# Patient Record
Sex: MALE | Race: WHITE
[De-identification: names, ages, dates, MRNs, and addresses within clinical notes are randomized per-mention and may not be internally consistent; named-entity substitution may affect disease eponyms.]

---

## 2017-11-16 ENCOUNTER — APPOINTMENT (OUTPATIENT)
Dept: DERMATOLOGY | Facility: CLINIC | Age: 82
End: 2017-11-16
Payer: MEDICARE

## 2017-11-16 VITALS — HEIGHT: 69 IN | BODY MASS INDEX: 27.25 KG/M2 | WEIGHT: 184 LBS

## 2017-11-16 DIAGNOSIS — L30.8 OTHER SPECIFIED DERMATITIS: ICD-10-CM

## 2017-11-16 PROCEDURE — 99213 OFFICE O/P EST LOW 20 MIN: CPT

## 2018-06-14 ENCOUNTER — OUTPATIENT (OUTPATIENT)
Dept: OUTPATIENT SERVICES | Facility: HOSPITAL | Age: 83
LOS: 1 days | End: 2018-06-14
Payer: MEDICARE

## 2018-06-14 VITALS
HEIGHT: 67 IN | TEMPERATURE: 98 F | SYSTOLIC BLOOD PRESSURE: 144 MMHG | RESPIRATION RATE: 14 BRPM | DIASTOLIC BLOOD PRESSURE: 53 MMHG | HEART RATE: 56 BPM | WEIGHT: 184.31 LBS

## 2018-06-14 DIAGNOSIS — R35.1 NOCTURIA: ICD-10-CM

## 2018-06-14 DIAGNOSIS — E78.5 HYPERLIPIDEMIA, UNSPECIFIED: ICD-10-CM

## 2018-06-14 DIAGNOSIS — N40.1 BENIGN PROSTATIC HYPERPLASIA WITH LOWER URINARY TRACT SYMPTOMS: ICD-10-CM

## 2018-06-14 DIAGNOSIS — R41.3 OTHER AMNESIA: ICD-10-CM

## 2018-06-14 DIAGNOSIS — I10 ESSENTIAL (PRIMARY) HYPERTENSION: ICD-10-CM

## 2018-06-14 DIAGNOSIS — Z96.651 PRESENCE OF RIGHT ARTIFICIAL KNEE JOINT: Chronic | ICD-10-CM

## 2018-06-14 DIAGNOSIS — F41.9 ANXIETY DISORDER, UNSPECIFIED: ICD-10-CM

## 2018-06-14 DIAGNOSIS — Z90.89 ACQUIRED ABSENCE OF OTHER ORGANS: Chronic | ICD-10-CM

## 2018-06-14 DIAGNOSIS — Z01.818 ENCOUNTER FOR OTHER PREPROCEDURAL EXAMINATION: ICD-10-CM

## 2018-06-14 LAB
ANION GAP SERPL CALC-SCNC: 7 MMOL/L — SIGNIFICANT CHANGE UP (ref 5–17)
APPEARANCE UR: CLEAR — SIGNIFICANT CHANGE UP
BILIRUB UR-MCNC: NEGATIVE — SIGNIFICANT CHANGE UP
BUN SERPL-MCNC: 21 MG/DL — SIGNIFICANT CHANGE UP (ref 7–23)
CALCIUM SERPL-MCNC: 9.2 MG/DL — SIGNIFICANT CHANGE UP (ref 8.5–10.1)
CHLORIDE SERPL-SCNC: 109 MMOL/L — HIGH (ref 96–108)
CO2 SERPL-SCNC: 28 MMOL/L — SIGNIFICANT CHANGE UP (ref 22–31)
COLOR SPEC: SIGNIFICANT CHANGE UP
CREAT SERPL-MCNC: 1.1 MG/DL — SIGNIFICANT CHANGE UP (ref 0.5–1.3)
DIFF PNL FLD: NEGATIVE — SIGNIFICANT CHANGE UP
GLUCOSE SERPL-MCNC: 96 MG/DL — SIGNIFICANT CHANGE UP (ref 70–99)
GLUCOSE UR QL: NEGATIVE — SIGNIFICANT CHANGE UP
HCT VFR BLD CALC: 33.4 % — LOW (ref 39–50)
HGB BLD-MCNC: 11 G/DL — LOW (ref 13–17)
KETONES UR-MCNC: NEGATIVE — SIGNIFICANT CHANGE UP
LEUKOCYTE ESTERASE UR-ACNC: NEGATIVE — SIGNIFICANT CHANGE UP
MCHC RBC-ENTMCNC: 30.3 PG — SIGNIFICANT CHANGE UP (ref 27–34)
MCHC RBC-ENTMCNC: 32.9 GM/DL — SIGNIFICANT CHANGE UP (ref 32–36)
MCV RBC AUTO: 92 FL — SIGNIFICANT CHANGE UP (ref 80–100)
NITRITE UR-MCNC: NEGATIVE — SIGNIFICANT CHANGE UP
NRBC # BLD: 0 /100 WBCS — SIGNIFICANT CHANGE UP (ref 0–0)
PH UR: 5 — SIGNIFICANT CHANGE UP (ref 5–8)
PLATELET # BLD AUTO: 168 K/UL — SIGNIFICANT CHANGE UP (ref 150–400)
POTASSIUM SERPL-MCNC: 4.4 MMOL/L — SIGNIFICANT CHANGE UP (ref 3.5–5.3)
POTASSIUM SERPL-SCNC: 4.4 MMOL/L — SIGNIFICANT CHANGE UP (ref 3.5–5.3)
PROT UR-MCNC: NEGATIVE — SIGNIFICANT CHANGE UP
RBC # BLD: 3.63 M/UL — LOW (ref 4.2–5.8)
RBC # FLD: 15.1 % — HIGH (ref 10.3–14.5)
SODIUM SERPL-SCNC: 144 MMOL/L — SIGNIFICANT CHANGE UP (ref 135–145)
SP GR SPEC: 1.01 — SIGNIFICANT CHANGE UP (ref 1.01–1.02)
UROBILINOGEN FLD QL: NEGATIVE — SIGNIFICANT CHANGE UP
WBC # BLD: 4.99 K/UL — SIGNIFICANT CHANGE UP (ref 3.8–10.5)
WBC # FLD AUTO: 4.99 K/UL — SIGNIFICANT CHANGE UP (ref 3.8–10.5)

## 2018-06-14 PROCEDURE — 93010 ELECTROCARDIOGRAM REPORT: CPT | Mod: NC

## 2018-06-14 PROCEDURE — 85027 COMPLETE CBC AUTOMATED: CPT

## 2018-06-14 PROCEDURE — 93005 ELECTROCARDIOGRAM TRACING: CPT

## 2018-06-14 PROCEDURE — 81003 URINALYSIS AUTO W/O SCOPE: CPT

## 2018-06-14 PROCEDURE — 87086 URINE CULTURE/COLONY COUNT: CPT

## 2018-06-14 PROCEDURE — 80048 BASIC METABOLIC PNL TOTAL CA: CPT

## 2018-06-14 PROCEDURE — G0463: CPT

## 2018-06-14 NOTE — H&P PST ADULT - NEGATIVE GENERAL GENITOURINARY SYMPTOMS
no dysuria/no flank pain R/no flank pain L/no hematuria/no renal colic/no incontinence/no bladder infections

## 2018-06-14 NOTE — H&P PST ADULT - HISTORY OF PRESENT ILLNESS
87yo male with medical h/o HTN, HDL, Anxiety, Memory problems, and Memory problems. Pt reports urinary symptoms of urgency and frequency x 2-3 months, with occasional pain/discomfort. Pt presents today for presurgical testing for Greenlight Laser Prostate scheduled 6/26/2016.

## 2018-06-14 NOTE — H&P PST ADULT - NEGATIVE NEUROLOGICAL SYMPTOMS
no tremors/no vertigo/no loss of sensation/no syncope/no loss of consciousness/no paresthesias/no headache/no weakness/no generalized seizures/no focal seizures/no transient paralysis

## 2018-06-14 NOTE — H&P PST ADULT - NEGATIVE CARDIOVASCULAR SYMPTOMS
no peripheral edema/no claudication/no palpitations/no paroxysmal nocturnal dyspnea/no orthopnea/no dyspnea on exertion/no chest pain

## 2018-06-14 NOTE — H&P PST ADULT - PROBLEM SELECTOR PLAN 1
Greenlight Laser Prostate scheduled 6/26/2016.  Pre-op instructions given. Pt verbalized understanding  Chlorhexidine wash instructions given  Pending: CMC, BMP, UA, Urine C&S & Medical clearance (Dr. Vasquez PCP) Greenlight Laser Prostate scheduled 6/26/2016.  Pre-op instructions given. Pt verbalized understanding  Chlorhexidine wash instructions given  Pending:   CMC, BMP, UA, Urine C&S   Medical clearance (Dr. Vasquez PCP)

## 2018-06-14 NOTE — H&P PST ADULT - NEGATIVE MUSCULOSKELETAL SYMPTOMS
no arthralgia/no joint swelling/no myalgia/no stiffness/no arm pain L/no arm pain R/no muscle weakness/no muscle cramps

## 2018-06-14 NOTE — H&P PST ADULT - FALLEN IN THE PAST
tripped and fell few days ago at home, with abrasion to right elbow - didn't require hospitalization/yes

## 2018-06-14 NOTE — H&P PST ADULT - NSANTHOSAYNRD_GEN_A_CORE
No. BERNIE screening performed.  STOP BANG Legend: 0-2 = LOW Risk; 3-4 = INTERMEDIATE Risk; 5-8 = HIGH Risk

## 2018-06-15 LAB
CULTURE RESULTS: NO GROWTH — SIGNIFICANT CHANGE UP
SPECIMEN SOURCE: SIGNIFICANT CHANGE UP

## 2018-06-25 ENCOUNTER — TRANSCRIPTION ENCOUNTER (OUTPATIENT)
Age: 83
End: 2018-06-25

## 2018-06-26 ENCOUNTER — OUTPATIENT (OUTPATIENT)
Dept: OUTPATIENT SERVICES | Facility: HOSPITAL | Age: 83
LOS: 1 days | End: 2018-06-26
Payer: MEDICARE

## 2018-06-26 VITALS
HEART RATE: 44 BPM | TEMPERATURE: 98 F | OXYGEN SATURATION: 97 % | SYSTOLIC BLOOD PRESSURE: 141 MMHG | DIASTOLIC BLOOD PRESSURE: 66 MMHG | HEIGHT: 68 IN | WEIGHT: 184.31 LBS | RESPIRATION RATE: 15 BRPM

## 2018-06-26 VITALS
RESPIRATION RATE: 10 BRPM | SYSTOLIC BLOOD PRESSURE: 155 MMHG | OXYGEN SATURATION: 99 % | HEART RATE: 66 BPM | DIASTOLIC BLOOD PRESSURE: 70 MMHG

## 2018-06-26 DIAGNOSIS — N40.1 BENIGN PROSTATIC HYPERPLASIA WITH LOWER URINARY TRACT SYMPTOMS: ICD-10-CM

## 2018-06-26 DIAGNOSIS — Z01.818 ENCOUNTER FOR OTHER PREPROCEDURAL EXAMINATION: ICD-10-CM

## 2018-06-26 DIAGNOSIS — Z96.651 PRESENCE OF RIGHT ARTIFICIAL KNEE JOINT: Chronic | ICD-10-CM

## 2018-06-26 DIAGNOSIS — R35.1 NOCTURIA: ICD-10-CM

## 2018-06-26 DIAGNOSIS — Z90.89 ACQUIRED ABSENCE OF OTHER ORGANS: Chronic | ICD-10-CM

## 2018-06-26 PROCEDURE — C1889: CPT

## 2018-06-26 PROCEDURE — 52648 LASER SURGERY OF PROSTATE: CPT

## 2018-06-26 RX ORDER — ONDANSETRON 8 MG/1
4 TABLET, FILM COATED ORAL ONCE
Qty: 0 | Refills: 0 | Status: DISCONTINUED | OUTPATIENT
Start: 2018-06-26 | End: 2018-06-26

## 2018-06-26 RX ORDER — CEFTRIAXONE 500 MG/1
1 INJECTION, POWDER, FOR SOLUTION INTRAMUSCULAR; INTRAVENOUS ONCE
Qty: 0 | Refills: 0 | Status: COMPLETED | OUTPATIENT
Start: 2018-06-26 | End: 2018-06-26

## 2018-06-26 RX ORDER — PHENAZOPYRIDINE HCL 100 MG
1 TABLET ORAL
Qty: 21 | Refills: 1
Start: 2018-06-26 | End: 2018-07-09

## 2018-06-26 RX ORDER — SODIUM CHLORIDE 9 MG/ML
1000 INJECTION, SOLUTION INTRAVENOUS
Qty: 0 | Refills: 0 | Status: DISCONTINUED | OUTPATIENT
Start: 2018-06-26 | End: 2018-06-26

## 2018-06-26 RX ORDER — ACETAMINOPHEN 500 MG
1000 TABLET ORAL ONCE
Qty: 0 | Refills: 0 | Status: COMPLETED | OUTPATIENT
Start: 2018-06-26 | End: 2018-06-26

## 2018-06-26 RX ORDER — HYDROMORPHONE HYDROCHLORIDE 2 MG/ML
0.3 INJECTION INTRAMUSCULAR; INTRAVENOUS; SUBCUTANEOUS ONCE
Qty: 0 | Refills: 0 | Status: DISCONTINUED | OUTPATIENT
Start: 2018-06-26 | End: 2018-06-26

## 2018-06-26 RX ORDER — CEFUROXIME AXETIL 250 MG
1 TABLET ORAL
Qty: 10 | Refills: 0
Start: 2018-06-26 | End: 2018-06-30

## 2018-06-26 RX ADMIN — SODIUM CHLORIDE 75 MILLILITER(S): 9 INJECTION, SOLUTION INTRAVENOUS at 11:05

## 2018-06-26 RX ADMIN — Medication 400 MILLIGRAM(S): at 12:59

## 2018-06-26 RX ADMIN — SODIUM CHLORIDE 75 MILLILITER(S): 9 INJECTION, SOLUTION INTRAVENOUS at 12:59

## 2018-06-26 NOTE — ASU DISCHARGE PLAN (ADULT/PEDIATRIC). - MEDICATION SUMMARY - MEDICATIONS TO TAKE
I will START or STAY ON the medications listed below when I get home from the hospital:    metoprolol  -- 25 milligram(s) by mouth once a day  -- Indication: For as prior to admission    tamsulosin 0.4 mg oral capsule  -- 1 cap(s) by mouth once a day  -- Indication: For as prior to admission    sertraline 25 mg oral tablet  -- 1 tab(s) by mouth once a day  -- Indication: For as prior to admission    cefuroxime 500 mg oral tablet  -- 1 tab(s) by mouth every 12 hours   -- Finish all this medication unless otherwise directed by prescriber.  Medication should be taken with plenty of water.  Take with food or milk.    -- Indication: For antibiotic    donepezil 10 mg oral tablet  -- 1 tab(s) by mouth once a day (at bedtime)  -- Indication: For as prior to admission    Ginkgo Biloba oral tablet  -- 1  orally. last dose 6/18/2018  -- Indication: For as prior to admission    memantine 5 mg oral tablet  -- orally once a day  -- Indication: For as prior to admission    Pyridium 200 mg oral tablet  -- 1 tab(s) by mouth 3 times a day (after meals) as needed for urinary discomfort  -- May discolor urine or feces.  Medication should be taken with plenty of water.  Take with food or milk.    -- Indication: For as needed for urinary discomfort    Lutein  -- 1  orally. last dose 6/18/2018  -- Indication: For as prior to admission    multivitamin  -- 1  orally. last dose 6/18/2018  -- Indication: For as prior to admission    Vitamin D3  -- 1  orally  -- Indication: For as prior to admission

## 2018-06-26 NOTE — ASU PATIENT PROFILE, ADULT - FALLEN IN THE PAST
yes/tripped and fell few days ago at home, with abrasion to right elbow - didn't require hospitalization

## 2018-06-26 NOTE — BRIEF OPERATIVE NOTE - PROCEDURE
<<-----Click on this checkbox to enter Procedure Cystoscopy with photoselective vaporization of prostate (PVP) with 532 nm laser  06/26/2018    Active  MARIZ

## 2019-09-20 ENCOUNTER — RX RENEWAL (OUTPATIENT)
Age: 84
End: 2019-09-20

## 2020-08-04 PROBLEM — I10 ESSENTIAL (PRIMARY) HYPERTENSION: Chronic | Status: ACTIVE | Noted: 2018-06-14

## 2020-08-04 PROBLEM — M19.90 UNSPECIFIED OSTEOARTHRITIS, UNSPECIFIED SITE: Chronic | Status: ACTIVE | Noted: 2018-06-14

## 2020-08-04 PROBLEM — E78.5 HYPERLIPIDEMIA, UNSPECIFIED: Chronic | Status: ACTIVE | Noted: 2018-06-14

## 2020-08-04 PROBLEM — F41.9 ANXIETY DISORDER, UNSPECIFIED: Chronic | Status: ACTIVE | Noted: 2018-06-14

## 2020-08-21 ENCOUNTER — APPOINTMENT (OUTPATIENT)
Dept: INTERNAL MEDICINE | Facility: CLINIC | Age: 85
End: 2020-08-21
Payer: MEDICARE

## 2020-08-21 VITALS
SYSTOLIC BLOOD PRESSURE: 130 MMHG | TEMPERATURE: 97.2 F | RESPIRATION RATE: 18 BRPM | OXYGEN SATURATION: 97 % | HEIGHT: 70 IN | DIASTOLIC BLOOD PRESSURE: 62 MMHG | WEIGHT: 172 LBS | HEART RATE: 59 BPM | BODY MASS INDEX: 24.62 KG/M2

## 2020-08-21 PROCEDURE — 99204 OFFICE O/P NEW MOD 45 MIN: CPT

## 2020-08-21 RX ORDER — APIXABAN 5 MG/1
TABLET, FILM COATED ORAL
Refills: 0 | Status: DISCONTINUED | COMMUNITY
End: 2020-08-21

## 2020-08-21 NOTE — PHYSICAL EXAM
[Well Developed] : well developed [No Acute Distress] : no acute distress [Well Nourished] : well nourished [Normal Sclera/Conjunctiva] : normal sclera/conjunctiva [Normal Oropharynx] : the oropharynx was normal [Normal Outer Ear/Nose] : the outer ears and nose were normal in appearance [No JVD] : no jugular venous distention [No Lymphadenopathy] : no lymphadenopathy [Supple] : supple [No Respiratory Distress] : no respiratory distress  [No Accessory Muscle Use] : no accessory muscle use [Clear to Auscultation] : lungs were clear to auscultation bilaterally [Normal Rate] : normal rate  [Normal S1, S2] : normal S1 and S2 [Soft] : abdomen soft [No Extremity Clubbing/Cyanosis] : no extremity clubbing/cyanosis [Non Tender] : non-tender [Non-distended] : non-distended [No HSM] : no HSM [Normal Bowel Sounds] : normal bowel sounds [Normal Anterior Cervical Nodes] : no anterior cervical lymphadenopathy [No Rash] : no rash [Normal Gait] : normal gait [de-identified] : patch over R eye area [de-identified] : 1 plus LE edema

## 2020-08-21 NOTE — HISTORY OF PRESENT ILLNESS
[de-identified] : This is the first visit here for this 89-year-old male who is accompanied by his wife today.  He has a history of dementia, hypertension, depression, and BPH.  He is also followed by Dr. Sun, cardiology, in [place].\par \par He and his wife recently moved to [facility] nearby and he is changing to us for his primary medical doctor.  We will have the important part of the medical records from his previous PCP forwarded here for our records.\par \par He and his wife are refusing to have the pneumococcal 23 and Tdap vaccinations.  He will receive the flu vaccination in the fall. [FreeTextEntry1] : 1st visit.  To establish care.

## 2020-09-17 ENCOUNTER — APPOINTMENT (OUTPATIENT)
Dept: INTERNAL MEDICINE | Facility: CLINIC | Age: 85
End: 2020-09-17
Payer: MEDICARE

## 2020-09-17 VITALS
TEMPERATURE: 97.9 F | HEART RATE: 50 BPM | WEIGHT: 174.38 LBS | SYSTOLIC BLOOD PRESSURE: 150 MMHG | RESPIRATION RATE: 14 BRPM | DIASTOLIC BLOOD PRESSURE: 70 MMHG | OXYGEN SATURATION: 98 % | BODY MASS INDEX: 25.83 KG/M2 | HEIGHT: 69 IN

## 2020-09-17 VITALS — SYSTOLIC BLOOD PRESSURE: 130 MMHG | DIASTOLIC BLOOD PRESSURE: 70 MMHG

## 2020-09-17 PROCEDURE — 99214 OFFICE O/P EST MOD 30 MIN: CPT | Mod: 25

## 2020-09-17 PROCEDURE — G0442 ANNUAL ALCOHOL SCREEN 15 MIN: CPT | Mod: 59

## 2020-09-17 PROCEDURE — 90662 IIV NO PRSV INCREASED AG IM: CPT

## 2020-09-17 PROCEDURE — G0008: CPT

## 2020-09-17 RX ORDER — TAMSULOSIN HYDROCHLORIDE 0.4 MG/1
CAPSULE ORAL DAILY
Refills: 0 | Status: COMPLETED | COMMUNITY
End: 2020-08-17

## 2020-09-17 RX ORDER — FLUTICASONE PROPIONATE 0.5 MG/G
0.05 CREAM TOPICAL TWICE DAILY
Qty: 1 | Refills: 2 | Status: COMPLETED | COMMUNITY
Start: 2017-11-16 | End: 2018-09-17

## 2020-09-17 NOTE — PLAN
[FreeTextEntry1] : Cardiology\par history of hypertension - continue Furosemide 20mg p.o.q.d. as directed - continue low sodium diet - continue to follow up with cardiologist, Dr. Bean\par history of hypercholesterolemia - continue low cholesterol/low fat diet \par dizziness - likely secondary to orthostatic hypotension; possibly secondary to being on Furosemide - advised changing positions slowly - advised staying well-hydrated \par Endocrinology\par continue Vitamin D-3 1000 IU p.o.q.d. with meals as directed \par Neurology\par dementia - continue Donepezil HCl 10mg p.o.q.d. and Memantine HCl 10mg BID p.o.q.d. as directed \par Psychiatry\par depression - continue Sertraline HCl 25mg p.o. as directed \par Immunization \par flu vaccine - Fluzone Quadrivalent High-Dose 0.7mL x 1 administered intramuscularly

## 2020-09-17 NOTE — ADDENDUM
[FreeTextEntry1] : I, Leonel East, acted solely as scribe for Dr. Tashi Akbar DO on this date 09/17/2020 12:20PM .\par \par All medical record entries made by the Scribe were at my, Dr. Tashi Akbar DO direction and personally dictated by me on 09/17/2020 12:20PM. I have reviewed the chart and agree that the record accurately reflects my personal performance of the history, physical exam, assessment and plan. I have also personally directed, reviewed and agreed with the chart.\par

## 2020-09-17 NOTE — REVIEW OF SYSTEMS
[Negative] : Heme/Lymph [Vision Problems] : vision problems [Dizziness] : dizziness [FreeTextEntry3] : lost vision in right eye

## 2020-09-17 NOTE — HEALTH RISK ASSESSMENT
[Yes] : Yes [Monthly or less (1 pt)] : Monthly or less (1 point) [1 or 2 (0 pts)] : 1 or 2 (0 points) [Never (0 pts)] : Never (0 points) [No] : In the past 12 months have you used drugs other than those required for medical reasons? No [0] : 1) Little interest or pleasure doing things: Not at all (0) [1] : 2) Feeling down, depressed, or hopeless for several days (1) [] : No [Audit-CScore] : 1 [ICK6Libct] : 1
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
## 2020-09-17 NOTE — ASSESSMENT
[FreeTextEntry1] : New patient is a 89 year old male with a past medical history as above who presents to establish care.\par

## 2020-10-14 ENCOUNTER — APPOINTMENT (OUTPATIENT)
Dept: INTERNAL MEDICINE | Facility: CLINIC | Age: 85
End: 2020-10-14

## 2021-04-14 ENCOUNTER — NON-APPOINTMENT (OUTPATIENT)
Age: 86
End: 2021-04-14

## 2021-09-24 ENCOUNTER — APPOINTMENT (OUTPATIENT)
Dept: INTERNAL MEDICINE | Facility: CLINIC | Age: 86
End: 2021-09-24
Payer: MEDICARE

## 2021-09-24 VITALS — SYSTOLIC BLOOD PRESSURE: 130 MMHG | HEART RATE: 60 BPM | DIASTOLIC BLOOD PRESSURE: 80 MMHG | TEMPERATURE: 98.2 F

## 2021-09-24 DIAGNOSIS — R42 DIZZINESS AND GIDDINESS: ICD-10-CM

## 2021-09-24 DIAGNOSIS — Z86.79 PERSONAL HISTORY OF OTHER DISEASES OF THE CIRCULATORY SYSTEM: ICD-10-CM

## 2021-09-24 DIAGNOSIS — N40.0 BENIGN PROSTATIC HYPERPLASIA WITHOUT LOWER URINARY TRACT SYMPMS: ICD-10-CM

## 2021-09-24 DIAGNOSIS — Z86.39 PERSONAL HISTORY OF OTHER ENDOCRINE, NUTRITIONAL AND METABOLIC DISEASE: ICD-10-CM

## 2021-09-24 DIAGNOSIS — Z79.899 OTHER LONG TERM (CURRENT) DRUG THERAPY: ICD-10-CM

## 2021-09-24 PROCEDURE — 99214 OFFICE O/P EST MOD 30 MIN: CPT | Mod: 25

## 2021-09-24 PROCEDURE — 36415 COLL VENOUS BLD VENIPUNCTURE: CPT

## 2021-09-25 PROBLEM — R42 ORTHOSTATIC DIZZINESS: Status: ACTIVE | Noted: 2020-09-17

## 2021-09-25 PROBLEM — Z79.899 MEDICATION MANAGEMENT: Status: ACTIVE | Noted: 2021-09-24

## 2021-09-25 PROBLEM — N40.0 BPH (BENIGN PROSTATIC HYPERPLASIA): Status: ACTIVE | Noted: 2020-08-21

## 2021-09-25 NOTE — ADDENDUM
[FreeTextEntry1] : I, Leonel East, acted solely as scribe for Dr. Tashi Akbar DO on this date 09/24/2021 12:10PM .\par \par All medical record entries made by the Scribe were at my, Dr. Tashi Akbar DO direction and personally dictated by me on 09/24/2021 12:10PM. I have reviewed the chart and agree that the record accurately reflects my personal performance of the history, physical exam, assessment and plan. I have also personally directed, reviewed and agreed with the chart.\par

## 2021-09-25 NOTE — ASSESSMENT
[FreeTextEntry1] : Patient is a 89 year old male with a past medical history as above who presents for fasting blood work and general follow-up.\par

## 2021-09-25 NOTE — HEALTH RISK ASSESSMENT
[Yes] : Yes [Monthly or less (1 pt)] : Monthly or less (1 point) [1 or 2 (0 pts)] : 1 or 2 (0 points) [Never (0 pts)] : Never (0 points) [No] : In the past 12 months have you used drugs other than those required for medical reasons? No [0] : 1) Little interest or pleasure doing things: Not at all (0) [1] : 2) Feeling down, depressed, or hopeless for several days (1) [PHQ-2 Negative - No further assessment needed] : PHQ-2 Negative - No further assessment needed [] : No [Audit-CScore] : 1 [[id number]] : 1

## 2021-09-25 NOTE — HISTORY OF PRESENT ILLNESS
[Spouse] : spouse [Family Member] : family member [FreeTextEntry1] : fasting blood work and general follow-up\par  [de-identified] : Patient is a 89 year old male with a past medical history as below who presents for fasting blood work and general follow-up. Patient is taking all medications as prescribed and denies any adverse reactions or side effects. He denies lightheadedness or dizziness on Furosemide. Patient is currently taking Donepezil and Memantine for dementia. He continues to follow up with neurology. Patient states he feels well overall with no new complaints.

## 2021-09-25 NOTE — PLAN
[FreeTextEntry1] : Cardiology\par history of hypertension - continue Furosemide 20mg p.o.q.d. as directed - check CMP - continue low sodium diet; will continue to monitor BP \par history of hypercholesterolemia - continue low cholesterol/low fat diet - check FLP\par Continue to follow up with cardiologist, Dr. Bean\par Endocrinology\par continue Vitamin D-3 1000 IU p.o.q.d. with meals as directed - check Vitamin D \par Neurology\par dementia - continue Donepezil HCl 10mg p.o.q.d. as directed and Memantine HCl 10mg BID p.o.q.d. as directed - continue to follow up with neurology \par Psychiatry\par depression - continue Sertraline HCl 25mg p.o. as directed - check Serum Vitamin B12\par Urology\par BPH - check PSA\par \par check male panel, hemoglobin A1C, and Serum Vitamin B12

## 2021-09-29 LAB
25(OH)D3 SERPL-MCNC: 46.1 NG/ML
ALBUMIN SERPL ELPH-MCNC: 4.6 G/DL
ALP BLD-CCNC: 71 U/L
ALT SERPL-CCNC: 15 U/L
ANION GAP SERPL CALC-SCNC: 12 MMOL/L
AST SERPL-CCNC: 19 U/L
BASOPHILS # BLD AUTO: 0.02 K/UL
BASOPHILS NFR BLD AUTO: 0.4 %
BILIRUB SERPL-MCNC: 1 MG/DL
BUN SERPL-MCNC: 23 MG/DL
CALCIUM SERPL-MCNC: 9.6 MG/DL
CHLORIDE SERPL-SCNC: 104 MMOL/L
CHOLEST SERPL-MCNC: 173 MG/DL
CO2 SERPL-SCNC: 27 MMOL/L
CREAT SERPL-MCNC: 1.21 MG/DL
EOSINOPHIL # BLD AUTO: 0.06 K/UL
EOSINOPHIL NFR BLD AUTO: 1.1 %
ESTIMATED AVERAGE GLUCOSE: 108 MG/DL
GLUCOSE SERPL-MCNC: 94 MG/DL
HBA1C MFR BLD HPLC: 5.4 %
HCT VFR BLD CALC: 42.5 %
HDLC SERPL-MCNC: 46 MG/DL
HGB BLD-MCNC: 13.8 G/DL
IMM GRANULOCYTES NFR BLD AUTO: 0.4 %
LDLC SERPL CALC-MCNC: 94 MG/DL
LYMPHOCYTES # BLD AUTO: 1.25 K/UL
LYMPHOCYTES NFR BLD AUTO: 22.6 %
MAN DIFF?: NORMAL
MCHC RBC-ENTMCNC: 32.5 GM/DL
MCHC RBC-ENTMCNC: 33.2 PG
MCV RBC AUTO: 102.2 FL
MONOCYTES # BLD AUTO: 0.69 K/UL
MONOCYTES NFR BLD AUTO: 12.5 %
NEUTROPHILS # BLD AUTO: 3.48 K/UL
NEUTROPHILS NFR BLD AUTO: 63 %
NONHDLC SERPL-MCNC: 127 MG/DL
PLATELET # BLD AUTO: 147 K/UL
POTASSIUM SERPL-SCNC: 4.4 MMOL/L
PROT SERPL-MCNC: 7.3 G/DL
PSA SERPL-MCNC: 0.87 NG/ML
RBC # BLD: 4.16 M/UL
RBC # FLD: 13 %
SODIUM SERPL-SCNC: 143 MMOL/L
TRIGL SERPL-MCNC: 166 MG/DL
TSH SERPL-ACNC: 1.45 UIU/ML
VIT B12 SERPL-MCNC: >2000 PG/ML
WBC # FLD AUTO: 5.52 K/UL

## 2021-11-02 ENCOUNTER — EMERGENCY (EMERGENCY)
Facility: HOSPITAL | Age: 86
LOS: 0 days | Discharge: HOME | End: 2021-11-02
Attending: STUDENT IN AN ORGANIZED HEALTH CARE EDUCATION/TRAINING PROGRAM
Payer: MEDICARE

## 2021-11-02 VITALS
WEIGHT: 179.9 LBS | HEIGHT: 68 IN | HEART RATE: 80 BPM | DIASTOLIC BLOOD PRESSURE: 77 MMHG | SYSTOLIC BLOOD PRESSURE: 172 MMHG | TEMPERATURE: 98 F | RESPIRATION RATE: 18 BRPM | OXYGEN SATURATION: 100 %

## 2021-11-02 VITALS
TEMPERATURE: 98 F | HEART RATE: 77 BPM | DIASTOLIC BLOOD PRESSURE: 67 MMHG | RESPIRATION RATE: 16 BRPM | OXYGEN SATURATION: 98 % | SYSTOLIC BLOOD PRESSURE: 148 MMHG

## 2021-11-02 DIAGNOSIS — Z90.89 ACQUIRED ABSENCE OF OTHER ORGANS: Chronic | ICD-10-CM

## 2021-11-02 DIAGNOSIS — Z91.018 ALLERGY TO OTHER FOODS: ICD-10-CM
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
## 2021-11-02 DIAGNOSIS — M25.511 PAIN IN RIGHT SHOULDER: ICD-10-CM
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
## 2021-11-02 DIAGNOSIS — I10 ESSENTIAL (PRIMARY) HYPERTENSION: ICD-10-CM

## 2021-11-02 DIAGNOSIS — E78.5 HYPERLIPIDEMIA, UNSPECIFIED: ICD-10-CM

## 2021-11-02 DIAGNOSIS — Z96.651 PRESENCE OF RIGHT ARTIFICIAL KNEE JOINT: Chronic | ICD-10-CM

## 2021-11-02 PROCEDURE — 72125 CT NECK SPINE W/O DYE: CPT | Mod: MG

## 2021-11-02 PROCEDURE — G1004: CPT

## 2021-11-02 PROCEDURE — 99284 EMERGENCY DEPT VISIT MOD MDM: CPT

## 2021-11-02 PROCEDURE — 70450 CT HEAD/BRAIN W/O DYE: CPT | Mod: MG

## 2021-11-02 PROCEDURE — 73030 X-RAY EXAM OF SHOULDER: CPT | Mod: RT

## 2021-11-02 PROCEDURE — 73060 X-RAY EXAM OF HUMERUS: CPT | Mod: RT

## 2021-11-02 PROCEDURE — 70450 CT HEAD/BRAIN W/O DYE: CPT | Mod: 26,MG

## 2021-11-02 PROCEDURE — 73030 X-RAY EXAM OF SHOULDER: CPT | Mod: 26,RT

## 2021-11-02 PROCEDURE — 73060 X-RAY EXAM OF HUMERUS: CPT | Mod: 26,RT

## 2021-11-02 PROCEDURE — 72125 CT NECK SPINE W/O DYE: CPT | Mod: 26,MG

## 2021-11-02 PROCEDURE — 99284 EMERGENCY DEPT VISIT MOD MDM: CPT | Mod: 25

## 2021-11-02 RX ORDER — ACETAMINOPHEN 500 MG
650 TABLET ORAL ONCE
Refills: 0 | Status: COMPLETED | OUTPATIENT
Start: 2021-11-02 | End: 2021-11-02

## 2021-11-02 RX ADMIN — Medication 650 MILLIGRAM(S): at 11:40

## 2021-11-02 NOTE — ED PROVIDER NOTE - NSFOLLOWUPINSTRUCTIONS_ED_ALL_ED_FT
Fall Prevention    WHAT YOU NEED TO KNOW:    Fall prevention includes ways to make your home and other areas safer. It also includes ways you can move more carefully to prevent a fall. Health conditions that cause changes in your blood pressure, vision, or muscle strength and coordination may increase your risk for falls. Medicines may also increase your risk for falls if they make you dizzy, weak, or sleepy.     DISCHARGE INSTRUCTIONS:    Call 911 or have someone else call if:     You have fallen and are unconscious.      You have fallen and cannot move part of your body.    Contact your healthcare provider if:     You have fallen and have pain or a headache.      You have questions or concerns about your condition or care.    Fall prevention tips:     Stand or sit up slowly. This may help you keep your balance and prevent falls.      Use assistive devices as directed. Your healthcare provider may suggest that you use a cane or walker to help you keep your balance. You may need to have grab bars put in your bathroom near the toilet or in the shower.      Wear shoes that fit well and have soles that . Wear shoes both inside and outside. Use slippers with good . Do not wear shoes with high heels.      Wear a personal alarm. This is a device that allows you to call 911 if you fall and need help. Ask your healthcare provider for more information.      Stay active. Exercise can help strengthen your muscles and improve your balance. Your healthcare provider may recommend water aerobics or walking. He or she may also recommend physical therapy to improve your coordination. Never start an exercise program without talking to your healthcare provider first.       Manage your medical conditions. Keep all appointments with your healthcare providers. Visit your eye doctor as directed.           Home safety tips:     Add items to prevent falls in the bathroom. Put nonslip strips on your bath or shower floor to prevent you from slipping. Use a bath mat if you do not have carpet in the bathroom. This will prevent you from falling when you step out of the bath or shower. Use a shower seat so you do not need to stand while you shower. Sit on the toilet or a chair in your bathroom to dry yourself and put on clothing. This will prevent you from losing your balance from drying or dressing yourself while you are standing.       Keep paths clear. Remove books, shoes, and other objects from walkways and stairs. Place cords for telephones and lamps out of the way so that you do not need to walk over them. Tape them down if you cannot move them. Remove small rugs. If you cannot remove a rug, secure it with double-sided tape. This will prevent you from tripping.       Install bright lights in your home. Use night lights to help light paths to the bathroom or kitchen. Always turn on the light before you start walking.      Keep items you use often on shelves within reach. Do not use a step stool to help you reach an item.      Paint or place reflective tape on the edges of your stairs. This will help you see the stairs better.    Follow up with your healthcare provider as directed: Write down your questions so you remember to ask them during your visits.    Shoulder Pain  Many things can cause shoulder pain, including:  An injury to the shoulder.Overuse of the shoulder.Arthritis.The source of the pain can be:  Inflammation.An injury to the shoulder joint.An injury to a tendon, ligament, or bone.Follow these instructions at home:  Pay attention to changes in your symptoms. Let your health care provider know about them. Follow these instructions to relieve your pain.  If you have a sling:     Wear the sling as told by your health care provider. Remove it only as told by your health care provider. Loosen the sling if your fingers tingle, become numb, or turn cold and blue.Keep the sling clean.If the sling is not waterproof:  Do not let it get wet. Remove it to shower or bathe. Move your arm as little as possible, but keep your hand moving to prevent swelling.Managing pain, stiffness, and swelling        If directed, put ice on the painful area:  Put ice in a plastic bag.Place a towel between your skin and the bag.Leave the ice on for 20 minutes, 2–3 times per day. Stop applying ice if it does not help with the pain.Squeeze a soft ball or a foam pad as much as possible. This helps to keep the shoulder from swelling. It also helps to strengthen the arm.General instructions     Take over-the-counter and prescription medicines only as told by your health care provider.Keep all follow-up visits as told by your health care provider. This is important.Contact a health care provider if:  Your pain gets worse.Your pain is not relieved with medicines.New pain develops in your arm, hand, or fingers.Get help right away if:  Your arm, hand, or fingers:  Tingle.Become numb.Become swollen.Become painful.Turn white or blue.Summary  Shoulder pain can be caused by an injury, overuse, or arthritis.Pay attention to changes in your symptoms. Let your health care provider know about them.This condition may be treated with a sling, ice, and pain medicines.Contact your health care provider if the pain gets worse or new pain develops. Get help right away if your arm, hand, or fingers tingle or become numb, swollen, or painful.Keep all follow-up visits as told by your health care provider. This is important.This information is not intended to replace advice given to you by your health care provider. Make sure you discuss any questions you have with your health care provider.

## 2021-11-02 NOTE — ED PROVIDER NOTE - CARE PROVIDER_API CALL
Hussain Matthews)  Orthopaedic Surgery; Surgery of the Hand  [address] [place] [address]  Phone: [phone]  Fax: [phone]  Follow Up Time:

## 2021-11-02 NOTE — ED PROVIDER NOTE - NSICDXPASTMEDICALHX_GEN_ALL_CORE_FT
PAST MEDICAL HISTORY:  Anxiety     Hyperlipidemia     Hypertension     Memory difficulty     OA (osteoarthritis)

## 2021-11-02 NOTE — ED PROVIDER NOTE - PROGRESS NOTE DETAILS
Krystin DO: no fracture seen on xray; CT scans neg for acute trauma; will ambulate prior to d/c home; strict return precautions given.

## 2021-11-02 NOTE — ED PROVIDER NOTE - OBJECTIVE STATEMENT
89 y/o M w/ PMHx of HTN, HDL, OA Anxiety, Memory problems present to ED BIBEMS from home c/o R shoulder pain s/p mechanical fall in bathroom. Pt reports he was walking into bathroom and slipped hitting R shoulder on floor. Denies LOC or head injury, chest pain, SOB, or any other complaints. Pt currently lives w/ wife at home.
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
## 2021-11-02 NOTE — ED PROVIDER NOTE - PATIENT PORTAL LINK FT
You can access the FollowMyHealth Patient Portal offered by [facility] by registering at the following website: [email or web address]. By joining [facility]’s FollowMyHealth portal, you will also be able to view your health information using other applications (apps) compatible with our system.

## 2021-11-02 NOTE — ED ADULT NURSE NOTE - OBJECTIVE STATEMENT
89 y/o M w/ PMHx of HTN, HDL, OA Anxiety, Memory problems present to ED BIBEMS from home c/o R shoulder pain s/p mechanical fall in bathroom. Pt reports he was walking into bathroom and slipped hitting R shoulder on floor. Denies LOC or head injury, chest pain, SOB, or any other complaints. Pt currently lives w/ wife at home. + pulse, motor and sensation, no obvious bleeding swelling or deformity.

## 2021-11-02 NOTE — ED ADULT NURSE REASSESSMENT NOTE - NS ED NURSE REASSESS COMMENT FT1
Pt able to stand and ambulate with steady gait. Assisted to bedpan, cleaned and repositioned into wheelchair, daughter in ER to take pt home, pt provided with DC instructions.

## 2021-11-15 ENCOUNTER — NON-APPOINTMENT (OUTPATIENT)
Age: 86
End: 2021-11-15

## 2021-11-15 ENCOUNTER — APPOINTMENT (OUTPATIENT)
Dept: GERIATRICS | Facility: CLINIC | Age: 86
End: 2021-11-15
Payer: MEDICARE

## 2021-11-15 VITALS — OXYGEN SATURATION: 97 % | HEART RATE: 52 BPM | TEMPERATURE: 98 F | RESPIRATION RATE: 16 BRPM

## 2021-11-15 VITALS — BODY MASS INDEX: 25.84 KG/M2 | WEIGHT: 175 LBS

## 2021-11-15 VITALS — SYSTOLIC BLOOD PRESSURE: 140 MMHG | DIASTOLIC BLOOD PRESSURE: 70 MMHG

## 2021-11-15 DIAGNOSIS — Z63.6 DEPENDENT RELATIVE NEEDING CARE AT HOME: ICD-10-CM

## 2021-11-15 PROCEDURE — 99214 OFFICE O/P EST MOD 30 MIN: CPT

## 2021-11-15 PROCEDURE — 99204 OFFICE O/P NEW MOD 45 MIN: CPT

## 2021-11-15 SDOH — SOCIAL STABILITY - SOCIAL INSECURITY: DEPENDENT RELATIVE NEEDING CARE AT HOME: Z63.6

## 2021-11-15 NOTE — PHYSICAL EXAM
[Alert] : alert [Well Nourished] : well nourished [No Acute Distress] : in no acute distress [Well Developed] : well developed [Sclera] : the sclera and conjunctiva were normal [PERRL] : pupils were equal in size, round, and reactive to light [Normal Oral Mucosa] : normal oral mucosa [No Oral Pallor] : no oral pallor [Normal Outer Ear/Nose] : the ears and nose were normal in appearance [Normal Hearing] : hearing was normal [Both Tympanic Membranes Were Examined] : both tympanic membranes were normal [Normal Lips/Gums] : the lips and gums were normal [Normal Appearance] : the appearance of the neck was normal [No Neck Mass] : no neck mass was observed [Supple] : the neck was supple [No Respiratory Distress] : no respiratory distress [No Acc Muscle Use] : no accessory muscle use [Respiration, Rhythm And Depth] : normal respiratory rhythm and effort [Auscultation Breath Sounds / Voice Sounds] : lungs were clear to auscultation bilaterally [Normal PMI] : the apical impulse was abnormal [Normal S1, S2] : normal S1 and S2 [Heart Rate And Rhythm] : heart rate was normal and rhythm regular [Abdomen Tenderness] : non-tender [Bowel Sounds] : normal bowel sounds [Abdomen Soft] : soft [No Spinal Tenderness] : no spinal tenderness [Normal Color / Pigmentation] : normal skin color and pigmentation [Normal Turgor] : normal skin turgor [No Focal Deficits] : no focal deficits [Normal Affect] : the affect was normal [Normal Mood] : the mood was normal [de-identified] : AO x 1-2 (person - place) not time

## 2021-11-15 NOTE — HISTORY OF PRESENT ILLNESS
[Any fall with injury in past year] : Patient reported fall with injury in the past year [Patient is independent with] : bathing [Completely Dependent] : Completely dependent. [Full assistance needed] : Assistance needed managing medications [] : Assistance needed managing finances. [Walker] : walker [Smoke Detector] : smoke detector [Carbon Monoxide Detector] : carbon monoxide detector [Grab Bars] : grab bars [Shower Chair] : shower chair [Night Light] : night light [Anti-Slip Measures] : anti-slip measures [Wears Seat Belt] : wears seat belt [Wears Sunscreen] : wears sunscreen [0] : 2) Feeling down, depressed, or hopeless: Not at all (0) [FreeTextEntry1] : Mrs. Whitaker is a 89 yo M coming from home lives with his wife. Accompanied to current visit by his wife and daughter. History obtained from family mostly.  Ambulates with a walker. 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 He has memory problems for the past 5- 6 years, his wife reported is unsure of the specific type of dementia he was diagnosed with, denies any family hx dementia. His behavior / memory has significantly gotten worse over the past 6 moths. He sees a Neurologist Dr. Bradshaw and he has been adjusting some of him medications. \par \par His wife reported pt main problem lately has been his sleep, she reported he wakes up constantly to get up to want to drink water or to go to the bathroom. His daughter reported he was started on Seroquel 25mg at HS by Dr. Bradshaw to help him sleep but due to no improvement his dose was increased to 37.5mg (1.5 tab) but daughter reported the next day pts legs were weak and had increase falls and therefore it was NV'ed. She has been giving him Melatonin and Tylenol PM but with no improvement. His wife is his main caretaker and reported struggles with his inability to sleep. They have hired a new A Melida who has been helping in the evenings from 10PM to 6AM and they are in the process of increasing to 24/7 [facility] care.\par \par His daughter also reported pt gets panic attacks that started after he had his right eye injured from a golf ball 3 years ago. He was started on Sertraline 25mg daily for the past 3 years. \par \par She also reported problems surrounding toileting, pt has a fixation of playing with toilet paper and cleaning himself. This behavior was so constant that family reported pt damaged the pipes in his apartment. His HHA has tried assisting but the pt does not let her assist him. \par \par Complains of right shoulder pain. Family reported pt had a fall about 10 days ago while trying to get up to go the bathroom, he suddenly lost his balance and landed on his right shoulder on the floor. He was seen by Orthopedics and was told had not injury. Wife was giving him Tylenol as needed but stopped recently due to pt not complaining of pain. \par \par Requires assistance on ADLs like preparing meals, getting dressed, showering, doing groceries, finances, doctors appointments and transportation. He is able to ambulate with a walker and to eat independently. Wife helps with pill box. 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 \par \par Vaccinations: PNA / Shingles vaccine unsure when, reported could have been may years ago. Flu shot last week.\par \par Advance directives not in chart.\par  [Guns at Home] : no guns at home [Stair Lift] : no stair lift used in home [Driving Concerns] : not driving or driving without noted concerns [FreeTextEntry2] : SAM Montez / Wife [FreeTextEntry4] : SAM Montez / Wife [FreeTextEntry3] : SAM Montez / Wife [de-identified] : SAM Montez / Wife [de-identified] : SAM Montez / Wife [de-identified] : SAM Montez / Wife [de-identified] : SAM Montez / Wife [de-identified] : SAM Montez / Wife [FreeTextEntry6] : SAM Montez / Wife [FreeTextEntry7] : SAM Montez / Wife [de-identified] : SAM Monetz / Wife

## 2021-11-15 NOTE — REASON FOR VISIT
[Initial Evaluation] : an initial evaluation [Spouse] : spouse [Family Member] : family member [FreeTextEntry1] : memory problems

## 2021-11-15 NOTE — REVIEW OF SYSTEMS
[Confused] : confusion [Sleep Disturbances] : sleep disturbances [Fever] : no fever [Chills] : no chills [Feeling Poorly] : not feeling poorly [Feeling Tired] : not feeling tired [Eye Pain] : no eye pain [Red Eyes] : eyes not red [Eyesight Problems] : no eyesight problems [Discharge From Eyes] : no purulent discharge from the eyes [Earache] : no earache [Loss Of Hearing] : no hearing loss [Nosebleeds] : no nosebleeds [Nasal Discharge] : no nasal discharge [Heart Rate Is Slow] : the heart rate was not slow [Heart Rate Is Fast] : the heart rate was not fast [Chest Pain] : no chest pain [Palpitations] : no palpitations [Shortness Of Breath] : no shortness of breath [Wheezing] : no wheezing [Cough] : no cough [SOB on Exertion] : no shortness of breath during exertion [Abdominal Pain] : no abdominal pain [Vomiting] : no vomiting [Constipation] : no constipation [Diarrhea] : no diarrhea [Dysuria] : no dysuria [Incontinence] : no incontinence [Hesitancy] : no urinary hesitancy [Nocturia] : no nocturia [Joint Swelling] : no joint swelling [Joint Stiffness] : no joint stiffness [Limb Pain] : no limb pain [Limb Swelling] : no limb swelling [Skin Lesions] : no skin lesions [Skin Wound] : no skin wound [Itching] : no itching [Change In A Mole] : no change in a mole [Convulsions] : no convulsions [Dizziness] : no dizziness [Fainting] : no fainting [Suicidal] : not suicidal [Anxiety] : no anxiety [Depression] : no depression [FreeTextEntry7] : frequent bowel movements (x3 times per day)

## 2021-11-15 NOTE — DATA REVIEWED
[FreeTextEntry1] : EXAM:  XR HUMERUS MIN 2 VIEWS RT                        EXAM:  XR SHOULDER COMP MIN 2V RT                         PROCEDURE DATE:  11/02/2021      INTERPRETATION:  Right shoulder and right humerus. Patient has local pain.  Right shoulder. 3 views.  Rather mild degeneration around greater tuberosity.  No bone destruction or fracture.  Right humerus. 2 views.  No bone destruction or fracture.  IMPRESSION: No acute finding.

## 2021-11-15 NOTE — ASSESSMENT
[FreeTextEntry1] : - f/u for a cognitive assessment visit \par - daughter will get neurology and orthopedics records

## 2021-11-17 ENCOUNTER — APPOINTMENT (OUTPATIENT)
Dept: GERIATRICS | Facility: CLINIC | Age: 86
End: 2021-11-17
Payer: MEDICARE

## 2021-11-17 VITALS
SYSTOLIC BLOOD PRESSURE: 138 MMHG | RESPIRATION RATE: 16 BRPM | OXYGEN SATURATION: 96 % | TEMPERATURE: 97.5 F | DIASTOLIC BLOOD PRESSURE: 80 MMHG | HEART RATE: 75 BPM | WEIGHT: 175 LBS | BODY MASS INDEX: 25.92 KG/M2 | HEIGHT: 69 IN

## 2021-11-17 PROCEDURE — 99483 ASSMT & CARE PLN PT COG IMP: CPT

## 2021-11-22 NOTE — END OF VISIT
[] : Fellow [FreeTextEntry3] : Pt seen and examined with fellow agree with overall plan above, pt seen for a geriatric follow up due to worsening memory. MoCa test done and score 8/30 consistent with severe dementia, pt requires assistance on all ADL's and is incontinent. His behavior is controlled during the day but gets episodes of sundowning. Medications were recently adjusted by Neurology team with increase in Seroquel dose to 50mg at HS, on last visit pt was started on Remeron 7.5mg at HS. Daughter is in the process of getting 24/7 HHA and is working with an elderly . \par \par During visit also discussed dementia disease trajectory and importance of ACP, she reported they have completed a HCP. First he had assigned his wife but as per daughter "to relieve some of the pressure from her", her daughter was recently assigned as his main decision maker. Requested to bring a copy on the next visit.

## 2021-11-22 NOTE — HISTORY OF PRESENT ILLNESS
[Memory Lapses Or Loss] : worsened memory impairment [Completely Dependent] : Completely dependent. [With Patient/Caregiver] : , with patient/caregiver [Any fall with injury in past year] : Patient reported fall with injury in the past year [Cane] : cane [Walker] : walker [Smoke Detector] : smoke detector [Carbon Monoxide Detector] : carbon monoxide detector [Grab Bars] : grab bars [Shower Chair] : shower chair [Night Light] : night light [Anti-Slip Measures] : anti-slip measures [Wears Seat Belt] : wears seat belt [Wears Sunscreen] : wears sunscreen [0] : 2) Feeling down, depressed, or hopeless: Not at all (0) [Severe] : Stage: Severe [Worse] : Status: Worse [Patient Observed To Be Agitated] : worsened agitation [Hostility Toward Caregivers] : worsened aggression [Sleep Disturbances] : worsened sleep disturbances [] : worsened wandering [Fixed Beliefs Contradicted By Reality (Delusions)] : worsened delusions [Difficulty Finding Desired Words] : worsened difficulty finding desired words [___] : stable [unfilled] [FreeTextEntry1] : 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 Accompanied to current visit by his daughter. History obtained from family mostly. Ambulates with a walker. 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 He is here for cognitive testing today to establish baseline and tailor treatment. Namenda was discontinued as it caused increased GI side effects. Currently on Seroquel 50mg qhs. Patient was on Remeron 7.5mg qhs. Follows with Neurologist Dr. Bradshaw and he has been adjusting some of him medications. \par \par Mobility: ambulates with walker.\par \par ADLS/IADLS: Requires assistance on ADLs like preparing meals, getting dressed, showering, doing groceries, finances, doctors appointments and transportation. He is able to ambulate with a walker and to eat independently. Wife helps with pill box. She also reported problems surrounding toileting, pt has a fixation of playing with toilet paper and cleaning himself. This behavior was so constant that family reported pt damaged the pipes in his apartment. His HHA has tried assisting but the pt does not let her assist him. \par \par Depression: His daughter also reported pt gets panic attacks that started after he had his right eye injured from a golf ball 3 years ago. 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 \par \par MOCA completed today (Nov 2021): 8/20 [Guns at Home] : no guns at home [Stair Lift] : no stair lift used in home [Driving Concerns] : not driving or driving without noted concerns [de-identified] : pt is calm during the day but gets agitated and confused at night.  [AdvancecareDate] : 11/17/2021 [FreeTextEntry4] : discussed with pt's daughter Renetta, pt has HCP form and she will obtain copy and bring with her at next appointment. no MOLST form completed as yet, will continue discussion at next visit.

## 2021-11-22 NOTE — REASON FOR VISIT
[Follow-Up] : a follow-up visit [Family Member] : family member [FreeTextEntry1] : dementia [FreeTextEntry2] : daughter Renetta.

## 2021-11-22 NOTE — REVIEW OF SYSTEMS
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

## 2021-11-22 NOTE — PHYSICAL EXAM
[Alert] : alert [Well Nourished] : well nourished [No Acute Distress] : in no acute distress [Normal Oral Mucosa] : normal oral mucosa [Normal Outer Ear/Nose] : the ears and nose were normal in appearance [Normal Hearing] : hearing was normal [Normal Appearance] : the appearance of the neck was normal [No Respiratory Distress] : no respiratory distress [No Acc Muscle Use] : no accessory muscle use [Respiration, Rhythm And Depth] : normal respiratory rhythm and effort [Auscultation Breath Sounds / Voice Sounds] : lungs were clear to auscultation bilaterally [Normal S1, S2] : normal S1 and S2 [Heart Rate And Rhythm] : heart rate was normal and rhythm regular [Edema] : edema was not present [Bowel Sounds] : normal bowel sounds [Abdomen Tenderness] : non-tender [Abdomen Soft] : soft [No CVA Tenderness] : no CVA  tenderness [Normal Color / Pigmentation] : normal skin color and pigmentation [No Focal Deficits] : no focal deficits [Normal Affect] : the affect was normal [Normal Mood] : the mood was normal [___ / 5] : Visuospatial / Executive: [unfilled] / 5 [0 / 0] : Memory: 0 / 0 [___ / 3] : Attention (Serial 7 subtraction): [unfilled] / 3 [___ / 1] : Fluency: [unfilled] / 1 [___ / 2] : Abstraction: [unfilled] / 2 [___ / 5] : Delayed Recall: [unfilled] / 5 [___ / 6] : Orientation: [unfilled] / 6 [MocaTotal] : 8 [FreeTextEntry1] : 11/17/2021

## 2021-11-26 ENCOUNTER — NON-APPOINTMENT (OUTPATIENT)
Age: 86
End: 2021-11-26

## 2021-11-26 DIAGNOSIS — R52 PAIN, UNSPECIFIED: ICD-10-CM

## 2021-11-26 RX ORDER — DONEPEZIL HYDROCHLORIDE 10 MG/1
10 TABLET ORAL DAILY
Refills: 0 | Status: DISCONTINUED | COMMUNITY
End: 2021-11-26

## 2021-11-26 RX ORDER — MEMANTINE HYDROCHLORIDE 10 MG/1
10 TABLET, FILM COATED ORAL TWICE DAILY
Refills: 0 | Status: DISCONTINUED | COMMUNITY
End: 2021-11-26

## 2021-11-30 ENCOUNTER — EMERGENCY (EMERGENCY)
Facility: HOSPITAL | Age: 86
LOS: 0 days | Discharge: HOME | End: 2021-11-30
Attending: EMERGENCY MEDICINE
Payer: MEDICARE

## 2021-11-30 VITALS
HEIGHT: 68 IN | OXYGEN SATURATION: 99 % | DIASTOLIC BLOOD PRESSURE: 66 MMHG | WEIGHT: 179.9 LBS | TEMPERATURE: 98 F | HEART RATE: 73 BPM | RESPIRATION RATE: 18 BRPM | SYSTOLIC BLOOD PRESSURE: 164 MMHG

## 2021-11-30 VITALS — SYSTOLIC BLOOD PRESSURE: 157 MMHG | DIASTOLIC BLOOD PRESSURE: 72 MMHG

## 2021-11-30 DIAGNOSIS — Z91.018 ALLERGY TO OTHER FOODS: ICD-10-CM

## 2021-11-30 DIAGNOSIS — Z90.89 ACQUIRED ABSENCE OF OTHER ORGANS: ICD-10-CM

## 2021-11-30 DIAGNOSIS — M19.90 UNSPECIFIED OSTEOARTHRITIS, UNSPECIFIED SITE: ICD-10-CM

## 2021-11-30 DIAGNOSIS — F41.9 ANXIETY DISORDER, UNSPECIFIED: ICD-10-CM

## 2021-11-30 DIAGNOSIS — R09.81 NASAL CONGESTION: ICD-10-CM

## 2021-11-30 DIAGNOSIS — Z90.89 ACQUIRED ABSENCE OF OTHER ORGANS: Chronic | ICD-10-CM

## 2021-11-30 DIAGNOSIS — Z96.651 PRESENCE OF RIGHT ARTIFICIAL KNEE JOINT: ICD-10-CM

## 2021-11-30 DIAGNOSIS — I10 ESSENTIAL (PRIMARY) HYPERTENSION: ICD-10-CM

## 2021-11-30 DIAGNOSIS — E78.5 HYPERLIPIDEMIA, UNSPECIFIED: ICD-10-CM

## 2021-11-30 DIAGNOSIS — Z96.651 PRESENCE OF RIGHT ARTIFICIAL KNEE JOINT: Chronic | ICD-10-CM

## 2021-11-30 PROCEDURE — 99283 EMERGENCY DEPT VISIT LOW MDM: CPT

## 2021-11-30 RX ORDER — PHENYLEPHRINE HCL 0.25 %
2 AEROSOL, SPRAY WITH PUMP (ML) NASAL ONCE
Refills: 0 | Status: COMPLETED | OUTPATIENT
Start: 2021-11-30 | End: 2021-11-30

## 2021-11-30 RX ADMIN — Medication 2 SPRAY(S): at 04:28

## 2021-11-30 NOTE — ED PROVIDER NOTE - CLINICAL SUMMARY MEDICAL DECISION MAKING FREE TEXT BOX
Pt states he is here for nasal congestion and would like some nasal spray.  Pt denies CP or SOB.  He is alert and oriented.  His lungs are clear and he has normal O2 saturation.  Pt given Mateo-synephrine nasal spray in ED.

## 2021-11-30 NOTE — ED PROVIDER NOTE - PATIENT PORTAL LINK FT
You can access the FollowMyHealth Patient Portal offered by [facility] by registering at the following website: [email or web address]. By joining [facility]’s FollowMyHealth portal, you will also be able to view your health information using other applications (apps) compatible with our system.

## 2021-11-30 NOTE — ED PROVIDER NOTE - OBJECTIVE STATEMENT
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  Pt's wife states that he occasionally has panic attacks and screams that he can't breath due to nasal congestion.  Pt is noted to be in NAD at triage.  He states that his father was an ENT physician and would used to give him lyn-synephrine nasal spray.  He states he would like some nasal spray.  He denies any CP or SOB currently.

## 2021-11-30 NOTE — ED ADULT NURSE REASSESSMENT NOTE - NS ED NURSE REASSESS COMMENT FT1
delay in d/c  pt waiting on Daughter to pick pt up. daughter is Vannessa phone number is [phone]

## 2021-11-30 NOTE — ED ADULT NURSE NOTE - CHIEF COMPLAINT QUOTE
Per EMS, patient was sen in for "dry congestion". Called and spoke with wife Naomy at [phone] who states patient has dementia with anxiety and frequent panic attacks. Patient was at home and began yelling he could not breathe through his nose, started to panic and yelled to call 911. Wife called EMS and patient was taken to ED. At triage, patient calm and cooperative, no distress noted. VS stable. States he cannot breathe through his nose.

## 2021-11-30 NOTE — ED PROVIDER NOTE - CARE PROVIDER_API CALL
Zach Cheung)  Otolaryngology  [address] [place] [address]  Phone: [phone]  Fax: [phone]  Follow Up Time: Routine

## 2021-11-30 NOTE — ED ADULT TRIAGE NOTE - CHIEF COMPLAINT QUOTE
Per EMS, patient was sen in for "dry congestion". Called and spoke with wife Naomy at [phone] who states patient has dementia with anxiety and frequent panic attacks. Patient was at home and began yelling he could not breathe through his nose, started to panic and yelled to call 911. Wife called EMS and patient was taken to ED. At triage, patient calm and cooperative, no distress noted. VS stable. States he cannot breathe through his nose.

## 2021-11-30 NOTE — ED ADULT NURSE NOTE - OBJECTIVE STATEMENT
89 y/o male comes into ED for c/o of "nasal congestion." wife Naomy at [phone] states patient has dementia with anxiety and frequent panic attacks. airway is patent, vs WNL

## 2021-11-30 NOTE — ED PROVIDER NOTE - NSFOLLOWUPINSTRUCTIONS_ED_ALL_ED_FT
use Mateo-Synephrine 2 sprays in each nostril every 4 hours as needed for nasal congestion.  Return to the ER for shortness of breath, chest pain, weakness or other concerns.

## 2021-11-30 NOTE — ED ADULT NURSE NOTE - NS ED NURSE LEVEL OF CONSCIOUSNESS MENTAL STATUS
Okay to refill alprazolam once. Please call in. Spoke with patient by phone this morning 6-10 and recommended office visit, since alprazolam refills are more frequent and last visit was 1 year ago. Awake/Alert/Cooperative

## 2021-12-03 DIAGNOSIS — M12.811 OTHER SPECIFIC ARTHROPATHIES, NOT ELSEWHERE CLASSIFIED, RIGHT SHOULDER: ICD-10-CM

## 2021-12-13 ENCOUNTER — NON-APPOINTMENT (OUTPATIENT)
Age: 86
End: 2021-12-13

## 2021-12-24 RX ORDER — OLANZAPINE 7.5 MG/1
7.5 TABLET, FILM COATED ORAL
Qty: 30 | Refills: 3 | Status: COMPLETED | COMMUNITY
Start: 2021-12-20 | End: 2021-12-24

## 2021-12-28 RX ORDER — OLANZAPINE 10 MG/1
10 TABLET, FILM COATED ORAL
Qty: 30 | Refills: 0 | Status: DISCONTINUED | COMMUNITY
Start: 2021-12-24 | End: 2021-12-28

## 2021-12-29 ENCOUNTER — RX RENEWAL (OUTPATIENT)
Age: 86
End: 2021-12-29

## 2022-01-05 ENCOUNTER — RX RENEWAL (OUTPATIENT)
Age: 87
End: 2022-01-05

## 2022-01-14 ENCOUNTER — RX RENEWAL (OUTPATIENT)
Age: 87
End: 2022-01-14

## 2022-01-20 ENCOUNTER — RX RENEWAL (OUTPATIENT)
Age: 87
End: 2022-01-20

## 2022-02-25 RX ORDER — OLANZAPINE 2.5 MG/1
2.5 TABLET, FILM COATED ORAL
Qty: 10 | Refills: 0 | Status: DISCONTINUED | COMMUNITY
Start: 2021-12-13 | End: 2022-02-25

## 2022-02-28 ENCOUNTER — APPOINTMENT (OUTPATIENT)
Dept: INTERNAL MEDICINE | Facility: CLINIC | Age: 87
End: 2022-02-28

## 2022-03-01 RX ORDER — FUROSEMIDE 20 MG/1
20 TABLET ORAL DAILY
Refills: 0 | Status: DISCONTINUED | COMMUNITY
End: 2022-03-01

## 2022-03-14 ENCOUNTER — RX RENEWAL (OUTPATIENT)
Age: 87
End: 2022-03-14

## 2022-04-19 ENCOUNTER — RX RENEWAL (OUTPATIENT)
Age: 87
End: 2022-04-19

## 2022-04-25 ENCOUNTER — INPATIENT (INPATIENT)
Facility: HOSPITAL | Age: 87
LOS: 3 days | Discharge: HOME HEALTH | DRG: 871 | End: 2022-04-29
Attending: INTERNAL MEDICINE | Admitting: INTERNAL MEDICINE
Payer: MEDICARE

## 2022-04-25 VITALS
HEIGHT: 68 IN | DIASTOLIC BLOOD PRESSURE: 96 MMHG | RESPIRATION RATE: 18 BRPM | WEIGHT: 220.02 LBS | HEART RATE: 104 BPM | TEMPERATURE: 101 F | SYSTOLIC BLOOD PRESSURE: 168 MMHG | OXYGEN SATURATION: 93 %

## 2022-04-25 DIAGNOSIS — Z90.89 ACQUIRED ABSENCE OF OTHER ORGANS: Chronic | ICD-10-CM

## 2022-04-25 DIAGNOSIS — J18.9 PNEUMONIA, UNSPECIFIED ORGANISM: ICD-10-CM

## 2022-04-25 DIAGNOSIS — Z96.651 PRESENCE OF RIGHT ARTIFICIAL KNEE JOINT: Chronic | ICD-10-CM

## 2022-04-25 LAB
ALBUMIN SERPL ELPH-MCNC: 3.5 G/DL — SIGNIFICANT CHANGE UP (ref 3.3–5)
ALP SERPL-CCNC: 61 U/L — SIGNIFICANT CHANGE UP (ref 40–120)
ALT FLD-CCNC: 19 U/L — SIGNIFICANT CHANGE UP (ref 12–78)
ANION GAP SERPL CALC-SCNC: 7 MMOL/L — SIGNIFICANT CHANGE UP (ref 5–17)
APPEARANCE UR: CLEAR — SIGNIFICANT CHANGE UP
APTT BLD: 29.1 SEC — SIGNIFICANT CHANGE UP (ref 27.5–35.5)
AST SERPL-CCNC: 20 U/L — SIGNIFICANT CHANGE UP (ref 15–37)
BASOPHILS # BLD AUTO: 0 K/UL — SIGNIFICANT CHANGE UP (ref 0–0.2)
BASOPHILS NFR BLD AUTO: 0 % — SIGNIFICANT CHANGE UP (ref 0–2)
BILIRUB SERPL-MCNC: 1 MG/DL — SIGNIFICANT CHANGE UP (ref 0.2–1.2)
BILIRUB UR-MCNC: NEGATIVE — SIGNIFICANT CHANGE UP
BUN SERPL-MCNC: 23 MG/DL — SIGNIFICANT CHANGE UP (ref 7–23)
CALCIUM SERPL-MCNC: 9.4 MG/DL — SIGNIFICANT CHANGE UP (ref 8.5–10.1)
CHLORIDE SERPL-SCNC: 109 MMOL/L — HIGH (ref 96–108)
CO2 SERPL-SCNC: 25 MMOL/L — SIGNIFICANT CHANGE UP (ref 22–31)
COLOR SPEC: YELLOW — SIGNIFICANT CHANGE UP
CREAT SERPL-MCNC: 1.34 MG/DL — HIGH (ref 0.5–1.3)
DIFF PNL FLD: NEGATIVE — SIGNIFICANT CHANGE UP
EGFR: 50 ML/MIN/1.73M2 — LOW
EOSINOPHIL # BLD AUTO: 0 K/UL — SIGNIFICANT CHANGE UP (ref 0–0.5)
EOSINOPHIL NFR BLD AUTO: 0 % — SIGNIFICANT CHANGE UP (ref 0–6)
GLUCOSE SERPL-MCNC: 117 MG/DL — HIGH (ref 70–99)
GLUCOSE UR QL: NEGATIVE — SIGNIFICANT CHANGE UP
HCT VFR BLD CALC: 39.4 % — SIGNIFICANT CHANGE UP (ref 39–50)
HGB BLD-MCNC: 13.2 G/DL — SIGNIFICANT CHANGE UP (ref 13–17)
INR BLD: 1.17 RATIO — HIGH (ref 0.88–1.16)
KETONES UR-MCNC: NEGATIVE — SIGNIFICANT CHANGE UP
LACTATE SERPL-SCNC: 2 MMOL/L — SIGNIFICANT CHANGE UP (ref 0.7–2)
LEUKOCYTE ESTERASE UR-ACNC: NEGATIVE — SIGNIFICANT CHANGE UP
LYMPHOCYTES # BLD AUTO: 0.55 K/UL — LOW (ref 1–3.3)
LYMPHOCYTES # BLD AUTO: 4 % — LOW (ref 13–44)
MCHC RBC-ENTMCNC: 32.4 PG — SIGNIFICANT CHANGE UP (ref 27–34)
MCHC RBC-ENTMCNC: 33.5 GM/DL — SIGNIFICANT CHANGE UP (ref 32–36)
MCV RBC AUTO: 96.8 FL — SIGNIFICANT CHANGE UP (ref 80–100)
MONOCYTES # BLD AUTO: 1.8 K/UL — HIGH (ref 0–0.9)
MONOCYTES NFR BLD AUTO: 13 % — SIGNIFICANT CHANGE UP (ref 2–14)
NEUTROPHILS # BLD AUTO: 11.48 K/UL — HIGH (ref 1.8–7.4)
NEUTROPHILS NFR BLD AUTO: 83 % — HIGH (ref 43–77)
NITRITE UR-MCNC: NEGATIVE — SIGNIFICANT CHANGE UP
NRBC # BLD: SIGNIFICANT CHANGE UP /100 WBCS (ref 0–0)
PH UR: 6 — SIGNIFICANT CHANGE UP (ref 5–8)
PLATELET # BLD AUTO: 177 K/UL — SIGNIFICANT CHANGE UP (ref 150–400)
POTASSIUM SERPL-MCNC: 4.1 MMOL/L — SIGNIFICANT CHANGE UP (ref 3.5–5.3)
POTASSIUM SERPL-SCNC: 4.1 MMOL/L — SIGNIFICANT CHANGE UP (ref 3.5–5.3)
PROT SERPL-MCNC: 7.2 GM/DL — SIGNIFICANT CHANGE UP (ref 6–8.3)
PROT UR-MCNC: NEGATIVE — SIGNIFICANT CHANGE UP
PROTHROM AB SERPL-ACNC: 13.6 SEC — HIGH (ref 10.5–13.4)
RBC # BLD: 4.07 M/UL — LOW (ref 4.2–5.8)
RBC # FLD: 12.5 % — SIGNIFICANT CHANGE UP (ref 10.3–14.5)
SODIUM SERPL-SCNC: 141 MMOL/L — SIGNIFICANT CHANGE UP (ref 135–145)
SP GR SPEC: 1.01 — SIGNIFICANT CHANGE UP (ref 1.01–1.02)
UROBILINOGEN FLD QL: NEGATIVE — SIGNIFICANT CHANGE UP
WBC # BLD: 13.83 K/UL — HIGH (ref 3.8–10.5)
WBC # FLD AUTO: 13.83 K/UL — HIGH (ref 3.8–10.5)

## 2022-04-25 PROCEDURE — 71045 X-RAY EXAM CHEST 1 VIEW: CPT | Mod: 26

## 2022-04-25 PROCEDURE — 97162 PT EVAL MOD COMPLEX 30 MIN: CPT | Mod: GP

## 2022-04-25 PROCEDURE — 71250 CT THORAX DX C-: CPT | Mod: 26,MG

## 2022-04-25 PROCEDURE — 99285 EMERGENCY DEPT VISIT HI MDM: CPT | Mod: CS

## 2022-04-25 PROCEDURE — 73030 X-RAY EXAM OF SHOULDER: CPT | Mod: RT

## 2022-04-25 PROCEDURE — 80048 BASIC METABOLIC PNL TOTAL CA: CPT

## 2022-04-25 PROCEDURE — G1004: CPT

## 2022-04-25 PROCEDURE — 85027 COMPLETE CBC AUTOMATED: CPT

## 2022-04-25 PROCEDURE — 99223 1ST HOSP IP/OBS HIGH 75: CPT

## 2022-04-25 PROCEDURE — 36415 COLL VENOUS BLD VENIPUNCTURE: CPT

## 2022-04-25 PROCEDURE — 99497 ADVNCD CARE PLAN 30 MIN: CPT | Mod: 25

## 2022-04-25 PROCEDURE — 92523 SPEECH SOUND LANG COMPREHEN: CPT | Mod: GN

## 2022-04-25 PROCEDURE — 92610 EVALUATE SWALLOWING FUNCTION: CPT | Mod: GN

## 2022-04-25 PROCEDURE — 97116 GAIT TRAINING THERAPY: CPT | Mod: GP

## 2022-04-25 PROCEDURE — 97530 THERAPEUTIC ACTIVITIES: CPT | Mod: GP

## 2022-04-25 RX ORDER — ACETAMINOPHEN 500 MG
650 TABLET ORAL EVERY 6 HOURS
Refills: 0 | Status: DISCONTINUED | OUTPATIENT
Start: 2022-04-25 | End: 2022-04-29

## 2022-04-25 RX ORDER — LANOLIN ALCOHOL/MO/W.PET/CERES
3 CREAM (GRAM) TOPICAL AT BEDTIME
Refills: 0 | Status: DISCONTINUED | OUTPATIENT
Start: 2022-04-25 | End: 2022-04-29

## 2022-04-25 RX ORDER — ACETAMINOPHEN 500 MG
650 TABLET ORAL ONCE
Refills: 0 | Status: COMPLETED | OUTPATIENT
Start: 2022-04-25 | End: 2022-04-25

## 2022-04-25 RX ORDER — LUTEIN 20 MG
1 CAPSULE ORAL
Qty: 0 | Refills: 0 | DISCHARGE

## 2022-04-25 RX ORDER — CHOLECALCIFEROL (VITAMIN D3) 125 MCG
1 CAPSULE ORAL
Qty: 0 | Refills: 0 | DISCHARGE

## 2022-04-25 RX ORDER — ONDANSETRON 8 MG/1
4 TABLET, FILM COATED ORAL EVERY 8 HOURS
Refills: 0 | Status: DISCONTINUED | OUTPATIENT
Start: 2022-04-25 | End: 2022-04-29

## 2022-04-25 RX ORDER — TAMSULOSIN HYDROCHLORIDE 0.4 MG/1
1 CAPSULE ORAL
Qty: 0 | Refills: 0 | DISCHARGE

## 2022-04-25 RX ORDER — PIPERACILLIN AND TAZOBACTAM 4; .5 G/20ML; G/20ML
3.38 INJECTION, POWDER, LYOPHILIZED, FOR SOLUTION INTRAVENOUS EVERY 8 HOURS
Refills: 0 | Status: DISCONTINUED | OUTPATIENT
Start: 2022-04-25 | End: 2022-04-28

## 2022-04-25 RX ORDER — HEPARIN SODIUM 5000 [USP'U]/ML
5000 INJECTION INTRAVENOUS; SUBCUTANEOUS EVERY 12 HOURS
Refills: 0 | Status: DISCONTINUED | OUTPATIENT
Start: 2022-04-25 | End: 2022-04-29

## 2022-04-25 RX ORDER — AZITHROMYCIN 500 MG/1
500 TABLET, FILM COATED ORAL EVERY 24 HOURS
Refills: 0 | Status: DISCONTINUED | OUTPATIENT
Start: 2022-04-26 | End: 2022-04-29

## 2022-04-25 RX ORDER — CEFEPIME 1 G/1
2000 INJECTION, POWDER, FOR SOLUTION INTRAMUSCULAR; INTRAVENOUS ONCE
Refills: 0 | Status: COMPLETED | OUTPATIENT
Start: 2022-04-25 | End: 2022-04-25

## 2022-04-25 RX ORDER — DONEPEZIL HYDROCHLORIDE 10 MG/1
1 TABLET, FILM COATED ORAL
Qty: 0 | Refills: 0 | DISCHARGE

## 2022-04-25 RX ORDER — QUETIAPINE FUMARATE 200 MG/1
12.5 TABLET, FILM COATED ORAL AT BEDTIME
Refills: 0 | Status: DISCONTINUED | OUTPATIENT
Start: 2022-04-25 | End: 2022-04-29

## 2022-04-25 RX ORDER — PIPERACILLIN AND TAZOBACTAM 4; .5 G/20ML; G/20ML
3.38 INJECTION, POWDER, LYOPHILIZED, FOR SOLUTION INTRAVENOUS ONCE
Refills: 0 | Status: COMPLETED | OUTPATIENT
Start: 2022-04-25 | End: 2022-04-25

## 2022-04-25 RX ORDER — MIRTAZAPINE 45 MG/1
15 TABLET, ORALLY DISINTEGRATING ORAL AT BEDTIME
Refills: 0 | Status: DISCONTINUED | OUTPATIENT
Start: 2022-04-25 | End: 2022-04-29

## 2022-04-25 RX ORDER — AZITHROMYCIN 500 MG/1
500 TABLET, FILM COATED ORAL ONCE
Refills: 0 | Status: COMPLETED | OUTPATIENT
Start: 2022-04-25 | End: 2022-04-25

## 2022-04-25 RX ORDER — MEMANTINE HYDROCHLORIDE 10 MG/1
0 TABLET ORAL
Qty: 0 | Refills: 0 | DISCHARGE

## 2022-04-25 RX ORDER — VANCOMYCIN HCL 1 G
1500 VIAL (EA) INTRAVENOUS ONCE
Refills: 0 | Status: COMPLETED | OUTPATIENT
Start: 2022-04-25 | End: 2022-04-25

## 2022-04-25 RX ORDER — GINKGO BILOBA 40 MG
1 CAPSULE ORAL
Qty: 0 | Refills: 0 | DISCHARGE

## 2022-04-25 RX ORDER — AZITHROMYCIN 500 MG/1
TABLET, FILM COATED ORAL
Refills: 0 | Status: DISCONTINUED | OUTPATIENT
Start: 2022-04-25 | End: 2022-04-29

## 2022-04-25 RX ORDER — SERTRALINE 25 MG/1
25 TABLET, FILM COATED ORAL DAILY
Refills: 0 | Status: DISCONTINUED | OUTPATIENT
Start: 2022-04-25 | End: 2022-04-29

## 2022-04-25 RX ORDER — METOPROLOL TARTRATE 50 MG
25 TABLET ORAL
Qty: 0 | Refills: 0 | DISCHARGE

## 2022-04-25 RX ORDER — GABAPENTIN 400 MG/1
600 CAPSULE ORAL AT BEDTIME
Refills: 0 | Status: DISCONTINUED | OUTPATIENT
Start: 2022-04-25 | End: 2022-04-29

## 2022-04-25 RX ORDER — SODIUM CHLORIDE 9 MG/ML
2100 INJECTION INTRAMUSCULAR; INTRAVENOUS; SUBCUTANEOUS ONCE
Refills: 0 | Status: COMPLETED | OUTPATIENT
Start: 2022-04-25 | End: 2022-04-25

## 2022-04-25 RX ADMIN — Medication 1200 MILLIGRAM(S): at 18:07

## 2022-04-25 RX ADMIN — Medication 1200 MILLIGRAM(S): at 20:38

## 2022-04-25 RX ADMIN — Medication 650 MILLIGRAM(S): at 11:53

## 2022-04-25 RX ADMIN — HEPARIN SODIUM 5000 UNIT(S): 5000 INJECTION INTRAVENOUS; SUBCUTANEOUS at 20:39

## 2022-04-25 RX ADMIN — PIPERACILLIN AND TAZOBACTAM 200 GRAM(S): 4; .5 INJECTION, POWDER, LYOPHILIZED, FOR SOLUTION INTRAVENOUS at 17:41

## 2022-04-25 RX ADMIN — SODIUM CHLORIDE 2100 MILLILITER(S): 9 INJECTION INTRAMUSCULAR; INTRAVENOUS; SUBCUTANEOUS at 10:49

## 2022-04-25 RX ADMIN — Medication 250 MILLIGRAM(S): at 15:44

## 2022-04-25 RX ADMIN — MIRTAZAPINE 15 MILLIGRAM(S): 45 TABLET, ORALLY DISINTEGRATING ORAL at 20:39

## 2022-04-25 RX ADMIN — CEFEPIME 100 MILLIGRAM(S): 1 INJECTION, POWDER, FOR SOLUTION INTRAMUSCULAR; INTRAVENOUS at 10:50

## 2022-04-25 RX ADMIN — PIPERACILLIN AND TAZOBACTAM 25 GRAM(S): 4; .5 INJECTION, POWDER, LYOPHILIZED, FOR SOLUTION INTRAVENOUS at 20:42

## 2022-04-25 RX ADMIN — AZITHROMYCIN 500 MILLIGRAM(S): 500 TABLET, FILM COATED ORAL at 17:52

## 2022-04-25 RX ADMIN — GABAPENTIN 600 MILLIGRAM(S): 400 CAPSULE ORAL at 20:39

## 2022-04-25 NOTE — H&P ADULT - ASSESSMENT
89 y/o male with PMHX of moderate dementia (oriented to person +/- place not date at baseline), anxiety/ depression, heart murmur and OA who presented to  with CC of fever and cough from home.  CXR/ CT of the chest with bilateral multifocal PNA.      #Acute hypoxic respiratory failure secondary to Sepsis from Multifocal PNA:    Admit to medsurg.    F/u pancultures.    Concern for possible aspiration with bilateral infiltrates and R>>L.  Daughter notes may have some issues with swallowing.    No concern for HCAP-- lives at home and no recent hospitalizations.    Tx with zosyn to treat aspiration PNA and azithro to cover atypicals.    Follow temps.    Follow labs.  WBC 13.8.  Trend.    Follow oxygenation-- presently on 2L.    Speech and swallow eval.  Soft and bite sized diet for now.      #Weakness:    Secondary to infection.    PT eval.      #Dementia/ Depression:    Hx of sundowning at night.    Cont remeron/ zoloft.    Seroquel PRN.    May require constant observation.      #AMA versus CKD:    Cr 1.3.  Suspect near baseline.  Trend labs.      #Advanced Directives/ Goals of Care:  FULL CODE  D/w patient's daughter Brenda Whitaker,.  HCP is wife Naomy Whitaker.    We reviewed his advanced directives which state he wouldn't want his life prolonged if their was no hope.  Daughter believes wife would want a trial of resuscitation/ intubation.  FULL CODE for now.  She will discuss further with her mother and f/u with us if any changes.    Total time spent discussing goals of care:  10 min.

## 2022-04-25 NOTE — ED PROVIDER NOTE - TOBACCO USE
Patient called and a refill on her Oxycodoe. Patient indicated that she is completley out.  Patient needs the medication called to [facility]     Last Appt 12.11.2018     Next Appt  01.09.2018 Unknown if ever smoked

## 2022-04-25 NOTE — H&P ADULT - HISTORY OF PRESENT ILLNESS
89 y/o male with PMHX of moderate dementia (oriented to person +/- place not date at baseline), anxiety/ depression, heart murmur and OA who presented to  with CC of fever and cough from home.  History is obtained from daughter at bedside.  HCP is wife.  Daughter notes her dad lives at home with wife and son.  Wife and son take care of him during the day and they have an aide who helps them at night due to sundowning/ confusion.  He is normally able to ambulate with help with a walker.  She notes her father was in his usual state of health up until this morning.  When we got up, his wife noted he had a fever to 103.  He c/o of a HA.  Family also noted a cough.  They called 911 brought him to the ER.  In the ER, patient febrile to 101.7.  CXR/ CT of the chest with bilateral multifocal PNA.  Patient was pancultred and started on IV ABX.  No recent hospitalizations in last 6 months.  Lives at home.  No sick contacts.  COVID/ RVP negative.  Patient given vanco/ cefepime by the ER.  Daughter notes her dad does sometimes choke/ cough after eating.  She is not sure if he could be aspirating.        PAST MEDICAL & SURGICAL HISTORY:  Hypertension  Anxiety  Hyperlipidemia  OA (osteoarthritis)  History of tonsillectomy  H/O total knee replacement, right      FAMILY HISTORY:  Both parents .    Has son and daughter-- A&W.      Social History:    Lives at home with wife and son.    No tob.  No ETOH.      Allergies:  No Known Allergies    Home Medications:  cyanocobalamin 500 mcg oral tablet: 1 tab(s) orally once a day (2022 16:09)  furosemide 20 mg oral tablet: 1 tab(s) orally once a day (2022 16:09)  gabapentin 300 mg oral capsule: 2 cap(s) orally once a day (at bedtime) (2022 16:09)  mirtazapine 15 mg oral tablet: 1 tab(s) orally once a day (at bedtime) (2022 16:09)  sertraline 25 mg oral tablet: 1 tab(s) orally once a day (2022 14:27)  Vitamin D3 25 mcg (1000 intl units) oral tablet: 1 tab(s) orally once a day (2022 16:09)

## 2022-04-25 NOTE — ED PROVIDER NOTE - NSICDXPASTMEDICALHX_GEN_ALL_CORE_FT
PAST MEDICAL HISTORY:  Anxiety     Hyperlipidemia     Hypertension     OA (osteoarthritis)       BPH (benign prostatic hyperplasia)     Dementia     Depression

## 2022-04-25 NOTE — ED ADULT NURSE REASSESSMENT NOTE - NS ED NURSE REASSESS COMMENT FT1
Resumed care from JONI Carbajal. Safety and comfort measures maintained. Patient confused at this time. Patient repositioned. Call bell within reach.

## 2022-04-25 NOTE — ED ADULT NURSE NOTE - CHIEF COMPLAINT QUOTE
GERSON FROM THE FACILITY CALLED BACK REGARDING PT. I INFORMED HER OF THE MESSAGE BELOW AND SHE WOULD LIKE TO KNOW IF SHE IS ONLY WEIGHTBEARING DURING TRANSFER OR ANYTIME?   patient brought in by EMS from home c/o fever.  per EMS, patient woke up this morning with fever (t max 102).  patient stating he fell, unsure when/how? authored RN spoke with daughter who states patient has advanced dementia and did not fall, daughter called EMS d/t fever.  daughter, Brenda, can be contacted at [phone].  patient is A&Ox1 in triage.

## 2022-04-25 NOTE — ED PROVIDER NOTE - PROGRESS NOTE DETAILS
JUAN JOSÉ Caballero MD:  Elevated WBC cnt, CT chest + multifocal PBNA.  IV Abx already ordered, infusing.  Hospitlaist called for admission, phone message left. C MD Ada:  Elevated WBC cnt, CT chest + multifocal PNA.  IV Abx already ordered, infusing.  Hospitlaist called for admission, phone message left.

## 2022-04-25 NOTE — PATIENT PROFILE ADULT - FALL HARM RISK - HARM RISK INTERVENTIONS
Assistance with ambulation/Assistance OOB with selected safe patient handling equipment/Communicate Risk of Fall with Harm to all staff/Monitor for mental status changes/Move patient closer to nurses' station/Reinforce activity limits and safety measures with patient and family/Reorient to person, place and time as needed/Tailored Fall Risk Interventions/Toileting schedule using arm’s reach rule for commode and bathroom/Use of alarms - bed, chair and/or voice tab/Visual Cue: Yellow wristband and red socks/Bed in lowest position, wheels locked, appropriate side rails in place/Call bell, personal items and telephone in reach/Instruct patient to call for assistance before getting out of bed or chair/Non-slip footwear when patient is out of bed/[place] to call system/Physically safe environment - no spills, clutter or unnecessary equipment/Purposeful Proactive Rounding/Room/bathroom lighting operational, light cord in reach

## 2022-04-25 NOTE — PATIENT PROFILE ADULT - VISION (WITH CORRECTIVE LENSES IF THE PATIENT USUALLY WEARS THEM):
right eye patch/Partially impaired: cannot see medication labels or newsprint, but can see obstacles in path, and the surrounding layout; can count fingers at arm's length

## 2022-04-25 NOTE — H&P ADULT - NSHPPHYSICALEXAM_GEN_ALL_CORE
PEx  T(C): 36.7 (04-25-22 @ 15:16), Max: 38.4 (04-25-22 @ 09:31)  HR: 79 (04-25-22 @ 15:16) (79 - 104)  BP: 130/64 (04-25-22 @ 15:16) (108/44 - 168/96)  RR: 22 (04-25-22 @ 15:16) (18 - 24)  SpO2: 95% (04-25-22 @ 15:16) (89% - 96%)  Wt(kg): --  General:   elderly male.  NAD.    Skin: no rash or prominent lesions  Head: normocephalic, atraumatic     Sinuses: non-tender  Nose: no external lesions, mucosa non-inflamed, septum and turbinates normal  Throat: no erythema, exudates or lesions.  Neck: Supple without lymphadenopathy. Thyroid no thyromegaly, no palpable thyroid nodules, no palpable nodules or masses, carotid arteries no bruits.   Breasts: No palpable masses or lesions.  Heart: +murmur II/VI.  RRR.    Lungs: crackles at bases  Chest wall: Normal insp   Abdomen:  Soft, NT/ND, normal bowel sounds, no HSM, no masses.  No peritoneal signs.   Back: spine normal without deformity or tenderness.  Normal ROM   : Exam normal.  no inguinal hernias.  Extremities: No deformities, clubbing, cyanosis, or edema.  Musculoskeletal: Normal gait and station. No decreased range of motion, instability, atrophy or abnormal strength or tone in the head, neck, spine, ribs, pelvis or extremities.   Neurologic: CN 2-12 normal. Sensation to pain, touch and proprioception normal. DTRs normal in upper and lower extremities. No pathologic reflexes.  Motor normal.  Psychiatric: pleasantly confused. NAD.  oriented to person / family.  not time.

## 2022-04-25 NOTE — ED PROVIDER NOTE - OBJECTIVE STATEMENT
89 y/o male with a PMHx of HTN, dementia, BPH, depression, gait instability, HLD, s/p tonsillectomy presents to the ED BIBA from home with fever. Pt recalls here is here for fever, but does not recall how long. Pt endorses cough. Denies SOB, n/v/d, headache, abdominal pain, chest pain, or dysuria. Pt admits to increased urination. Pt is a poor historian secondary to dementia.

## 2022-04-25 NOTE — ED PROVIDER NOTE - CLINICAL SUMMARY MEDICAL DECISION MAKING FREE TEXT BOX
89 y/o male with a PMHx of HTN, dementia, BPH, depression, gait instability, HLD, s/p tonsillectomy presents to the ED BIBA from home with fever. Pt recalls here is here for fever, but does not recall how long. Pt endorses cough. Pt is a por historian due ot dementia. Pt meets sepsis criteria.   Plan: Sepsis Alert- EKG, CXR, sepsis labs including RVP/CVOID swab, sepsis IV fluids, IV Cefepime, Tylenol, monitor, observe, reassess.

## 2022-04-25 NOTE — ED PROVIDER NOTE - NEUROLOGICAL, MLM
Alert, no focal deficits, no motor or sensory deficits. CN grossly intact, poor historian due ot +dementia, +disorientation .

## 2022-04-25 NOTE — ED ADULT NURSE NOTE - OBJECTIVE STATEMENT
patient brought in by EMS from home c/o fever.  per EMS, patient woke up this morning with fever (t max 102).  patient has advanced dementia as per dtr. daughter called EMS d/t fever.  daughter, Brenda, can be contacted at [phone].  patient is A&Ox1.

## 2022-04-25 NOTE — ED ADULT TRIAGE NOTE - CHIEF COMPLAINT QUOTE
patient brought in by EMS from home c/o fever.  per EMS, patient woke up this morning with fever (t max 102).  patient stating he fell, unsure when/how? authored RN spoke with daughter who states patient has advanced dementia and did not fall, daughter called EMS d/t fever.  daughter, Brenda, can be contacted at [phone].  patient is A&Ox1 in triage.

## 2022-04-25 NOTE — ED ADULT NURSE REASSESSMENT NOTE - NS ED NURSE REASSESS COMMENT FT1
Pt received AAOx2, confused. Safety precautions in place. VSS. IV abx infusing. Daughter bedside. Nsg will cont to monitor.

## 2022-04-25 NOTE — ED PROVIDER NOTE - MUSCULOSKELETAL, MLM
Spine appears normal, range of motion is not limited, no muscle or joint tenderness. PAGE x4, no focal swelling or tenderness.

## 2022-04-26 ENCOUNTER — TRANSCRIPTION ENCOUNTER (OUTPATIENT)
Age: 87
End: 2022-04-26

## 2022-04-26 LAB
ANION GAP SERPL CALC-SCNC: 3 MMOL/L — LOW (ref 5–17)
BUN SERPL-MCNC: 19 MG/DL — SIGNIFICANT CHANGE UP (ref 7–23)
CALCIUM SERPL-MCNC: 8.8 MG/DL — SIGNIFICANT CHANGE UP (ref 8.5–10.1)
CHLORIDE SERPL-SCNC: 114 MMOL/L — HIGH (ref 96–108)
CO2 SERPL-SCNC: 26 MMOL/L — SIGNIFICANT CHANGE UP (ref 22–31)
CREAT SERPL-MCNC: 1.21 MG/DL — SIGNIFICANT CHANGE UP (ref 0.5–1.3)
CULTURE RESULTS: NO GROWTH — SIGNIFICANT CHANGE UP
EGFR: 57 ML/MIN/1.73M2 — LOW
GLUCOSE SERPL-MCNC: 94 MG/DL — SIGNIFICANT CHANGE UP (ref 70–99)
HCT VFR BLD CALC: 36.4 % — LOW (ref 39–50)
HGB BLD-MCNC: 12 G/DL — LOW (ref 13–17)
MCHC RBC-ENTMCNC: 32.6 PG — SIGNIFICANT CHANGE UP (ref 27–34)
MCHC RBC-ENTMCNC: 33 GM/DL — SIGNIFICANT CHANGE UP (ref 32–36)
MCV RBC AUTO: 98.9 FL — SIGNIFICANT CHANGE UP (ref 80–100)
PLATELET # BLD AUTO: 134 K/UL — LOW (ref 150–400)
POTASSIUM SERPL-MCNC: 3.9 MMOL/L — SIGNIFICANT CHANGE UP (ref 3.5–5.3)
POTASSIUM SERPL-SCNC: 3.9 MMOL/L — SIGNIFICANT CHANGE UP (ref 3.5–5.3)
RBC # BLD: 3.68 M/UL — LOW (ref 4.2–5.8)
RBC # FLD: 13.1 % — SIGNIFICANT CHANGE UP (ref 10.3–14.5)
SODIUM SERPL-SCNC: 143 MMOL/L — SIGNIFICANT CHANGE UP (ref 135–145)
SPECIMEN SOURCE: SIGNIFICANT CHANGE UP
WBC # BLD: 7.29 K/UL — SIGNIFICANT CHANGE UP (ref 3.8–10.5)
WBC # FLD AUTO: 7.29 K/UL — SIGNIFICANT CHANGE UP (ref 3.8–10.5)

## 2022-04-26 PROCEDURE — 99233 SBSQ HOSP IP/OBS HIGH 50: CPT

## 2022-04-26 RX ADMIN — QUETIAPINE FUMARATE 12.5 MILLIGRAM(S): 200 TABLET, FILM COATED ORAL at 21:40

## 2022-04-26 RX ADMIN — HEPARIN SODIUM 5000 UNIT(S): 5000 INJECTION INTRAVENOUS; SUBCUTANEOUS at 19:23

## 2022-04-26 RX ADMIN — SERTRALINE 25 MILLIGRAM(S): 25 TABLET, FILM COATED ORAL at 09:26

## 2022-04-26 RX ADMIN — QUETIAPINE FUMARATE 12.5 MILLIGRAM(S): 200 TABLET, FILM COATED ORAL at 00:51

## 2022-04-26 RX ADMIN — GABAPENTIN 600 MILLIGRAM(S): 400 CAPSULE ORAL at 19:22

## 2022-04-26 RX ADMIN — Medication 1200 MILLIGRAM(S): at 19:22

## 2022-04-26 RX ADMIN — HEPARIN SODIUM 5000 UNIT(S): 5000 INJECTION INTRAVENOUS; SUBCUTANEOUS at 09:27

## 2022-04-26 RX ADMIN — PIPERACILLIN AND TAZOBACTAM 25 GRAM(S): 4; .5 INJECTION, POWDER, LYOPHILIZED, FOR SOLUTION INTRAVENOUS at 05:31

## 2022-04-26 RX ADMIN — AZITHROMYCIN 255 MILLIGRAM(S): 500 TABLET, FILM COATED ORAL at 17:08

## 2022-04-26 RX ADMIN — PIPERACILLIN AND TAZOBACTAM 25 GRAM(S): 4; .5 INJECTION, POWDER, LYOPHILIZED, FOR SOLUTION INTRAVENOUS at 21:13

## 2022-04-26 RX ADMIN — MIRTAZAPINE 15 MILLIGRAM(S): 45 TABLET, ORALLY DISINTEGRATING ORAL at 19:22

## 2022-04-26 RX ADMIN — Medication 1200 MILLIGRAM(S): at 09:26

## 2022-04-26 RX ADMIN — PIPERACILLIN AND TAZOBACTAM 25 GRAM(S): 4; .5 INJECTION, POWDER, LYOPHILIZED, FOR SOLUTION INTRAVENOUS at 13:09

## 2022-04-26 NOTE — SWALLOW BEDSIDE ASSESSMENT ADULT - COMMENTS
Pt admitted to  with fever and cough. Hospital course is notable for hypoxia, bilateral multi focal pneumonia, and renal insufficiency. This profile is superimposed upon a history of Dementia, depression, anxiety, OA, HLD, prior tonsillectomy and previous right knee replacement.

## 2022-04-26 NOTE — SWALLOW BEDSIDE ASSESSMENT ADULT - SLP GENERAL OBSERVATIONS
The pt was alert and interactive. He was distractible at times. Pt was able to verbalize during communicative probes without evidence of a primary motor speech or primary linguistic pathology. Of note is that his utterances sometimes reflected reduced orientation/memory/insight consistent with baseline chronic Cognitive Dysfunction associated with Dementia. Though, it is noted that he was often able to verbalize needs and feels he is at communicative baseline.

## 2022-04-26 NOTE — PHYSICAL THERAPY INITIAL EVALUATION ADULT - GENERAL OBSERVATIONS, REHAB EVAL
Pt rec'd supine in bed, 1:1 CO present, pt pleasant and cooperative with PT, no c/o pain, eye patch over right eye.

## 2022-04-26 NOTE — PHYSICAL THERAPY INITIAL EVALUATION ADULT - RANGE OF MOTION EXAMINATION, REHAB EVAL
except right shoulder elevation limited 0-100 deg/bilateral upper extremity ROM was WFL (within functional limits)/bilateral lower extremity ROM was WFL (within functional limits)

## 2022-04-26 NOTE — DISCHARGE NOTE NURSING/CASE MANAGEMENT/SOCIAL WORK - PATIENT PORTAL LINK FT
You can access the FollowMyHealth Patient Portal offered by [facility] by registering at the following website: [email or web address] By joining [email or web address] FollowMyHealth portal, you will also be able to view your health information using other applications (apps) compatible with our system.

## 2022-04-26 NOTE — DISCHARGE NOTE NURSING/CASE MANAGEMENT/SOCIAL WORK - NSDCFUADDAPPT_GEN_ALL_CORE_FT
Susanne Phillips MD  [address] [place] [address], [address]  [place] [address]  [phone]  Thursaday May 5th @ 11:30

## 2022-04-26 NOTE — PHYSICAL THERAPY INITIAL EVALUATION ADULT - ADDITIONAL COMMENTS
Daughter notes her dad lives at home with wife and son.  Wife and son take care of him during the day and they have an aide who helps them at night due to sundowning/ confusion.  He is normally able to ambulate with help with a walker.

## 2022-04-26 NOTE — SWALLOW BEDSIDE ASSESSMENT ADULT - SWALLOW EVAL: RECOMMENDED DIET
SUGGEST A REGULAR TEXTURE DIET WITH THIN LIQUID CONSISTENCIES AS PATIENT TOLERATED THESE FOOD TEXTURES FROM AN OROPHARYNGEAL SWALLOWING PERSPECTIVE ON CLINICAL EXAM.

## 2022-04-26 NOTE — DISCHARGE NOTE NURSING/CASE MANAGEMENT/SOCIAL WORK - NSDCPEFALRISK_GEN_ALL_CORE
For information on Fall & Injury Prevention, visit: [email or web address] OR  [email or web address] OR  https://www.cdc.gov/steadi/patient.html

## 2022-04-26 NOTE — DISCHARGE NOTE NURSING/CASE MANAGEMENT/SOCIAL WORK - NSSCCONTNUM_GEN_ALL_CORE
Alprazolam 1mg      Last Written Prescription Date:  1/13/17  Last Fill Quantity: 20,   # refills: 5  Last Office Visit with [facility], [facility] or [facility] prescribing provider: 11/2/16  Future Office visit:       Routing refill request to provider for review/approval because:  Drug not on the [facility], [facility] or [facility] refill protocol or controlled substance      Kimber Anglin CPhT  [id number] [id number] [place] [address]  Phone: [phone]  Fax: [phone]     [phone]

## 2022-04-26 NOTE — SWALLOW BEDSIDE ASSESSMENT ADULT - SWALLOW EVAL: DIAGNOSIS
1) Pt exhibits functional Oropharyngeal Swallowing abilities for age without behavioral aspiration signs. Odynophagia denied. No change in O2 sats noted on exam.  2) The pt was alert and interactive. He was distractible at times. Pt was able to verbalize during communicative probes without evidence of a primary motor speech or primary linguistic pathology. Of note is that his utterances sometimes reflected reduced orientation/memory/insight consistent with baseline chronic Cognitive Dysfunction associated with Dementia. Though, it is noted that he was often able to verbalize needs and feels he is at communicative baseline.

## 2022-04-26 NOTE — SWALLOW BEDSIDE ASSESSMENT ADULT - SWALLOW EVAL: CRITERIA FOR SKILLED INTERVENTION MET
DO NOT FEEL THAT ACUTE SPEECH PATHOLOGY FOLLOW UP WOULD CHANGE CLINICAL MANAGEMENT/OUTCOME WHILE IN HOSPITAL SETTING. PT'S OROPHARYNGEAL SWALLOWING ABILITIES APPEARED TO BE FUNCTIONAL FOR AGE, NO PRIMARY SPEECH-LANGUAGE PATHOLOGY EVIDENT ON EXAM AND THE COGNITIVE DYSFUNCTION THAT IS NOTED IS CHRONIC/PRE-EXISTING. PT AT SUSPECTED COMMUNICATIVE/SWALLOWING BASELINE. GIVEN ABOVE, THIS SERVICE WILL NOT ACTIVELY FOLLOW. RECONSULT PRN SHOULD STATUS CHANGE AND CONDITION WARRANT.

## 2022-04-26 NOTE — PHYSICAL THERAPY INITIAL EVALUATION ADULT - PERTINENT HX OF CURRENT PROBLEM, REHAB EVAL
89 y/o male with PMHX of moderate dementia (oriented to person +/- place not date at baseline), anxiety/ depression, heart murmur and OA who presented to  with CC of fever and cough from home.  CXR/ CT of the chest with bilateral multifocal PNA

## 2022-04-27 LAB
ANION GAP SERPL CALC-SCNC: 5 MMOL/L — SIGNIFICANT CHANGE UP (ref 5–17)
BUN SERPL-MCNC: 14 MG/DL — SIGNIFICANT CHANGE UP (ref 7–23)
CALCIUM SERPL-MCNC: 8.8 MG/DL — SIGNIFICANT CHANGE UP (ref 8.5–10.1)
CHLORIDE SERPL-SCNC: 114 MMOL/L — HIGH (ref 96–108)
CO2 SERPL-SCNC: 26 MMOL/L — SIGNIFICANT CHANGE UP (ref 22–31)
CREAT SERPL-MCNC: 1.18 MG/DL — SIGNIFICANT CHANGE UP (ref 0.5–1.3)
EGFR: 59 ML/MIN/1.73M2 — LOW
GLUCOSE SERPL-MCNC: 95 MG/DL — SIGNIFICANT CHANGE UP (ref 70–99)
HCT VFR BLD CALC: 33.6 % — LOW (ref 39–50)
HGB BLD-MCNC: 11 G/DL — LOW (ref 13–17)
MCHC RBC-ENTMCNC: 32.4 PG — SIGNIFICANT CHANGE UP (ref 27–34)
MCHC RBC-ENTMCNC: 32.7 GM/DL — SIGNIFICANT CHANGE UP (ref 32–36)
MCV RBC AUTO: 99.1 FL — SIGNIFICANT CHANGE UP (ref 80–100)
PLATELET # BLD AUTO: 139 K/UL — LOW (ref 150–400)
POTASSIUM SERPL-MCNC: 3.7 MMOL/L — SIGNIFICANT CHANGE UP (ref 3.5–5.3)
POTASSIUM SERPL-SCNC: 3.7 MMOL/L — SIGNIFICANT CHANGE UP (ref 3.5–5.3)
RBC # BLD: 3.39 M/UL — LOW (ref 4.2–5.8)
RBC # FLD: 12.8 % — SIGNIFICANT CHANGE UP (ref 10.3–14.5)
SODIUM SERPL-SCNC: 145 MMOL/L — SIGNIFICANT CHANGE UP (ref 135–145)
WBC # BLD: 6.96 K/UL — SIGNIFICANT CHANGE UP (ref 3.8–10.5)
WBC # FLD AUTO: 6.96 K/UL — SIGNIFICANT CHANGE UP (ref 3.8–10.5)

## 2022-04-27 PROCEDURE — 12345: CPT | Mod: NC

## 2022-04-27 RX ADMIN — AZITHROMYCIN 255 MILLIGRAM(S): 500 TABLET, FILM COATED ORAL at 17:24

## 2022-04-27 RX ADMIN — Medication 1 MILLIGRAM(S): at 03:02

## 2022-04-27 RX ADMIN — MIRTAZAPINE 15 MILLIGRAM(S): 45 TABLET, ORALLY DISINTEGRATING ORAL at 21:19

## 2022-04-27 RX ADMIN — PIPERACILLIN AND TAZOBACTAM 25 GRAM(S): 4; .5 INJECTION, POWDER, LYOPHILIZED, FOR SOLUTION INTRAVENOUS at 13:30

## 2022-04-27 RX ADMIN — SERTRALINE 25 MILLIGRAM(S): 25 TABLET, FILM COATED ORAL at 13:30

## 2022-04-27 RX ADMIN — PIPERACILLIN AND TAZOBACTAM 25 GRAM(S): 4; .5 INJECTION, POWDER, LYOPHILIZED, FOR SOLUTION INTRAVENOUS at 05:31

## 2022-04-27 RX ADMIN — Medication 0.5 MILLIGRAM(S): at 13:29

## 2022-04-27 RX ADMIN — HEPARIN SODIUM 5000 UNIT(S): 5000 INJECTION INTRAVENOUS; SUBCUTANEOUS at 21:18

## 2022-04-27 RX ADMIN — Medication 0.5 MILLIGRAM(S): at 21:18

## 2022-04-27 RX ADMIN — PIPERACILLIN AND TAZOBACTAM 25 GRAM(S): 4; .5 INJECTION, POWDER, LYOPHILIZED, FOR SOLUTION INTRAVENOUS at 21:18

## 2022-04-27 RX ADMIN — Medication 3 MILLIGRAM(S): at 21:19

## 2022-04-27 RX ADMIN — HEPARIN SODIUM 5000 UNIT(S): 5000 INJECTION INTRAVENOUS; SUBCUTANEOUS at 10:36

## 2022-04-27 RX ADMIN — Medication 1200 MILLIGRAM(S): at 21:19

## 2022-04-27 RX ADMIN — GABAPENTIN 600 MILLIGRAM(S): 400 CAPSULE ORAL at 21:19

## 2022-04-27 NOTE — PROGRESS NOTE ADULT - ASSESSMENT
89 y/o male with PMHX of moderate dementia (oriented to person +/- place not date at baseline), anxiety/ depression, heart murmur and OA who presented to  with CC of fever and cough from home.  CXR/ CT of the chest with bilateral multifocal PNA.      #Acute hypoxic respiratory failure secondary to Sepsis from Multifocal PNA:    F/u pancultures.    Concern for possible aspiration with bilateral infiltrates and R>>L.  Daughter notes may have some issues with swallowing.    No concern for HCAP-- lives at home and no recent hospitalizations.    cont w/ zosyn to treat aspiration PNA and azithro to cover atypicals.    afebrile   leukocytosis resolved   hypoxia resolved   Speech and swallow eval. regular consistency and thin liquids      #Weakness:    Secondary to infection.    PT eval.      #Dementia/ Depression:    Hx of sundowning at night.    Cont remeron/ zoloft.    Seroquel PRN.         #AMA versus CKD:    Cr -- 1.13  Suspect near baseline.  Trend labs.      #Advanced Directives/ Goals of Care:  FULL CODE  contacted daughter and updated on POC

## 2022-04-28 PROCEDURE — 73030 X-RAY EXAM OF SHOULDER: CPT | Mod: 26,RT

## 2022-04-28 PROCEDURE — 99232 SBSQ HOSP IP/OBS MODERATE 35: CPT

## 2022-04-28 RX ORDER — CEFTRIAXONE 500 MG/1
1000 INJECTION, POWDER, FOR SOLUTION INTRAMUSCULAR; INTRAVENOUS EVERY 24 HOURS
Refills: 0 | Status: DISCONTINUED | OUTPATIENT
Start: 2022-04-28 | End: 2022-04-28

## 2022-04-28 RX ORDER — CEFTRIAXONE 500 MG/1
1000 INJECTION, POWDER, FOR SOLUTION INTRAMUSCULAR; INTRAVENOUS EVERY 24 HOURS
Refills: 0 | Status: DISCONTINUED | OUTPATIENT
Start: 2022-04-28 | End: 2022-04-29

## 2022-04-28 RX ADMIN — Medication 1200 MILLIGRAM(S): at 10:51

## 2022-04-28 RX ADMIN — Medication 0.5 MILLIGRAM(S): at 21:08

## 2022-04-28 RX ADMIN — HEPARIN SODIUM 5000 UNIT(S): 5000 INJECTION INTRAVENOUS; SUBCUTANEOUS at 10:50

## 2022-04-28 RX ADMIN — SERTRALINE 25 MILLIGRAM(S): 25 TABLET, FILM COATED ORAL at 10:51

## 2022-04-28 RX ADMIN — QUETIAPINE FUMARATE 12.5 MILLIGRAM(S): 200 TABLET, FILM COATED ORAL at 01:46

## 2022-04-28 RX ADMIN — CEFTRIAXONE 100 MILLIGRAM(S): 500 INJECTION, POWDER, FOR SOLUTION INTRAMUSCULAR; INTRAVENOUS at 12:23

## 2022-04-28 RX ADMIN — Medication 0.5 MILLIGRAM(S): at 12:20

## 2022-04-28 RX ADMIN — Medication 1200 MILLIGRAM(S): at 21:08

## 2022-04-28 RX ADMIN — AZITHROMYCIN 255 MILLIGRAM(S): 500 TABLET, FILM COATED ORAL at 17:21

## 2022-04-28 RX ADMIN — PIPERACILLIN AND TAZOBACTAM 25 GRAM(S): 4; .5 INJECTION, POWDER, LYOPHILIZED, FOR SOLUTION INTRAVENOUS at 05:16

## 2022-04-28 RX ADMIN — MIRTAZAPINE 15 MILLIGRAM(S): 45 TABLET, ORALLY DISINTEGRATING ORAL at 21:08

## 2022-04-28 RX ADMIN — HEPARIN SODIUM 5000 UNIT(S): 5000 INJECTION INTRAVENOUS; SUBCUTANEOUS at 21:08

## 2022-04-28 RX ADMIN — GABAPENTIN 600 MILLIGRAM(S): 400 CAPSULE ORAL at 21:21

## 2022-04-28 NOTE — PHARMACOTHERAPY INTERVENTION NOTE - COMMENTS
Recommended de-escalating therapy from Zosyn to Ceftriaxone.
Medication history complete, reviewed medication with patients daughter Brenda at [phone] and confirmed with [facility].

## 2022-04-28 NOTE — PROGRESS NOTE ADULT - SUBJECTIVE AND OBJECTIVE BOX
89 y/o male with PMHX of moderate dementia (oriented to person +/- place not date at baseline), anxiety/ depression, heart murmur and OA who presented to  with CC of fever and cough from home.  History is obtained from daughter at bedside.  HCP is wife.  Daughter notes her dad lives at home with wife and son.  Wife and son take care of him during the day and they have an aide who helps them at night due to sundowning/ confusion.  He is normally able to ambulate with help with a walker.  She notes her father was in his usual state of health up until this morning.  When we got up, his wife noted he had a fever to 103.  He c/o of a HA.  Family also noted a cough.  They called 911 brought him to the ER.  In the ER, patient febrile to 101.7.  CXR/ CT of the chest with bilateral multifocal PNA.  Patient was pancultred and started on IV ABX.  No recent hospitalizations in last 6 months.  Lives at home.  No sick contacts.  COVID/ RVP negative.  Patient given vanco/ cefepime by the ER.  Daughter notes her dad does sometimes choke/ cough after eating.  She is not sure if he could be aspirating.         22: Patient seen and examined. Lying in bed without any distress.         Vital Signs Last 24 Hrs  T(C): 36.6 (2022 09:00), Max: 36.7 (2022 15:16)  T(F): 97.9 (2022 09:00), Max: 98.1 (2022 15:16)  HR: 58 (2022 09:00) (58 - 79)  BP: 130/63 (2022 09:00) (120/52 - 130/64)  BP(mean): 83 (2022 15:16) (83 - 83)  RR: 18 (2022 09:00) (17 - 22)  SpO2: 96% (2022 09:00) (95% - 99%)        Physical Exam:       General:   elderly male.  NAD.    Skin: no rash or prominent lesions  Head: normocephalic, atraumatic     Sinuses: non-tender  Nose: no external lesions, mucosa non-inflamed, septum and turbinates normal  Throat: no erythema, exudates or lesions.  Neck: Supple without lymphadenopathy. Thyroid no thyromegaly, no palpable thyroid nodules, no palpable nodules or masses, carotid arteries no bruits.   Breasts: No palpable masses or lesions.  Heart: +murmur II/VI.  RRR.    Lungs: crackles at bases  Chest wall: Normal insp   Abdomen:  Soft, NT/ND, normal bowel sounds, no HSM, no masses.  No peritoneal signs.   Back: spine normal without deformity or tenderness.  Normal ROM   : Exam normal.  no inguinal hernias.  Extremities: No deformities, clubbing, cyanosis, or edema.          Labs:                             12.0   7.29  )-----------( 134      ( 2022 08:36 )             36.4     2022 08:36    143    |  114    |  19     ----------------------------<  94     3.9     |  26     |  1.21     Ca    8.8        2022 08:36    TPro  7.2    /  Alb  3.5    /  TBili  1.0    /  DBili  x      /  AST  20     /  ALT  19     /  AlkPhos  61     2022 10:54    LIVER FUNCTIONS - ( 2022 10:54 )  Alb: 3.5 g/dL / Pro: 7.2 gm/dL / ALK PHOS: 61 U/L / ALT: 19 U/L / AST: 20 U/L / GGT: x           PT/INR - ( 2022 10:54 )   PT: 13.6 sec;   INR: 1.17 ratio         PTT - ( 2022 10:54 )  PTT:29.1 sec  CAPILLARY BLOOD GLUCOSE            Urinalysis Basic - ( 2022 12:12 )    Color: Yellow / Appearance: Clear / S.010 / pH: x  Gluc: x / Ketone: Negative  / Bili: Negative / Urobili: Negative   Blood: x / Protein: Negative / Nitrite: Negative   Leuk Esterase: Negative / RBC: x / WBC x   Sq Epi: x / Non Sq Epi: x / Bacteria: x            MEDICATIONS  (STANDING):  azithromycin  IVPB      azithromycin  IVPB 500 milliGRAM(s) IV Intermittent every 24 hours  gabapentin 600 milliGRAM(s) Oral at bedtime  guaiFENesin ER 1200 milliGRAM(s) Oral every 12 hours  heparin   Injectable 5000 Unit(s) SubCutaneous every 12 hours  mirtazapine 15 milliGRAM(s) Oral at bedtime  piperacillin/tazobactam IVPB.. 3.375 Gram(s) IV Intermittent every 8 hours  sertraline 25 milliGRAM(s) Oral daily    MEDICATIONS  (PRN):  acetaminophen     Tablet .. 650 milliGRAM(s) Oral every 6 hours PRN Temp greater or equal to 38C (100.4F), Mild Pain (1 - 3)  aluminum hydroxide/magnesium hydroxide/simethicone Suspension 30 milliLiter(s) Oral every 4 hours PRN Dyspepsia  melatonin 3 milliGRAM(s) Oral at bedtime PRN Insomnia  ondansetron Injectable 4 milliGRAM(s) IV Push every 8 hours PRN Nausea and/or Vomiting  QUEtiapine 12.5 milliGRAM(s) Oral at bedtime PRN agitation              Assessment and Plan:   Assessment:  · Assessment	  89 y/o male with PMHX of moderate dementia (oriented to person +/- place not date at baseline), anxiety/ depression, heart murmur and OA who presented to  with CC of fever and cough from home.  CXR/ CT of the chest with bilateral multifocal PNA.      #Acute hypoxic respiratory failure secondary to Sepsis from Multifocal PNA:    F/u pancultures.    Concern for possible aspiration with bilateral infiltrates and R>>L.  Daughter notes may have some issues with swallowing.    Tx with zosyn to treat aspiration PNA and azithro to cover atypicals.    Follow oxygenation-- presently on 2L.    Speech and swallow eval.    Soft and bite sized diet for now.    Aspiration precautions    #Weakness:    Secondary to infection.    PT eval.      #Dementia/ Depression:    Hx of sundowning at night.    Cont remeron/ zoloft.    Seroquel PRN.      #AMA versus CKD:    Cr 1.3.  Suspect near baseline.  Trend labs.      #Advanced Directives/ Goals of Care:  FULL CODE  Admitting physician discussed with family  
Patient is a 89y old  Male who presents with a chief complaint of fever, cough, weakness (26 Apr 2022 14:00)      SUBJECTIVE:   HPI:  89 y/o male with PMHX of moderate dementia (oriented to person +/- place not date at baseline), anxiety/ depression, heart murmur and OA who presented to  with CC of fever and cough from home.  History is obtained from daughter at bedside.  HCP is wife.  Daughter notes her dad lives at home with wife and son.  Wife and son take care of him during the day and they have an aide who helps them at night due to sundowning/ confusion.  He is normally able to ambulate with help with a walker.  She notes her father was in his usual state of health up until this morning.  When we got up, his wife noted he had a fever to 103.  He c/o of a HA.  Family also noted a cough.  They called 911 brought him to the ER.  In the ER, patient febrile to 101.7.  CXR/ CT of the chest with bilateral multifocal PNA.  Patient was pancultred and started on IV ABX.  No recent hospitalizations in last 6 months.  Lives at home.  No sick contacts.  COVID/ RVP negative.  Patient given vanco/ cefepime by the ER.  Daughter notes her dad does sometimes choke/ cough after eating.  She is not sure if he could be aspirating.      4/27: seen and examined laying in bed, confused but no complaints when questioned. appearing comfortable at rest      REVIEW OF SYSTEMS: limited due to dementia     Vital Signs Last 24 Hrs  T(C): 36.4 (27 Apr 2022 08:18), Max: 36.9 (26 Apr 2022 15:58)  T(F): 97.5 (27 Apr 2022 08:18), Max: 98.4 (26 Apr 2022 15:58)  HR: 56 (27 Apr 2022 08:18) (56 - 67)  BP: 142/65 (27 Apr 2022 08:18) (142/65 - 150/65)  BP(mean): 76 (26 Apr 2022 15:58) (76 - 76)  RR: 18 (27 Apr 2022 08:18) (18 - 18)  SpO2: 95% (27 Apr 2022 08:18) (95% - 98%)      PHYSICAL EXAM:    Constitutional: NAD, awake and alert, well-developed  HEENT: PERR, EOMI, Normal Hearing, MMM  Neck: Soft and supple, No LAD, No JVD  Respiratory: Breath sounds are clear bilaterally, No wheezing, rales or rhonchi  Cardiovascular: S1 and S2, regular rate and rhythm, no Murmurs, gallops or rubs  Gastrointestinal: Bowel Sounds present, soft, nontender, nondistended, no guarding, no rebound  Extremities: No peripheral edema  Vascular: 2+ peripheral pulses  Neurological: A/O x1, no focal deficits  Musculoskeletal: 5/5 strength b/l upper and lower extremities  Skin: No rashes    MEDICATIONS:  MEDICATIONS  (STANDING):  azithromycin  IVPB      azithromycin  IVPB 500 milliGRAM(s) IV Intermittent every 24 hours  gabapentin 600 milliGRAM(s) Oral at bedtime  guaiFENesin ER 1200 milliGRAM(s) Oral every 12 hours  heparin   Injectable 5000 Unit(s) SubCutaneous every 12 hours  mirtazapine 15 milliGRAM(s) Oral at bedtime  piperacillin/tazobactam IVPB.. 3.375 Gram(s) IV Intermittent every 8 hours  sertraline 25 milliGRAM(s) Oral daily      LABS: All Labs Reviewed:                        11.0   6.96  )-----------( 139      ( 27 Apr 2022 08:10 )             33.6     04-27    145  |  114<H>  |  14  ----------------------------<  95  3.7   |  26  |  1.18    Ca    8.8      27 Apr 2022 08:10        RADIOLOGY/EKG:    < from: CT Chest No Cont (04.25.22 @ 11:33) >    IMPRESSION:  Findings likely represent multifocal pneumonia. Follow-up to resolution   recommended.    --- End of Report ---      TOREY ZHAO MD; Attending Radiologist  This document has been electronically signed. Apr 25 2022 12:26PM    < end of copied text >        
Patient is a 89y old  Male who presents with a chief complaint of fever, cough, weakness (26 Apr 2022 14:00)      SUBJECTIVE:   HPI:  89 y/o male with PMHX of moderate dementia (oriented to person +/- place not date at baseline), anxiety/ depression, heart murmur and OA who presented to  with CC of fever and cough from home.  History is obtained from daughter at bedside.  HCP is wife.  Daughter notes her dad lives at home with wife and son.  Wife and son take care of him during the day and they have an aide who helps them at night due to sundowning/ confusion.  He is normally able to ambulate with help with a walker.  She notes her father was in his usual state of health up until this morning.  When we got up, his wife noted he had a fever to 103.  He c/o of a HA.  Family also noted a cough.  They called 911 brought him to the ER.  In the ER, patient febrile to 101.7.  CXR/ CT of the chest with bilateral multifocal PNA.  Patient was pancultred and started on IV ABX.  No recent hospitalizations in last 6 months.  Lives at home.  No sick contacts.  COVID/ RVP negative.  Patient given vanco/ cefepime by the ER.  Daughter notes her dad does sometimes choke/ cough after eating.  She is not sure if he could be aspirating.      4/27: seen and examined laying in bed, confused but no complaints when questioned. appearing comfortable at rest  4/28: sitting up in chair, no complaints, no fever or cough.     REVIEW OF SYSTEMS: limited due to dementia     Vital Signs Last 24 Hrs  T(C): 36.4 (27 Apr 2022 08:18), Max: 36.9 (26 Apr 2022 15:58)  T(F): 97.5 (27 Apr 2022 08:18), Max: 98.4 (26 Apr 2022 15:58)  HR: 56 (27 Apr 2022 08:18) (56 - 67)  BP: 142/65 (27 Apr 2022 08:18) (142/65 - 150/65)  BP(mean): 76 (26 Apr 2022 15:58) (76 - 76)  RR: 18 (27 Apr 2022 08:18) (18 - 18)  SpO2: 95% (27 Apr 2022 08:18) (95% - 98%)      PHYSICAL EXAM:    Constitutional: NAD, awake and alert, well-developed  HEENT: PERR, EOMI, Normal Hearing, MMM  Neck: Soft and supple, No LAD, No JVD  Respiratory: Breath sounds are clear bilaterally, No wheezing, rales or rhonchi  Cardiovascular: S1 and S2, regular rate and rhythm, no Murmurs, gallops or rubs  Gastrointestinal: Bowel Sounds present, soft, nontender, nondistended, no guarding, no rebound  Extremities: No peripheral edema  Vascular: 2+ peripheral pulses  Neurological: A/O x1, no focal deficits  Musculoskeletal: 5/5 strength b/l upper and lower extremities  Skin: No rashes    MEDICATIONS:  MEDICATIONS  (STANDING):  azithromycin  IVPB      azithromycin  IVPB 500 milliGRAM(s) IV Intermittent every 24 hours  gabapentin 600 milliGRAM(s) Oral at bedtime  guaiFENesin ER 1200 milliGRAM(s) Oral every 12 hours  heparin   Injectable 5000 Unit(s) SubCutaneous every 12 hours  mirtazapine 15 milliGRAM(s) Oral at bedtime  piperacillin/tazobactam IVPB.. 3.375 Gram(s) IV Intermittent every 8 hours  sertraline 25 milliGRAM(s) Oral daily      LABS: All Labs Reviewed:                        11.0   6.96  )-----------( 139      ( 27 Apr 2022 08:10 )             33.6     04-27    145  |  114<H>  |  14  ----------------------------<  95  3.7   |  26  |  1.18    Ca    8.8      27 Apr 2022 08:10        RADIOLOGY/EKG:    < from: CT Chest No Cont (04.25.22 @ 11:33) >    IMPRESSION:  Findings likely represent multifocal pneumonia. Follow-up to resolution   recommended.    --- End of Report ---      TOREY ZHAO MD; Attending Radiologist  This document has been electronically signed. Apr 25 2022 12:26PM    < end of copied text >

## 2022-04-28 NOTE — CHART NOTE - NSCHARTNOTEFT_GEN_A_CORE
I was notified by RN staff at 19:20 on 4/28/2022 that when the patient was brought to bathroom by staff, the patient fell forward off toilet bowel onto his left shoulder.     T(C): 36.3 (04-28-22 @ 16:04), Max: 36.8 (04-28-22 @ 07:50)  HR: 81 (04-28-22 @ 19:19) (58 - 81)  BP: 148/76 (04-28-22 @ 19:19) (148/76 - 164/69)  RR: 16 (04-28-22 @ 19:19) (16 - 18)  SpO2: 100% (04-28-22 @ 19:19) (99% - 100%)    CONSTITUTIONAL: Well groomed, no apparent distress  EYES: PERRLA and symmetric, EOMI, No conjunctival or scleral injection, non-icteric  ENMT: Oral mucosa with moist membranes. No external nasal lesions; nasal mucosa not inflamed; normal dentition; no pharyngeal injection or exudates. Otoscopic exam with normal tympanic membranes; no gross hearing impairment noted.  NECK: Supple, symmetric and without tracheal deviation; thyroid gland not enlarged and without palpable masses  CARDIOVASCULAR: RRRR, +S1S2, no murmurs, no rubs, no gallops; no JVD; no peripheral edema  Vascular: no carotid bruits; no abdominal bruit; carotid pulse palpable, radial pulse palpable, femoral pulse palpable, dorsalis pedis pulse palpable, posterior tibialis pulse palpable  MUSCULOSKELETAL: Normal gait and station; no digital clubbing or cyanosis; examination of the (head/neck, spine/ribs/pelvis, RUE, LUE, RLE, LLE) without misalignment, normal range of motion without pain, no spinal tenderness, normal muscle strength/tone, patient is moving left shoulder and states he is in no pain  SKIN: No rashes or ulcers noted; no subcutaneous nodules or induration palpable  NEUROLOGIC: CN II-XII intact; normal reflexes in upper and lower extremities, sensation intact in upper and lower extremities b/l to light touch; Babinski down b/l; no Kernig’s sign, no Brudzinski’s sign. No head trauma noted.    ~XR Right shoulder ordered   ~will follow

## 2022-04-28 NOTE — CHART NOTE - NSCHARTNOTEFT_GEN_A_CORE
Called by floor RN Manju, made aware about a mechanical fall onto left shoulder.  Reached out to wife to make her aware of fall.  I discussed at length that pt fell onto left side but doing okay.  I told her that the covering night doctor will assess patient.  I also discussed at length patients overall medical plan, treatment of pneumonia, type of pneumonia that we are treating and overall improvement since admission.  I answered all of her questions to the best of my ability.  Length of conversation > 25min.

## 2022-04-28 NOTE — PROVIDER CONTACT NOTE (FALL NOTIFICATION) - ASSESSMENT
bleeding mole on right side of his back, no bumps noted on head, patient is moving left shoulder and states he is in no pain

## 2022-04-28 NOTE — PROGRESS NOTE ADULT - ASSESSMENT
89 y/o male with PMHX of moderate dementia (oriented to person +/- place not date at baseline), anxiety/ depression, heart murmur and OA who presented to  with CC of fever and cough from home.  CXR/ CT of the chest with bilateral multifocal PNA.      #Acute hypoxic respiratory failure secondary to Sepsis from Multifocal PNA:    F/u pancultures.    Concern for possible aspiration with bilateral infiltrates and R>>L.  Daughter notes may have some issues with swallowing.    No concern for HCAP-- lives at home and no recent hospitalizations.    s/p zosyn, switched to Ceftriaxone, still on azithro   afebrile   leukocytosis resolved   hypoxia resolved   Speech and swallow eval. regular consistency and thin liquids      #Weakness:    Secondary to infection.    PT eval.      #Dementia/ Depression:    Hx of sundowning at night.    Cont remeron/ zoloft.    Seroquel PRN.         #AMA versus CKD:    Cr -- 1.18   Trend labs.      #Advanced Directives/ Goals of Care:  FULL CODE  contacted daughter and updated on POC

## 2022-04-29 ENCOUNTER — TRANSCRIPTION ENCOUNTER (OUTPATIENT)
Age: 87
End: 2022-04-29

## 2022-04-29 VITALS
HEART RATE: 50 BPM | SYSTOLIC BLOOD PRESSURE: 145 MMHG | TEMPERATURE: 98 F | RESPIRATION RATE: 18 BRPM | DIASTOLIC BLOOD PRESSURE: 55 MMHG | OXYGEN SATURATION: 100 %

## 2022-04-29 LAB
ANION GAP SERPL CALC-SCNC: 4 MMOL/L — LOW (ref 5–17)
BUN SERPL-MCNC: 18 MG/DL — SIGNIFICANT CHANGE UP (ref 7–23)
CALCIUM SERPL-MCNC: 8.8 MG/DL — SIGNIFICANT CHANGE UP (ref 8.5–10.1)
CHLORIDE SERPL-SCNC: 114 MMOL/L — HIGH (ref 96–108)
CO2 SERPL-SCNC: 25 MMOL/L — SIGNIFICANT CHANGE UP (ref 22–31)
CREAT SERPL-MCNC: 1.07 MG/DL — SIGNIFICANT CHANGE UP (ref 0.5–1.3)
EGFR: 66 ML/MIN/1.73M2 — SIGNIFICANT CHANGE UP
GLUCOSE SERPL-MCNC: 95 MG/DL — SIGNIFICANT CHANGE UP (ref 70–99)
HCT VFR BLD CALC: 34.9 % — LOW (ref 39–50)
HGB BLD-MCNC: 11.7 G/DL — LOW (ref 13–17)
MCHC RBC-ENTMCNC: 32.8 PG — SIGNIFICANT CHANGE UP (ref 27–34)
MCHC RBC-ENTMCNC: 33.5 GM/DL — SIGNIFICANT CHANGE UP (ref 32–36)
MCV RBC AUTO: 97.8 FL — SIGNIFICANT CHANGE UP (ref 80–100)
PLATELET # BLD AUTO: 156 K/UL — SIGNIFICANT CHANGE UP (ref 150–400)
POTASSIUM SERPL-MCNC: 3.8 MMOL/L — SIGNIFICANT CHANGE UP (ref 3.5–5.3)
POTASSIUM SERPL-SCNC: 3.8 MMOL/L — SIGNIFICANT CHANGE UP (ref 3.5–5.3)
RBC # BLD: 3.57 M/UL — LOW (ref 4.2–5.8)
RBC # FLD: 12.8 % — SIGNIFICANT CHANGE UP (ref 10.3–14.5)
SODIUM SERPL-SCNC: 143 MMOL/L — SIGNIFICANT CHANGE UP (ref 135–145)
WBC # BLD: 4.95 K/UL — SIGNIFICANT CHANGE UP (ref 3.8–10.5)
WBC # FLD AUTO: 4.95 K/UL — SIGNIFICANT CHANGE UP (ref 3.8–10.5)

## 2022-04-29 PROCEDURE — 99239 HOSP IP/OBS DSCHRG MGMT >30: CPT

## 2022-04-29 RX ORDER — CEFUROXIME AXETIL 250 MG
1 TABLET ORAL
Qty: 4 | Refills: 0
Start: 2022-04-29 | End: 2022-04-30

## 2022-04-29 RX ADMIN — AZITHROMYCIN 255 MILLIGRAM(S): 500 TABLET, FILM COATED ORAL at 15:35

## 2022-04-29 RX ADMIN — SERTRALINE 25 MILLIGRAM(S): 25 TABLET, FILM COATED ORAL at 11:26

## 2022-04-29 RX ADMIN — QUETIAPINE FUMARATE 12.5 MILLIGRAM(S): 200 TABLET, FILM COATED ORAL at 00:24

## 2022-04-29 RX ADMIN — Medication 1200 MILLIGRAM(S): at 11:26

## 2022-04-29 RX ADMIN — Medication 3 MILLIGRAM(S): at 00:26

## 2022-04-29 RX ADMIN — CEFTRIAXONE 100 MILLIGRAM(S): 500 INJECTION, POWDER, FOR SOLUTION INTRAMUSCULAR; INTRAVENOUS at 11:27

## 2022-04-29 RX ADMIN — HEPARIN SODIUM 5000 UNIT(S): 5000 INJECTION INTRAVENOUS; SUBCUTANEOUS at 11:26

## 2022-04-29 NOTE — DISCHARGE NOTE PROVIDER - NSDCFUSCHEDAPPT_GEN_ALL_CORE_FT
Susanne Phillips Physician [facility]  [facility]iatric[facility] [address] [place] [address]   Scheduled Appointment: 05/05/2022

## 2022-04-29 NOTE — DISCHARGE NOTE PROVIDER - CARE PROVIDER_API CALL
Tashi Akbar (DO)  Medicine  [facility]  [address] [facility], [address]  [place] [address]  Phone: [phone]  Fax: [phone]  Follow Up Time:

## 2022-04-29 NOTE — DISCHARGE NOTE PROVIDER - NSDCHHHOMEBOUND_GEN_ALL_CORE
Requires supervison due to deteriorating mental status.../Chest pain/weakness during/after ambulation   greater than 20 feet/Fall risk

## 2022-04-29 NOTE — DISCHARGE NOTE PROVIDER - HOSPITAL COURSE
HPI:  89 y/o male with PMHX of moderate dementia (oriented to person +/- place not date at baseline), anxiety/ depression, heart murmur and OA who presented to  with CC of fever and cough from home.  History is obtained from daughter at bedside.  HCP is wife.  Daughter notes her dad lives at home with wife and son.  Wife and son take care of him during the day and they have an aide who helps them at night due to sundowning/ confusion.  He is normally able to ambulate with help with a walker.  She notes her father was in his usual state of health up until this morning.  When we got up, his wife noted he had a fever to 103.  He c/o of a HA.  Family also noted a cough.  They called 911 brought him to the ER.  In the ER, patient febrile to 101.7.  CXR/ CT of the chest with bilateral multifocal PNA.  Patient was pancultred and started on IV ABX.  No recent hospitalizations in last 6 months.  Lives at home.  No sick contacts.  COVID/ RVP negative.  Patient given vanco/ cefepime by the ER.  Daughter notes her dad does sometimes choke/ cough after eating.  She is not sure if he could be aspirating.      hospital course: pt admitted for hypoxia and sepsis due to mutlifocal PNA. given rocephin and azithromycin x 4 days, will complete a 7 day course. course complicated by sundowning (known complication has live in aides for this reason) leading to fall while using bathroom. no trauma or injury resulted from fall. hypoxia self limited. course otherwise unremarkable. pt evaled by SLP- regular diet and reg consistency fluids.     All 10 systems reviewed and found to be negative with the exception of what has been described above.    Vital Signs Last 24 Hrs  T(C): 36.5 (29 Apr 2022 09:18), Max: 36.8 (29 Apr 2022 00:02)  T(F): 97.7 (29 Apr 2022 09:18), Max: 98.2 (29 Apr 2022 00:02)  HR: 51 (29 Apr 2022 09:18) (51 - 81)  BP: 149/62 (29 Apr 2022 09:18) (148/76 - 163/65)  BP(mean): --  RR: 18 (29 Apr 2022 09:18) (16 - 18)  SpO2: 98% (29 Apr 2022 09:18) (98% - 100%)    PHYSICAL EXAM:    Constitutional: NAD, awake and alert, well-developed  HEENT: PERR, EOMI, Normal Hearing, MMM  Neck: Soft and supple, No LAD, No JVD  Respiratory: Breath sounds are clear bilaterally, No wheezing, rales or rhonchi  Cardiovascular: S1 and S2, regular rate and rhythm, no Murmurs, gallops or rubs  Gastrointestinal: Bowel Sounds present, soft, nontender, nondistended, no guarding, no rebound  Extremities: No peripheral edema  Vascular: 2+ peripheral pulses  Neurological: A/O x1, no focal deficits  Musculoskeletal: 5/5 strength b/l upper and lower extremities  Skin: No rashes      RADIOLOGY/EKG:    < from: CT Chest No Cont (04.25.22 @ 11:33) >    IMPRESSION:  Findings likely represent multifocal pneumonia. Follow-up to resolution   recommended.    --- End of Report ---      TOREY ZHAO MD; Attending Radiologist  This document has been electronically signed. Apr 25 2022 12:26PM    < end of copied text >    #Acute hypoxic respiratory failure secondary to Sepsis from Multifocal PNA:    negative pancultures.    Concern for possible aspiration with bilateral infiltrates and R>>L.  Daughter notes may have some issues with swallowing.    No concern for HCAP-- lives at home and no recent hospitalizations.    s/p zosyn, switched to Ceftriaxone x 3 days (complete 2 more days rx sent) & completed zithro x 5 days   afebrile   leukocytosis resolved   hypoxia resolved   Speech and swallow eval. regular consistency and thin liquids      #Weakness:  resolved   Secondary to infection.    PT eval.  home with assist and home pt     #Dementia/ Depression:    Hx of sundowning at night.    Cont remeron/ zoloft.    Seroquel PRN.       #AMA on CKD:    Cr -- 1.18       #Advanced Directives/ Goals of Care:  FULL CODE  contacted daughter and updated on POC   above plan discussed with pt, bedside RN and MD Brooks   spent __41__ mins preparing dc.      cc: fever, cough.  HPI:  89 y/o male with PMHX of moderate dementia (oriented to person +/- place not date at baseline), anxiety/ depression, heart murmur and OA who presented to  with CC of fever and cough from home.  History is obtained from daughter at bedside.  HCP is wife.  Daughter notes her dad lives at home with wife and son.  Wife and son take care of him during the day and they have an aide who helps them at night due to sundowning/ confusion.  He is normally able to ambulate with help with a walker.  She notes her father was in his usual state of health up until this morning.  When we got up, his wife noted he had a fever to 103.  He c/o of a HA.  Family also noted a cough.  They called 911 brought him to the ER.  In the ER, patient febrile to 101.7.  CXR/ CT of the chest with bilateral multifocal PNA.  Patient was pancultred and started on IV ABX.  No recent hospitalizations in last 6 months.  Lives at home.  No sick contacts.  COVID/ RVP negative.  Patient given vanco/ cefepime by the ER.  Daughter notes her dad does sometimes choke/ cough after eating.  She is not sure if he could be aspirating.      hospital course: pt admitted for hypoxia and sepsis due to mutlifocal PNA. given rocephin and azithromycin x 4 days, will complete a 7 day course. course complicated by sundowning (known complication has live in aides for this reason) leading to fall while using bathroom. no trauma or injury resulted from fall. hypoxia self limited. course otherwise unremarkable. pt evaled by SLP- regular diet and reg consistency fluids.     All 10 systems reviewed and found to be negative with the exception of what has been described above.    Vital Signs Last 24 Hrs  T(C): 36.5 (29 Apr 2022 09:18), Max: 36.8 (29 Apr 2022 00:02)  T(F): 97.7 (29 Apr 2022 09:18), Max: 98.2 (29 Apr 2022 00:02)  HR: 51 (29 Apr 2022 09:18) (51 - 81)  BP: 149/62 (29 Apr 2022 09:18) (148/76 - 163/65)  BP(mean): --  RR: 18 (29 Apr 2022 09:18) (16 - 18)  SpO2: 98% (29 Apr 2022 09:18) (98% - 100%)    PHYSICAL EXAM:    Constitutional: NAD, awake and alert, well-developed  HEENT: PERR, EOMI, Normal Hearing, MMM  Neck: Soft and supple, No LAD, No JVD  Respiratory: Breath sounds are clear bilaterally, No wheezing, rales or rhonchi  Cardiovascular: S1 and S2, regular rate and rhythm, no Murmurs, gallops or rubs  Gastrointestinal: Bowel Sounds present, soft, nontender, nondistended, no guarding, no rebound  Extremities: No peripheral edema  Vascular: 2+ peripheral pulses  Neurological: A/O x1, no focal deficits  Musculoskeletal: 5/5 strength b/l upper and lower extremities  Skin: No rashes      RADIOLOGY/EKG:    < from: CT Chest No Cont (04.25.22 @ 11:33) >    IMPRESSION:  Findings likely represent multifocal pneumonia. Follow-up to resolution   recommended.    --- End of Report ---      TOREY ZHAO MD; Attending Radiologist  This document has been electronically signed. Apr 25 2022 12:26PM    < end of copied text >    #Acute hypoxic respiratory failure secondary to Sepsis from Multifocal PNA:    negative pancultures.    Concern for possible aspiration with bilateral infiltrates and R>>L.  Daughter notes may have some issues with swallowing.    No concern for HCAP-- lives at home and no recent hospitalizations.    s/p zosyn, switched to Ceftriaxone x 3 days (complete 2 more days rx sent) & completed zithro x 5 days   afebrile   leukocytosis resolved   hypoxia resolved   Speech and swallow eval. regular consistency and thin liquids      #Weakness:  resolved   Secondary to infection.    PT eval.  home with assist and home pt     #Dementia/ Depression:    Hx of sundowning at night.    Cont remeron/ zoloft.    Seroquel PRN.       #AMA on CKD:    Cr -- 1.18       #Advanced Directives/ Goals of Care:  FULL CODE  contacted daughter and updated on POC   above plan discussed with pt, bedside RN and MD Brooks   spent __41__ mins preparing dc.     Attending Attestation:  Pt seen and examined with PAM Pang. I personally had a face to face encounter with the patient, examined the patient myself and reviewed the plan of care with the patient and said PAM Pang. I agree with the assessment and plan of NP Bird as stated and discussed.

## 2022-04-29 NOTE — DISCHARGE NOTE PROVIDER - NSDCCPCAREPLAN_GEN_ALL_CORE_FT
PRINCIPAL DISCHARGE DIAGNOSIS  Diagnosis: Multifocal pneumonia  Assessment and Plan of Treatment: You were found to have pneumonia which is an infection in one or both of the lungs. It causes the air sacs of the lungs to fill up with fluid or pus. It can range from mild to severe, depending on the type of germ causing the infection, your age, and your overall health. ~*~*~Continue to take _CEFTIN_ antibiotics for __2_ more days (sent to pharmacy). ~*~*~You also completed 5 days of IV antibiotics. Continue to stay hydrated. **Monitor for the following signs/symptoms: Fever > 101, chills, cough with phlegm, shortness of breath and nausea and/or vomiting. If you experience these signs/symptoms please alert your primary care provider or if your signs/symptoms are severe please return to the ED**

## 2022-04-29 NOTE — DISCHARGE NOTE PROVIDER - NSDCMRMEDTOKEN_GEN_ALL_CORE_FT
cefuroxime 500 mg oral tablet: 1 tab(s) orally 2 times a day   cyanocobalamin 500 mcg oral tablet: 1 tab(s) orally once a day  furosemide 20 mg oral tablet: 1 tab(s) orally once a day  gabapentin 300 mg oral capsule: 2 cap(s) orally once a day (at bedtime)  mirtazapine 15 mg oral tablet: 1 tab(s) orally once a day (at bedtime)  sertraline 25 mg oral tablet: 1 tab(s) orally once a day  Vitamin D3 25 mcg (1000 intl units) oral tablet: 1 tab(s) orally once a day

## 2022-04-29 NOTE — DISCHARGE NOTE PROVIDER - NSDCFUADDAPPT_GEN_ALL_CORE_FT
Susanne Phillips MD  [address] [place] [address], [address]  [place] [address]  [phone]  Thursaday May 5th @ 11:30

## 2022-04-30 LAB
CULTURE RESULTS: SIGNIFICANT CHANGE UP
CULTURE RESULTS: SIGNIFICANT CHANGE UP
SPECIMEN SOURCE: SIGNIFICANT CHANGE UP
SPECIMEN SOURCE: SIGNIFICANT CHANGE UP

## 2022-05-02 PROBLEM — F03.90 UNSPECIFIED DEMENTIA, UNSPECIFIED SEVERITY, WITHOUT BEHAVIORAL DISTURBANCE, PSYCHOTIC DISTURBANCE, MOOD DISTURBANCE, AND ANXIETY: Chronic | Status: ACTIVE | Noted: 2022-04-30

## 2022-05-02 PROBLEM — N40.0 BENIGN PROSTATIC HYPERPLASIA WITHOUT LOWER URINARY TRACT SYMPTOMS: Chronic | Status: ACTIVE | Noted: 2022-04-30

## 2022-05-02 PROBLEM — F03.90 UNSPECIFIED DEMENTIA WITHOUT BEHAVIORAL DISTURBANCE: Chronic | Status: ACTIVE | Noted: 2022-04-30

## 2022-05-02 PROBLEM — F32.A DEPRESSION, UNSPECIFIED: Chronic | Status: ACTIVE | Noted: 2022-04-30

## 2022-05-03 NOTE — CHART NOTE - NSCHARTNOTEFT_GEN_A_CORE
contacted by SW wife had concerns regarding fall and dispo concerns. contacted daughter and pt wife on conference. all questions answered to best of my ability.

## 2022-05-05 ENCOUNTER — APPOINTMENT (OUTPATIENT)
Dept: GERIATRICS | Facility: CLINIC | Age: 87
End: 2022-05-05

## 2022-05-05 DIAGNOSIS — A41.9 SEPSIS, UNSPECIFIED ORGANISM: ICD-10-CM

## 2022-05-05 DIAGNOSIS — I12.9 HYPERTENSIVE CHRONIC KIDNEY DISEASE WITH STAGE 1 THROUGH STAGE 4 CHRONIC KIDNEY DISEASE, OR UNSPECIFIED CHRONIC KIDNEY DISEASE: ICD-10-CM

## 2022-05-05 DIAGNOSIS — N18.9 CHRONIC KIDNEY DISEASE, UNSPECIFIED: ICD-10-CM

## 2022-05-05 DIAGNOSIS — M19.90 UNSPECIFIED OSTEOARTHRITIS, UNSPECIFIED SITE: ICD-10-CM

## 2022-05-05 DIAGNOSIS — F03.90 UNSPECIFIED DEMENTIA WITHOUT BEHAVIORAL DISTURBANCE: ICD-10-CM

## 2022-05-05 DIAGNOSIS — N17.9 ACUTE KIDNEY FAILURE, UNSPECIFIED: ICD-10-CM

## 2022-05-05 DIAGNOSIS — J96.01 ACUTE RESPIRATORY FAILURE WITH HYPOXIA: ICD-10-CM

## 2022-05-05 DIAGNOSIS — N40.0 BENIGN PROSTATIC HYPERPLASIA WITHOUT LOWER URINARY TRACT SYMPTOMS: ICD-10-CM

## 2022-05-05 DIAGNOSIS — F32.A DEPRESSION, UNSPECIFIED: ICD-10-CM

## 2022-05-05 DIAGNOSIS — Z91.018 ALLERGY TO OTHER FOODS: ICD-10-CM

## 2022-05-05 DIAGNOSIS — J18.9 PNEUMONIA, UNSPECIFIED ORGANISM: ICD-10-CM

## 2022-05-05 DIAGNOSIS — E78.5 HYPERLIPIDEMIA, UNSPECIFIED: ICD-10-CM

## 2022-05-09 ENCOUNTER — APPOINTMENT (OUTPATIENT)
Dept: CARE COORDINATION | Facility: HOME HEALTH | Age: 87
End: 2022-05-09
Payer: MEDICARE

## 2022-05-09 VITALS
RESPIRATION RATE: 16 BRPM | SYSTOLIC BLOOD PRESSURE: 124 MMHG | DIASTOLIC BLOOD PRESSURE: 60 MMHG | OXYGEN SATURATION: 98 % | HEART RATE: 60 BPM

## 2022-05-09 DIAGNOSIS — F03.90 UNSPECIFIED DEMENTIA W/OUT BEHAVIORAL DISTURBANCE: ICD-10-CM

## 2022-05-09 DIAGNOSIS — J18.9 PNEUMONIA, UNSPECIFIED ORGANISM: ICD-10-CM

## 2022-05-09 DIAGNOSIS — F32.A DEPRESSION, UNSPECIFIED: ICD-10-CM

## 2022-05-09 PROCEDURE — 99495 TRANSJ CARE MGMT MOD F2F 14D: CPT

## 2022-05-09 NOTE — REVIEW OF SYSTEMS
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

## 2022-05-09 NOTE — HISTORY OF PRESENT ILLNESS
[Post-hospitalization from ___ Hospital] : Post-hospitalization from [unfilled] Hospital [Admitted on: ___] : The patient was admitted on [unfilled] [Discharged on ___] : discharged on [unfilled] [FreeTextEntry2] : FROM [facility] NOTE PROVIDER:\par  Discharge Note Provider [Charted Location: HNT 5 [facility] [id number]] [Authored: 29-Apr-2022 14:42]- for Visit: [id number], Complete, Revised, Signed in Full, Available to Patient\par \par \par  Hospital Course:\par Discharge Date	29-Apr-2022\par Admission Date	25-Apr-2022 15:47\par Reason for Admission	fever, cough, weakness\par Hospital Course	\par cc: fever, cough.\par HPI:\par 89 y/o male with PMHX of moderate dementia (oriented to person +/- place not date at baseline), anxiety/ depression, heart murmur and OA who presented to  with CC of fever and cough from home.  History is obtained from daughter at bedside.  HCP is wife.  Daughter notes her dad lives at home with wife and son.  Wife and son take care of him during the day and they have an aide who helps them at night due to sundowning/ confusion.  He is normally able to ambulate with help with a walker.  She notes her father was in his usual state of health up until this morning.  When we got up, his wife noted he had a fever to 103.  He c/o of a HA.  Family also noted a cough.  They called 911 brought him to the ER.  In the ER, patient febrile to 101.7.  CXR/ CT of the chest with bilateral multifocal PNA.  Patient was pancultred and started on IV ABX.  No recent hospitalizations in last 6 months.  Lives at home.  No sick contacts.  COVID/ RVP negative.  Patient given vanco/ cefepime by the ER.  Daughter notes her dad does sometimes choke/ cough after eating.  She is not sure if he could be aspirating.  \par \par hospital course: pt admitted for hypoxia and sepsis due to mutlifocal PNA. given rocephin and azithromycin x 4 days, will complete a 7 day course. course complicated by sundowning (known complication has live in aides for this reason) leading to fall while using bathroom. no trauma or injury resulted from fall. hypoxia self limited. course otherwise unremarkable. pt evaled by SLP- regular diet and reg consistency fluids. \par \par All 10 systems reviewed and found to be negative with the exception of what has been described above.\par \par Vital Signs Last 24 Hrs\par T(C): 36.5 (29 Apr 2022 09:18), Max: 36.8 (29 Apr 2022 00:02)\par T(F): 97.7 (29 Apr 2022 09:18), Max: 98.2 (29 Apr 2022 00:02)\par HR: 51 (29 Apr 2022 09:18) (51 - 81)\par BP: 149/62 (29 Apr 2022 09:18) (148/76 - 163/65)\par BP(mean): --\par RR: 18 (29 Apr 2022 09:18) (16 - 18)\par SpO2: 98% (29 Apr 2022 09:18) (98% - 100%)\par \par PHYSICAL EXAM:\par \par Constitutional: NAD, awake and alert, well-developed\par HEENT: PERR, EOMI, Normal Hearing, MMM\par Neck: Soft and supple, No LAD, No JVD\par Respiratory: Breath sounds are clear bilaterally, No wheezing, rales or rhonchi\par Cardiovascular: S1 and S2, regular rate and rhythm, no Murmurs, gallops or rubs\par Gastrointestinal: Bowel Sounds present, soft, nontender, nondistended, no guarding, no rebound\par Extremities: No peripheral edema\par Vascular: 2+ peripheral pulses\par Neurological: A/O x1, no focal deficits\par Musculoskeletal: 5/5 strength b/l upper and lower extremities\par Skin: No rashes\par \par \par RADIOLOGY/EKG:\par \par < from: CT Chest No Cont (04.25.22 @ 11:33) >\par \par IMPRESSION:\par Findings likely represent multifocal pneumonia. Follow-up to resolution \par recommended.\par \par --- End of Report ---\par \par \par TOREY ZHAO MD; Attending Radiologist\par This document has been electronically signed. Apr 25 2022 12:26PM\par \par < end of copied text >\par \par #Acute hypoxic respiratory failure secondary to Sepsis from Multifocal PNA:  \par negative pancultures.  \par Concern for possible aspiration with bilateral infiltrates and R>>L.  Daughter notes may have some issues with swallowing.  \par No concern for HCAP-- lives at home and no recent hospitalizations.  \par s/p zosyn, switched to Ceftriaxone x 3 days (complete 2 more days rx sent) & completed zithro x 5 days \par afebrile \par leukocytosis resolved \par hypoxia resolved \par Speech and swallow eval. regular consistency and thin liquids  \par \par #Weakness:  resolved \par Secondary to infection.  \par PT eval.  home with assist and home pt \par \par #Dementia/ Depression:  \par Hx of sundowning at night.  \par Cont remeron/ zoloft.  \par Seroquel PRN.  \par  \par #AMA on CKD:  \par Cr -- 1.18   \par \par #Advanced Directives/ Goals of Care:  FULL CODE\par contacted daughter and updated on POC \par above plan discussed with pt, bedside RN and MD Brooks \par spent __41__ mins preparing dc. \par \par Attending Attestation:\par Pt seen and examined with PAM Pang. I personally had a face to face encounter with the patient, examined the patient myself and reviewed the plan of care with the patient and said PAM Pang. I agree with the assessment and plan of PAM Pang as stated and discussed. \par \par \par  Med Reconciliation:\par Medication Reconciliation Status	Admission Reconciliation is Completed\par Discharge Reconciliation is Completed\par Discharge Medications	cefuroxime 500 mg oral tablet: 1 tab(s) orally 2 times a day \par cyanocobalamin 500 mcg oral tablet: 1 tab(s) orally once a day\par furosemide 20 mg oral tablet: 1 tab(s) orally once a day\par gabapentin 300 mg oral capsule: 2 cap(s) orally once a day (at bedtime)\par mirtazapine 15 mg oral tablet: 1 tab(s) orally once a day (at bedtime)\par sertraline 25 mg oral tablet: 1 tab(s) orally once a day\par Vitamin D3 25 mcg (1000 intl units) oral tablet: 1 tab(s) orally once a day\par ,\par ,\par \par  Care Plan/Procedures:\par Discharge Diagnoses, Assessment and Plan of Treatment	PRINCIPAL DISCHARGE DIAGNOSIS\par Diagnosis: Multifocal pneumonia\par Assessment and Plan of Treatment: You were found to have pneumonia which is an infection in one or both of the lungs. It causes the air sacs of the lungs to fill up with fluid or pus. It can range from mild to severe, depending on the type of germ causing the infection, your age, and your overall health. ~*~*~Continue to take _CEFTIN_ antibiotics for __2_ more days (sent to pharmacy). ~*~*~You also completed 5 days of IV antibiotics. Continue to stay hydrated. **Monitor for the following signs/symptoms: Fever > 101, chills, cough with phlegm, shortness of breath and nausea and/or vomiting. If you experience these signs/symptoms please alert your primary care provider or if your signs/symptoms are severe please return to the ED**\par Goal(s)	To get better and follow your care plan as instructed.\par \par  Follow Up:\par Care Providers for Follow up (PCP/Outpatient Provider)	Tashi Akbar (DO)\par Medicine  [facility]\par [address] [place] [address], [address]\[place] [address]\par Phone: [phone]\par Fax: [phone]\par Follow Up Time:\par Patient's Scheduled Appointments	Susanne Phillips\par [facility]\par Geriatrics [address] [place] [address] \par Scheduled Appointment: 05/05/2022\par Additional Scheduled Appointments	Susanne Phillips MD\par [address] [place] [address], [address]\[place] [address]\par [phone]\par Thursaday May 5th @ 11:30\par Discharge Diet	Regular Diet - No restrictions\par Activity	No restrictions\par \par  Quality Measures:\par Patient Condition	Stable\par Hospice Patient	No\par Tobacco Usage Within the Last Year	No\par Does the patient have difficulty running errands alone like visiting a doctor’s office or shopping?	Yes\par Does the patient have difficulty climbing stairs?	Yes\par Cognition: The patient has	Difficulty concentrating  Difficulty making decisions  Difficulty remembering\par Does the patient have a principal diagnosis of ischemic stroke, hemorrhagic stroke, or TIA?	No\par Does the patient have a principal diagnosis of Acute Myocardial Infarction?	No\par Has the patient had a Percutaneous Coronary Intervention?	No\par \par  Home Health:\par Discharged with Home Health Care Services?	Yes\par Face-To-Face Contact	As certified below, I, or a nurse practitioner or physician assistant working with me, had a face-to-face encounter that meets the physician face-to-face encounter requirements.\par Need for Skilled Services	Medication teaching and assessment  Observation and assessment  Rehabilitation services  Teaching and training\par Based on the above findings, the following intermittent skilled services are medically necessary home health services:	Nursing  Physical therapy\par Home Bound Status	Requires supervison due to deteriorating mental status...  Chest pain/weakness during/after ambulation \par greater than 20 feet  Fall risk\par Mental Illness	dementia\par Leaving Home is Contraindicated...\par The patient has a condition which confines the patient to the home or such that leaving his/her home is medically contraindicated:	Other, specify...\par Other Patient Condition	dementia and confusion\par Encounter Date	29-Apr-2022\par \par  Document Complete:\par Care Provider Seen in Hospital	Sachin Brooks\par Bella Pang\par Physician Section Complete	This document is complete and the patient is ready for discharge.\par For questions about your prescriptions, please call:	[phone]\par Is this contact telephone number correct?	Yes\par \par \par \par Electronic Signatures:\par Sachin Brooks ()  (Signed 29-Apr-2022 16:02)\par 	Authored: Hospital Course\par 	Co-Signer: Hospital Course, Med Reconciliation, Care Plan/Procedures, Follow Up, Quality Measures, Home Health, Covid Information, Document Complete\par Bella Pang (NP)  (Signed 29-Apr-2022 15:33)\par 	Entered: Med Reconciliation, Document Complete\par 	Authored: Hospital Course, Med Reconciliation, Care Plan/Procedures, Follow Up, Quality Measures, Home Health, Document Complete\par \par \par Last Updated: 29-Apr-2022 16:02 by Sachin Brooks ()\par \par \par

## 2022-05-09 NOTE — PHYSICAL EXAM
[No Acute Distress] : no acute distress [Well Nourished] : well nourished [Well Developed] : well developed [Well-Appearing] : well-appearing [Normal Sclera/Conjunctiva] : normal sclera/conjunctiva [Normal Outer Ear/Nose] : the outer ears and nose were normal in appearance [Normal Oropharynx] : the oropharynx was normal [Supple] : supple [No Respiratory Distress] : no respiratory distress  [Clear to Auscultation] : lungs were clear to auscultation bilaterally [No Accessory Muscle Use] : no accessory muscle use [Normal Rate] : normal rate  [Regular Rhythm] : with a regular rhythm [Normal S1, S2] : normal S1 and S2 [Pedal Pulses Present] : the pedal pulses are present [No Extremity Clubbing/Cyanosis] : no extremity clubbing/cyanosis [Soft] : abdomen soft [Non Tender] : non-tender [Non-distended] : non-distended [Normal Bowel Sounds] : normal bowel sounds [No Spinal Tenderness] : no spinal tenderness [Grossly Normal Strength/Tone] : grossly normal strength/tone [No Rash] : no rash [Coordination Grossly Intact] : coordination grossly intact [No Focal Deficits] : no focal deficits [Normal Affect] : the affect was normal [de-identified] : s/p trauma R eye, sunken, blind [de-identified] : no thrush [de-identified] : trace edR>Samantha BLE [de-identified] : CN 2-12 grossly intact [de-identified] : A&O x person, place
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
## 2022-05-09 NOTE — PLAN
[FreeTextEntry1] : A/P:\par 1. Multi-focal Pneumonia:\par - s/p IV rocephin/azithro; completed po ceftin on discharge\par - Adhere to all medications including demonstrating proper use of inhalers and nebulizers.\par - Increase activity as tolerated and maintain optimal activity levels. Continue coughing and deep breathing exercises including use of Incentive Spirometry.\par - Receive routine pneumococcal and influenza vaccinations.\par - Maintain proper nutrition and adequate hydration.\par - Notify NP for worsening symptoms including fever, chills, SOB, CP, increased cough and secretions.\par - Follow up with MD within 7 days of discharge.\par \par 2. Dementia:\par - supportive care, decline expected,\par - management per PCP\par \par 3. Depression:\par - con't zoloft, supportive care\par \par 4. Hypertension: \par - lasix daily, elevation, low Na diet.\par \par \par \par \par \par

## 2022-05-17 ENCOUNTER — RX RENEWAL (OUTPATIENT)
Age: 87
End: 2022-05-17

## 2022-06-03 ENCOUNTER — APPOINTMENT (OUTPATIENT)
Dept: GERIATRICS | Facility: CLINIC | Age: 87
End: 2022-06-03
Payer: SELF-PAY

## 2022-06-03 VITALS
HEART RATE: 51 BPM | SYSTOLIC BLOOD PRESSURE: 130 MMHG | WEIGHT: 179 LBS | TEMPERATURE: 97.9 F | OXYGEN SATURATION: 98 % | BODY MASS INDEX: 26.43 KG/M2 | RESPIRATION RATE: 18 BRPM | DIASTOLIC BLOOD PRESSURE: 60 MMHG

## 2022-06-03 DIAGNOSIS — Z91.81 HISTORY OF FALLING: ICD-10-CM

## 2022-06-03 DIAGNOSIS — Z23 ENCOUNTER FOR IMMUNIZATION: ICD-10-CM

## 2022-06-03 PROCEDURE — 90677 PCV20 VACCINE IM: CPT

## 2022-06-03 PROCEDURE — G0009: CPT

## 2022-06-03 PROCEDURE — 99214 OFFICE O/P EST MOD 30 MIN: CPT | Mod: 25

## 2022-06-03 NOTE — HISTORY OF PRESENT ILLNESS
[With Patient/Caregiver] : , with patient/caregiver [I will adhere to the patient's wishes.] : I will adhere to the patient's wishes. [FreeTextEntry1] : JANET DALLAS is a 89 year old man with a PMH of advanced dementia, HTN, insomnia, and gait instability who presents for follow up visit. Patient is accompanied by daughter, Renetta, who provided collateral history.\par \par Since last visit, patient was hospitalized at [facility] from 4/25/22 - 4/29/22 for sepsis s/t multifocal pneumonia. Patient was admitted for 4 days after receiving IV antibiotics. When asked, patient states that he is doing well. Daughter, Renetta states that patient has recovered well, but continues to struggle with insomnia. Also, mentioned that patient's legs are weaker than they were prior to hospitalization.\par \par Hospital course reviewed. Post-hospitalization note from home care NP on 5/9/22 also appreciated.\par \par Insomnia:\par -continues\par -daughter states that patient sleeps throughout the night 1-2 times per week but more often than not, will not fall asleep until 4am and will then sleep until 11am \par -currently taking mirtazipine 15mg and gabapentin 600mg at bedtime \par -patient also has HHAs nightly from 10pm-6am\par \par Leg weakness:\par -has been doing PT once a week\par -trying to go for walks with walker; daughter states that patient requires a lot of encouragement to engage in activity\par -patient went swimming recently at daughter's pool \par \par Dysphagia:\par -occasionally coughing with fluids\par -unclear pneumonia in April was s/t aspiration\par -evaluated at bedside by SLP while in hospital but did not have a formal swallow evaluation\par \par Dementia:\par -sundowning behaviors stable \par -previously went to adult day center twice a week; will start going again soon\par \par  [AdvancecareDate] : 05/22 [FreeTextEntry4] : daughter Romina states that she has HCP form- she will obtain copy and bring to next visit\par -no MOLST form completed; briefly discussed patient's wishes today given recent hospitalization; Renetta states "I think he would wanted all aggressive measures as long as it were reversible but I am not sure and I need to check his living will"- plan to address at follow up visit

## 2022-06-03 NOTE — PHYSICAL EXAM
[Alert] : alert [No Acute Distress] : in no acute distress [Normal Outer Ear/Nose] : the ears and nose were normal in appearance [Both Tympanic Membranes Were Examined] : both tympanic membranes were normal [Normal Appearance] : the appearance of the neck was normal [Supple] : the neck was supple [No Respiratory Distress] : no respiratory distress [No Acc Muscle Use] : no accessory muscle use [Respiration, Rhythm And Depth] : normal respiratory rhythm and effort [Auscultation Breath Sounds / Voice Sounds] : lungs were clear to auscultation bilaterally [Normal S1, S2] : normal S1 and S2 [Heart Rate And Rhythm] : heart rate was normal and rhythm regular [Edema] : edema was not present [Bowel Sounds] : normal bowel sounds [Abdomen Tenderness] : non-tender [Abdomen Soft] : soft [Cervical Lymph Nodes Enlarged Posterior Bilaterally] : posterior cervical [Supraclavicular Lymph Nodes Enlarged Bilaterally] : supraclavicular [Cervical Lymph Nodes Enlarged Anterior Bilaterally] : anterior cervical, supraclavicular [Axillary Lymph Nodes Enlarged Bilaterally] : axillary [No CVA Tenderness] : no CVA  tenderness [No Spinal Tenderness] : no spinal tenderness [Motor Tone] : muscle strength and tone were normal [Normal Color / Pigmentation] : normal skin color and pigmentation [Normal Affect] : the affect was normal [Normal Mood] : the mood was normal [Normal Gait] : abnormal gait [Normal Insight/Judgment] : insight and judgment were not intact [de-identified] : well appearing; answers questions appropriately [FreeTextEntry1] : right eye patch in place

## 2022-06-03 NOTE — REASON FOR VISIT
[Follow-Up] : a follow-up visit [Family Member] : family member [FreeTextEntry3] : daughter, Renetta

## 2022-06-03 NOTE — ASSESSMENT
[FreeTextEntry1] : follow up with Dr. Phillips in 3 months, earlier PRN\par blood work at next visit \par \par Suzanne Pérez, FNP-BC

## 2022-06-03 NOTE — REVIEW OF SYSTEMS
[Feeling Poorly] : not feeling poorly [Feeling Tired] : not feeling tired [Palpitations] : no palpitations [Cough] : no cough [SOB on Exertion] : no shortness of breath during exertion [Abdominal Pain] : no abdominal pain [Constipation] : no constipation [Diarrhea] : no diarrhea [Dysuria] : no dysuria [Skin Lesions] : no skin lesions [Dizziness] : no dizziness [FreeTextEntry1] : limited ROS d/t dx of dementia

## 2022-06-16 NOTE — ED ADULT TRIAGE NOTE - HEIGHT IN FEET
5 Dupixent Counseling: I discussed with the patient the risks of dupilumab including but not limited to eye infection and irritation, cold sores, injection site reactions, worsening of asthma, allergic reactions and increased risk of parasitic infection.  Live vaccines should be avoided while taking dupilumab. Dupilumab will also interact with certain medications such as warfarin and cyclosporine. The patient understands that monitoring is required and they must alert us or the primary physician if symptoms of infection or other concerning signs are noted.

## 2022-07-05 ENCOUNTER — RX RENEWAL (OUTPATIENT)
Age: 87
End: 2022-07-05

## 2022-07-17 ENCOUNTER — INPATIENT (INPATIENT)
Facility: HOSPITAL | Age: 87
LOS: 4 days | Discharge: HOME HEALTH | DRG: 178 | End: 2022-07-22
Attending: FAMILY MEDICINE | Admitting: INTERNAL MEDICINE
Payer: MEDICARE

## 2022-07-17 VITALS
RESPIRATION RATE: 18 BRPM | OXYGEN SATURATION: 96 % | DIASTOLIC BLOOD PRESSURE: 69 MMHG | TEMPERATURE: 100 F | HEIGHT: 68 IN | SYSTOLIC BLOOD PRESSURE: 130 MMHG | WEIGHT: 199.96 LBS | HEART RATE: 88 BPM

## 2022-07-17 DIAGNOSIS — Z96.651 PRESENCE OF RIGHT ARTIFICIAL KNEE JOINT: Chronic | ICD-10-CM

## 2022-07-17 DIAGNOSIS — Z90.89 ACQUIRED ABSENCE OF OTHER ORGANS: Chronic | ICD-10-CM

## 2022-07-17 PROCEDURE — 99285 EMERGENCY DEPT VISIT HI MDM: CPT | Mod: CS

## 2022-07-17 RX ORDER — PIPERACILLIN AND TAZOBACTAM 4; .5 G/20ML; G/20ML
3.38 INJECTION, POWDER, LYOPHILIZED, FOR SOLUTION INTRAVENOUS ONCE
Refills: 0 | Status: COMPLETED | OUTPATIENT
Start: 2022-07-17 | End: 2022-07-17

## 2022-07-17 RX ORDER — SODIUM CHLORIDE 9 MG/ML
2800 INJECTION INTRAMUSCULAR; INTRAVENOUS; SUBCUTANEOUS ONCE
Refills: 0 | Status: COMPLETED | OUTPATIENT
Start: 2022-07-17 | End: 2022-07-17

## 2022-07-17 RX ORDER — ACETAMINOPHEN 500 MG
650 TABLET ORAL ONCE
Refills: 0 | Status: COMPLETED | OUTPATIENT
Start: 2022-07-17 | End: 2022-07-17

## 2022-07-17 NOTE — ED ADULT TRIAGE NOTE - CHIEF COMPLAINT QUOTE
pt BIBEMS c/o dizziness and weakness. as per EMS pt was unable to stand at home and family called ambulance. pt reports dizziness. denies pain. as per family, pt has not been drinking water today.

## 2022-07-18 DIAGNOSIS — U07.1 COVID-19: ICD-10-CM

## 2022-07-18 LAB
ALBUMIN SERPL ELPH-MCNC: 3.7 G/DL — SIGNIFICANT CHANGE UP (ref 3.3–5)
ALP SERPL-CCNC: 58 U/L — SIGNIFICANT CHANGE UP (ref 40–120)
ALT FLD-CCNC: 15 U/L — SIGNIFICANT CHANGE UP (ref 12–78)
ANION GAP SERPL CALC-SCNC: 3 MMOL/L — LOW (ref 5–17)
APPEARANCE UR: CLEAR — SIGNIFICANT CHANGE UP
APTT BLD: 28.8 SEC — SIGNIFICANT CHANGE UP (ref 27.5–35.5)
AST SERPL-CCNC: 13 U/L — LOW (ref 15–37)
BASOPHILS # BLD AUTO: 0.01 K/UL — SIGNIFICANT CHANGE UP (ref 0–0.2)
BASOPHILS NFR BLD AUTO: 0.2 % — SIGNIFICANT CHANGE UP (ref 0–2)
BILIRUB SERPL-MCNC: 0.9 MG/DL — SIGNIFICANT CHANGE UP (ref 0.2–1.2)
BILIRUB UR-MCNC: NEGATIVE — SIGNIFICANT CHANGE UP
BUN SERPL-MCNC: 30 MG/DL — HIGH (ref 7–23)
CALCIUM SERPL-MCNC: 9 MG/DL — SIGNIFICANT CHANGE UP (ref 8.5–10.1)
CHLORIDE SERPL-SCNC: 102 MMOL/L — SIGNIFICANT CHANGE UP (ref 96–108)
CO2 SERPL-SCNC: 28 MMOL/L — SIGNIFICANT CHANGE UP (ref 22–31)
COLOR SPEC: YELLOW — SIGNIFICANT CHANGE UP
CREAT SERPL-MCNC: 1.3 MG/DL — SIGNIFICANT CHANGE UP (ref 0.5–1.3)
DIFF PNL FLD: NEGATIVE — SIGNIFICANT CHANGE UP
EGFR: 52 ML/MIN/1.73M2 — LOW
EOSINOPHIL # BLD AUTO: 0 K/UL — SIGNIFICANT CHANGE UP (ref 0–0.5)
EOSINOPHIL NFR BLD AUTO: 0 % — SIGNIFICANT CHANGE UP (ref 0–6)
FLUAV AG NPH QL: SIGNIFICANT CHANGE UP
FLUBV AG NPH QL: SIGNIFICANT CHANGE UP
GLUCOSE SERPL-MCNC: 102 MG/DL — HIGH (ref 70–99)
GLUCOSE UR QL: NEGATIVE — SIGNIFICANT CHANGE UP
HCT VFR BLD CALC: 37 % — LOW (ref 39–50)
HGB BLD-MCNC: 12.5 G/DL — LOW (ref 13–17)
IMM GRANULOCYTES NFR BLD AUTO: 0.3 % — SIGNIFICANT CHANGE UP (ref 0–1.5)
INR BLD: 1.14 RATIO — SIGNIFICANT CHANGE UP (ref 0.88–1.16)
KETONES UR-MCNC: NEGATIVE — SIGNIFICANT CHANGE UP
LACTATE SERPL-SCNC: 1.5 MMOL/L — SIGNIFICANT CHANGE UP (ref 0.7–2)
LEUKOCYTE ESTERASE UR-ACNC: NEGATIVE — SIGNIFICANT CHANGE UP
LYMPHOCYTES # BLD AUTO: 0.38 K/UL — LOW (ref 1–3.3)
LYMPHOCYTES # BLD AUTO: 6.3 % — LOW (ref 13–44)
MCHC RBC-ENTMCNC: 33 PG — SIGNIFICANT CHANGE UP (ref 27–34)
MCHC RBC-ENTMCNC: 33.8 GM/DL — SIGNIFICANT CHANGE UP (ref 32–36)
MCV RBC AUTO: 97.6 FL — SIGNIFICANT CHANGE UP (ref 80–100)
MONOCYTES # BLD AUTO: 1.26 K/UL — HIGH (ref 0–0.9)
MONOCYTES NFR BLD AUTO: 21 % — HIGH (ref 2–14)
NEUTROPHILS # BLD AUTO: 4.32 K/UL — SIGNIFICANT CHANGE UP (ref 1.8–7.4)
NEUTROPHILS NFR BLD AUTO: 72.2 % — SIGNIFICANT CHANGE UP (ref 43–77)
NITRITE UR-MCNC: NEGATIVE — SIGNIFICANT CHANGE UP
NT-PROBNP SERPL-SCNC: 150 PG/ML — SIGNIFICANT CHANGE UP (ref 0–450)
PH UR: 6 — SIGNIFICANT CHANGE UP (ref 5–8)
PLATELET # BLD AUTO: 144 K/UL — LOW (ref 150–400)
POTASSIUM SERPL-MCNC: 3.9 MMOL/L — SIGNIFICANT CHANGE UP (ref 3.5–5.3)
POTASSIUM SERPL-SCNC: 3.9 MMOL/L — SIGNIFICANT CHANGE UP (ref 3.5–5.3)
PROT SERPL-MCNC: 7.3 GM/DL — SIGNIFICANT CHANGE UP (ref 6–8.3)
PROT UR-MCNC: NEGATIVE — SIGNIFICANT CHANGE UP
PROTHROM AB SERPL-ACNC: 13.2 SEC — SIGNIFICANT CHANGE UP (ref 10.5–13.4)
RBC # BLD: 3.79 M/UL — LOW (ref 4.2–5.8)
RBC # FLD: 12.6 % — SIGNIFICANT CHANGE UP (ref 10.3–14.5)
RSV RNA NPH QL NAA+NON-PROBE: SIGNIFICANT CHANGE UP
SARS-COV-2 RNA SPEC QL NAA+PROBE: DETECTED
SODIUM SERPL-SCNC: 133 MMOL/L — LOW (ref 135–145)
SP GR SPEC: 1.01 — SIGNIFICANT CHANGE UP (ref 1.01–1.02)
TROPONIN I, HIGH SENSITIVITY RESULT: 12.31 NG/L — SIGNIFICANT CHANGE UP
TROPONIN I, HIGH SENSITIVITY RESULT: 19.18 NG/L — SIGNIFICANT CHANGE UP
UROBILINOGEN FLD QL: NEGATIVE — SIGNIFICANT CHANGE UP
WBC # BLD: 5.99 K/UL — SIGNIFICANT CHANGE UP (ref 3.8–10.5)
WBC # FLD AUTO: 5.99 K/UL — SIGNIFICANT CHANGE UP (ref 3.8–10.5)

## 2022-07-18 PROCEDURE — 97163 PT EVAL HIGH COMPLEX 45 MIN: CPT | Mod: GP

## 2022-07-18 PROCEDURE — 72125 CT NECK SPINE W/O DYE: CPT | Mod: 26,MA

## 2022-07-18 PROCEDURE — 36415 COLL VENOUS BLD VENIPUNCTURE: CPT

## 2022-07-18 PROCEDURE — 85027 COMPLETE CBC AUTOMATED: CPT

## 2022-07-18 PROCEDURE — 71045 X-RAY EXAM CHEST 1 VIEW: CPT | Mod: 26

## 2022-07-18 PROCEDURE — 93306 TTE W/DOPPLER COMPLETE: CPT

## 2022-07-18 PROCEDURE — 70450 CT HEAD/BRAIN W/O DYE: CPT | Mod: 26,MA

## 2022-07-18 PROCEDURE — 80048 BASIC METABOLIC PNL TOTAL CA: CPT

## 2022-07-18 PROCEDURE — 82962 GLUCOSE BLOOD TEST: CPT

## 2022-07-18 PROCEDURE — 97164 PT RE-EVAL EST PLAN CARE: CPT | Mod: GP

## 2022-07-18 PROCEDURE — 83036 HEMOGLOBIN GLYCOSYLATED A1C: CPT

## 2022-07-18 PROCEDURE — 80061 LIPID PANEL: CPT

## 2022-07-18 PROCEDURE — 99235 HOSP IP/OBS SAME DATE MOD 70: CPT

## 2022-07-18 PROCEDURE — 84484 ASSAY OF TROPONIN QUANT: CPT

## 2022-07-18 PROCEDURE — 97116 GAIT TRAINING THERAPY: CPT | Mod: GP

## 2022-07-18 RX ORDER — GABAPENTIN 400 MG/1
300 CAPSULE ORAL
Refills: 0 | Status: DISCONTINUED | OUTPATIENT
Start: 2022-07-18 | End: 2022-07-22

## 2022-07-18 RX ORDER — MIRTAZAPINE 45 MG/1
15 TABLET, ORALLY DISINTEGRATING ORAL AT BEDTIME
Refills: 0 | Status: DISCONTINUED | OUTPATIENT
Start: 2022-07-18 | End: 2022-07-22

## 2022-07-18 RX ORDER — ENOXAPARIN SODIUM 100 MG/ML
40 INJECTION SUBCUTANEOUS EVERY 24 HOURS
Refills: 0 | Status: DISCONTINUED | OUTPATIENT
Start: 2022-07-19 | End: 2022-07-22

## 2022-07-18 RX ORDER — ASPIRIN/CALCIUM CARB/MAGNESIUM 324 MG
81 TABLET ORAL DAILY
Refills: 0 | Status: DISCONTINUED | OUTPATIENT
Start: 2022-07-18 | End: 2022-07-19

## 2022-07-18 RX ORDER — PREGABALIN 225 MG/1
1 CAPSULE ORAL
Qty: 0 | Refills: 0 | DISCHARGE

## 2022-07-18 RX ORDER — HALOPERIDOL DECANOATE 100 MG/ML
1 INJECTION INTRAMUSCULAR EVERY 6 HOURS
Refills: 0 | Status: DISCONTINUED | OUTPATIENT
Start: 2022-07-18 | End: 2022-07-19

## 2022-07-18 RX ORDER — ATORVASTATIN CALCIUM 80 MG/1
80 TABLET, FILM COATED ORAL AT BEDTIME
Refills: 0 | Status: DISCONTINUED | OUTPATIENT
Start: 2022-07-18 | End: 2022-07-19

## 2022-07-18 RX ORDER — SERTRALINE 25 MG/1
25 TABLET, FILM COATED ORAL DAILY
Refills: 0 | Status: DISCONTINUED | OUTPATIENT
Start: 2022-07-18 | End: 2022-07-22

## 2022-07-18 RX ADMIN — Medication 650 MILLIGRAM(S): at 00:51

## 2022-07-18 RX ADMIN — MIRTAZAPINE 15 MILLIGRAM(S): 45 TABLET, ORALLY DISINTEGRATING ORAL at 22:36

## 2022-07-18 RX ADMIN — SODIUM CHLORIDE 2800 MILLILITER(S): 9 INJECTION INTRAMUSCULAR; INTRAVENOUS; SUBCUTANEOUS at 00:52

## 2022-07-18 RX ADMIN — Medication 1200 MILLIGRAM(S): at 06:07

## 2022-07-18 RX ADMIN — HALOPERIDOL DECANOATE 1 MILLIGRAM(S): 100 INJECTION INTRAMUSCULAR at 16:16

## 2022-07-18 RX ADMIN — ATORVASTATIN CALCIUM 80 MILLIGRAM(S): 80 TABLET, FILM COATED ORAL at 22:32

## 2022-07-18 RX ADMIN — PIPERACILLIN AND TAZOBACTAM 200 GRAM(S): 4; .5 INJECTION, POWDER, LYOPHILIZED, FOR SOLUTION INTRAVENOUS at 00:52

## 2022-07-18 RX ADMIN — SERTRALINE 25 MILLIGRAM(S): 25 TABLET, FILM COATED ORAL at 11:39

## 2022-07-18 RX ADMIN — Medication 81 MILLIGRAM(S): at 11:39

## 2022-07-18 RX ADMIN — GABAPENTIN 300 MILLIGRAM(S): 400 CAPSULE ORAL at 22:33

## 2022-07-18 NOTE — ED PROVIDER NOTE - PROGRESS NOTE DETAILS
Taiwo Bruner MD : wife does not feel comfortable taking care of him at home as she is elderly, she states he usually is able to get around well with walker but has lost all his strength over the past two days, will obs.

## 2022-07-18 NOTE — H&P ADULT - NSHPOUTPATIENTPROVIDERS_GEN_ALL_CORE
Medical Week 2 Survey      Responses   [facility] patient discharged from?  [place]   Does the patient have one of the following disease processes/diagnoses(primary or secondary)?  Other   Week 2 attempt successful?  No   Unsuccessful attempts  Attempt 1   Discharge diagnosis  SBO          Elinor Ramos RN   PMD:  Dr. [facility]  Cardio:  Dr. Romero

## 2022-07-18 NOTE — ED PROVIDER NOTE - BIRTH SEX
Counseling  Weight management:  The patient was counseled regarding nutrition and physical activity    Immunizations: per order -- NO COMPLETE IMMUNIZATION RECORD AT THIS TIME  History of previous adverse reactions to immunizations? no  Immunizations given today: No     School & Sport PE Forms filled    Anticipatory guidance:       Reassurance       Environmental control - nutrition       Promote healthy diet & physical activity for age       Annual dental & optomeetry / ophthalmology checkups       Good handwashing - safety       Bring complete Immunization Record next visit       Watchful observation    RTC 6 - 12 months for routine HM or PRN    Discussed findings & plans with dad & patient  Both verbalized understanding & consent    Follow-up yearly for a well visit, or sooner as needed.  
Male

## 2022-07-18 NOTE — ED PROVIDER NOTE - NEUROLOGICAL, MLM
Alert and oriented X1; follows all commands; 4/5 strength right upper extremity; 5/5 strength left upper and bilat lower extremities.

## 2022-07-18 NOTE — ED PROVIDER NOTE - OBJECTIVE STATEMENT
Pt. is a 89 yo M with PMHx of dementia, HTN, hyperlipidemia, osteoarthritis, right knee replacement presents with dizziness.  Patient was complaining earlier of feeling weak and dizzy.  Son called to say patient fell and may have hit his head. Does not take any anticoagulation. Patient denies any pain, nausea, vomiting, weakness.  Following all commands.  Has low grade temperature on arrival.

## 2022-07-18 NOTE — H&P ADULT - NSHPREVIEWOFSYSTEMS_GEN_ALL_CORE
ROS:  General:  fever  Skin: No rash or bothersome skin lesions  Musculoskeletal: No arthalgias, myalgias or joint swelling  Eyes: No visual changes or eye pain  Ears: No hearing loss , otorrhea or ear pain  Nose, Mouth, Throat: No nasal congestion, rhinorrhea, oral lesions, postnasal drip or sore throat  Cardio: No chest pain or palpitations. no lower extremity edema. no syncope. no claudication.   Respiratory: No cough, shortness of breath or wheezing   GI: No diarrhea, constipation, blood in stools, abdominal pain, vomiting or heartburn  : No urinary frequency, hematuria, incontinence, or dysuria  Neurologic: No headaches, parasthesias, confusion, dysarthria or gait instability  Psychiatric:  No anxiety or depression  Lymphatic:  No easy bruising, easy bleeding or swollen glands  Allergic: No itching, sneezing , watery eyes, clear rhinorrhea or recurrent infections poor historian

## 2022-07-18 NOTE — H&P ADULT - ASSESSMENT
# Weakness COVID+  sudden onset  r/o TIA CVA  MR brain  low suspicion for neuro event  PT eval  doesn't qualify for Dexa, Rem  check orthostatics    * Abnormal EKG  check another Trop  tele monitor  I held Lasix for now      #Dementia/ Depression:    Hx of sundowning at night.    Cont Remeron zoloft.    Seroquel PRN.    May require constant observation.        #Advanced Directives/ Goals of Care: left message for son         # Weakness COVID+  sudden onset  r/o TIA CVA  MR brain  low suspicion for neuro event  PT eval  doesn't qualify for Dexa, Rem  check orthostatics    * Abnormal EKG  check another Trop  tele monitor  I held Lasix for now      #Dementia/ Depression:    Hx of sundowning at night.    Cont Remeron zoloft.    Haldol PRN.    May require constant observation.        #Advanced Directives/ Goals of Care: left message for son   Azithromycin Counseling:  I discussed with the patient the risks of azithromycin including but not limited to GI upset, allergic reaction, drug rash, diarrhea, and yeast infections.

## 2022-07-18 NOTE — PHARMACOTHERAPY INTERVENTION NOTE - COMMENTS
Med history complete, reviewed medications and allergies with patient, patient able to provide limited history with prompting confirmed medication list with doctor first med profile and [facility] pharmacy records [phone]

## 2022-07-18 NOTE — ED ADULT NURSE NOTE - OBJECTIVE STATEMENT
pt BIBEMS c/o dizziness and weakness. Pt wife called and stated pt fell at home. Pt did hit his head, denies AC use and confirmed with wife. pt reports dizziness. denies pain. as per family, pt has not been drinking water today. Pt has full ROM. No other complaints at this time.

## 2022-07-18 NOTE — H&P ADULT - NSHPLABSRESULTS_GEN_ALL_CORE
12.5   5.99  )-----------( 144      ( 17 Jul 2022 23:59 )             37.0   07-17    133<L>  |  102  |  30<H>  ----------------------------<  102<H>  3.9   |  28  |  1.30    Ca    9.0      17 Jul 2022 23:59    TPro  7.3  /  Alb  3.7  /  TBili  0.9  /  DBili  x   /  AST  13<L>  /  ALT  15  /  AlkPhos  58  07-17      ccx< from: Xray Chest 1 View- PORTABLE-Urgent (07.18.22 @ 00:38) >    IMPRESSION: Clear lungs at this time.     12 Lead ECG (07.17.22 @ 23:53) >    Sinus rhythm [id number] degree A-V block  Minimal voltage criteria for LVH, may be normal variant  Inferior infarct , age undetermined  Cannot rule out Anterior infarct , age undetermined  Abnormal ECG

## 2022-07-18 NOTE — H&P ADULT - NSHPPHYSICALEXAM_GEN_ALL_CORE
Vital Signs Last 24 Hrs  T(C): 37 (18 Jul 2022 06:52), Max: 37.9 (17 Jul 2022 23:14)  T(F): 98.6 (18 Jul 2022 06:52), Max: 100.3 (17 Jul 2022 23:14)  HR: 70 (18 Jul 2022 06:52) (70 - 88)  BP: 136/78 (18 Jul 2022 06:52) (129/69 - 136/78)  BP(mean): 82 (18 Jul 2022 06:52) (82 - 86)  RR: 18 (18 Jul 2022 06:52) (16 - 18)  SpO2: 98% (18 Jul 2022 06:52) (95% - 98%)    Parameters below as of 18 Jul 2022 06:52  Patient On (Oxygen Delivery Method): room air      General:   elderly male.  NAD.    Skin: no rash or prominent lesions  Head: normocephalic, atraumatic     Sinuses: non-tender  Nose: no external lesions, mucosa non-inflamed, septum and turbinates normal  Throat: no erythema, exudates or lesions.  Neck: Supple without lymphadenopathy.   Breasts: No palpable masses or lesions.  Heart: +murmur II/VI.  RRR.    Lungs: crackles at bases  Chest wall: Normal insp   Abdomen:  Soft, NT/ND, normal bowel sounds, no HSM, no masses.  No peritoneal signs.   Back: spine normal without deformity or tenderness.  Normal ROM   : Exam normal.  no inguinal hernias.  Extremities: No dFROM  Neurologic: non focal mot str 5/5 all extr gait not exam  Psychiatric: pleasantly confused. NAD.  oriented to person / family.  not time.

## 2022-07-18 NOTE — ED PROVIDER NOTE - NSICDXPASTMEDICALHX_GEN_ALL_CORE_FT
PAST MEDICAL HISTORY:  Anxiety     BPH (benign prostatic hyperplasia)     Dementia     Depression     Hyperlipidemia     Hypertension     OA (osteoarthritis)

## 2022-07-18 NOTE — H&P ADULT - HISTORY OF PRESENT ILLNESS
89 y/o male with PMHX of moderate dementia (oriented to person +/- place not date at baseline), anxiety/ depression, heart murmur and OA who presented to  with CC of fever and cough from home.  History is obtained from daughter at bedside.  HCP is wife.  Daughter notes her dad lives at home with wife and son.  Wife and son take care of him during the day and they have an aide who helps them at night due to sundowning/ confusion.  He is normally able to ambulate with help with a walker.  She notes her father was in his usual state of health up until this morning.  When we got up, his wife noted he had a fever to 103.  He c/o of a HA.  Family also noted a cough.  They called 911 brought him to the ER.  In the ER, patient febrile to 101.7.  CXR/ CT of the chest with bilateral multifocal PNA.  Patient was pancultred and started on IV ABX.  No recent hospitalizations in last 6 months.  Lives at home.  No sick contacts.  COVID/ RVP negative.  Patient given vanco/ cefepime by the ER.  Daughter notes her dad does sometimes choke/ cough after eating.  She is not sure if he could be aspirating.        PAST MEDICAL & SURGICAL HISTORY:  Hypertension  Anxiety  Hyperlipidemia  OA (osteoarthritis)  History of tonsillectomy  H/O total knee replacement, right      FAMILY HISTORY:  Both parents .    Has son and daughter-- A&W.      Social History:    Lives at home with wife and son.    No tob.  No ETOH.      Allergies:  No Known Allergies    Home Medications:  cyanocobalamin 500 mcg oral tablet: 1 tab(s) orally once a day (2022 16:09)  furosemide 20 mg oral tablet: 1 tab(s) orally once a day (2022 16:09)  gabapentin 300 mg oral capsule: 2 cap(s) orally once a day (at bedtime) (2022 16:09)  mirtazapine 15 mg oral tablet: 1 tab(s) orally once a day (at bedtime) (2022 16:09)  sertraline 25 mg oral tablet: 1 tab(s) orally once a day (2022 14:27)  Vitamin D3 25 mcg (1000 intl units) oral tablet: 1 tab(s) orally once a day (2022 16:09)         89 yo M with PMHx of dementia, HTN, hyperlipidemia, osteoarthritis, right knee replacement presents with dizziness.  Patient was complaining earlier of feeling weak and dizzy.  Son called to say patient fell and may have hit his head. Does not take any anticoagulation. Patient denies any pain, nausea, vomiting, weakness.  Following all commands pleasant. He tested + for COVID; cased d/w son; ED physician couldn't dc pt due to gen weakness adm for obs, after d/w son decided to r/o cva. After few hrs he started to sundown I will Rx Haldol for agitation.

## 2022-07-19 LAB
ANION GAP SERPL CALC-SCNC: 7 MMOL/L — SIGNIFICANT CHANGE UP (ref 5–17)
BUN SERPL-MCNC: 24 MG/DL — HIGH (ref 7–23)
CALCIUM SERPL-MCNC: 8.9 MG/DL — SIGNIFICANT CHANGE UP (ref 8.5–10.1)
CHLORIDE SERPL-SCNC: 109 MMOL/L — HIGH (ref 96–108)
CHOLEST SERPL-MCNC: 134 MG/DL — SIGNIFICANT CHANGE UP
CO2 SERPL-SCNC: 24 MMOL/L — SIGNIFICANT CHANGE UP (ref 22–31)
CREAT SERPL-MCNC: 1.06 MG/DL — SIGNIFICANT CHANGE UP (ref 0.5–1.3)
CULTURE RESULTS: SIGNIFICANT CHANGE UP
EGFR: 67 ML/MIN/1.73M2 — SIGNIFICANT CHANGE UP
GLUCOSE SERPL-MCNC: 85 MG/DL — SIGNIFICANT CHANGE UP (ref 70–99)
HDLC SERPL-MCNC: 42 MG/DL — SIGNIFICANT CHANGE UP
LIPID PNL WITH DIRECT LDL SERPL: 81 MG/DL — SIGNIFICANT CHANGE UP
NON HDL CHOLESTEROL: 93 MG/DL — SIGNIFICANT CHANGE UP
POTASSIUM SERPL-MCNC: 3.7 MMOL/L — SIGNIFICANT CHANGE UP (ref 3.5–5.3)
POTASSIUM SERPL-SCNC: 3.7 MMOL/L — SIGNIFICANT CHANGE UP (ref 3.5–5.3)
SODIUM SERPL-SCNC: 140 MMOL/L — SIGNIFICANT CHANGE UP (ref 135–145)
SPECIMEN SOURCE: SIGNIFICANT CHANGE UP
TRIGL SERPL-MCNC: 60 MG/DL — SIGNIFICANT CHANGE UP

## 2022-07-19 PROCEDURE — 93306 TTE W/DOPPLER COMPLETE: CPT | Mod: 26

## 2022-07-19 PROCEDURE — 99232 SBSQ HOSP IP/OBS MODERATE 35: CPT

## 2022-07-19 RX ORDER — BENZOCAINE AND MENTHOL 5; 1 G/100ML; G/100ML
1 LIQUID ORAL ONCE
Refills: 0 | Status: DISCONTINUED | OUTPATIENT
Start: 2022-07-19 | End: 2022-07-22

## 2022-07-19 RX ORDER — ACETAMINOPHEN 500 MG
650 TABLET ORAL ONCE
Refills: 0 | Status: COMPLETED | OUTPATIENT
Start: 2022-07-19 | End: 2022-07-19

## 2022-07-19 RX ORDER — HALOPERIDOL DECANOATE 100 MG/ML
1 INJECTION INTRAMUSCULAR EVERY 6 HOURS
Refills: 0 | Status: DISCONTINUED | OUTPATIENT
Start: 2022-07-19 | End: 2022-07-22

## 2022-07-19 RX ORDER — SODIUM CHLORIDE 9 MG/ML
1000 INJECTION INTRAMUSCULAR; INTRAVENOUS; SUBCUTANEOUS ONCE
Refills: 0 | Status: COMPLETED | OUTPATIENT
Start: 2022-07-19 | End: 2022-07-19

## 2022-07-19 RX ADMIN — MIRTAZAPINE 15 MILLIGRAM(S): 45 TABLET, ORALLY DISINTEGRATING ORAL at 21:40

## 2022-07-19 RX ADMIN — GABAPENTIN 300 MILLIGRAM(S): 400 CAPSULE ORAL at 12:39

## 2022-07-19 RX ADMIN — Medication 81 MILLIGRAM(S): at 12:21

## 2022-07-19 RX ADMIN — Medication 650 MILLIGRAM(S): at 06:41

## 2022-07-19 RX ADMIN — GABAPENTIN 300 MILLIGRAM(S): 400 CAPSULE ORAL at 22:27

## 2022-07-19 RX ADMIN — ENOXAPARIN SODIUM 40 MILLIGRAM(S): 100 INJECTION SUBCUTANEOUS at 03:18

## 2022-07-19 RX ADMIN — SODIUM CHLORIDE 250 MILLILITER(S): 9 INJECTION INTRAMUSCULAR; INTRAVENOUS; SUBCUTANEOUS at 12:22

## 2022-07-19 RX ADMIN — SERTRALINE 25 MILLIGRAM(S): 25 TABLET, FILM COATED ORAL at 12:21

## 2022-07-19 RX ADMIN — HALOPERIDOL DECANOATE 1 MILLIGRAM(S): 100 INJECTION INTRAMUSCULAR at 22:28

## 2022-07-19 NOTE — PATIENT PROFILE ADULT - IS THERE A SUSPICION OF ABUSE/NEGLIGENCE?
PRINCIPAL DISCHARGE DIAGNOSIS  Diagnosis: Unilateral primary osteoarthritis, left hip  Assessment and Plan of Treatment: Patient is a 68 yo female history of left hip OA s/p elective LEFT total hip arthroplasty with Dr. Freeman on 7/8/2021 without any intraoperative complications.  Patient is doing well and stable for discharge.  Patient is tolerating physical therapy: weight bearing to left leg, gait training, STRICT POSTERIOR HIP PRECAUTIONS.  Keep dressing on as is until day of staple removal.  Staples/sutures to be removed in Dr. Freeman's office 14 days from date of surgery.  The patient had no post operative complications and clinically progressed faster than anticipated. The following medical conditions were active treated during the hospital stay: HTN, Cardiac/cardiovascular disease, GERD. The patient met criteria for discharge before 2nd night of stay. Patient was safely and appropriately discharged home. Patient is on Aspirin 325mg twice daily for anticoagulation.  Orthopaedic Surgeon Dr. Freeman would like patient to call private MD/Internist for appointment postop to maintain continuity of care.  Follow up with Dr. Freeman office in 1-2 weeks.      
no

## 2022-07-19 NOTE — PATIENT PROFILE ADULT - FALL HARM RISK - HARM RISK INTERVENTIONS
Assistance with ambulation/Assistance OOB with selected safe patient handling equipment/Communicate Risk of Fall with Harm to all staff/Monitor for mental status changes/Move patient closer to nurses' station/Reinforce activity limits and safety measures with patient and family/Reorient to person, place and time as needed/Tailored Fall Risk Interventions/Toileting schedule using arm’s reach rule for commode and bathroom/Use of alarms - bed, chair and/or voice tab/Visual Cue: Yellow wristband and red socks/Bed in lowest position, wheels locked, appropriate side rails in place/Call bell, personal items and telephone in reach/Instruct patient to call for assistance before getting out of bed or chair/Non-slip footwear when patient is out of bed/[place] to call system/Physically safe environment - no spills, clutter or unnecessary equipment/Purposeful Proactive Rounding/Room/bathroom lighting operational, light cord in reach

## 2022-07-20 ENCOUNTER — TRANSCRIPTION ENCOUNTER (OUTPATIENT)
Age: 87
End: 2022-07-20

## 2022-07-20 PROCEDURE — 99232 SBSQ HOSP IP/OBS MODERATE 35: CPT

## 2022-07-20 RX ORDER — QUETIAPINE FUMARATE 200 MG/1
25 TABLET, FILM COATED ORAL AT BEDTIME
Refills: 0 | Status: DISCONTINUED | OUTPATIENT
Start: 2022-07-20 | End: 2022-07-22

## 2022-07-20 RX ORDER — SODIUM CHLORIDE 9 MG/ML
1000 INJECTION INTRAMUSCULAR; INTRAVENOUS; SUBCUTANEOUS
Refills: 0 | Status: DISCONTINUED | OUTPATIENT
Start: 2022-07-20 | End: 2022-07-22

## 2022-07-20 RX ADMIN — SERTRALINE 25 MILLIGRAM(S): 25 TABLET, FILM COATED ORAL at 13:05

## 2022-07-20 RX ADMIN — ENOXAPARIN SODIUM 40 MILLIGRAM(S): 100 INJECTION SUBCUTANEOUS at 05:01

## 2022-07-20 RX ADMIN — GABAPENTIN 300 MILLIGRAM(S): 400 CAPSULE ORAL at 13:05

## 2022-07-20 RX ADMIN — GABAPENTIN 300 MILLIGRAM(S): 400 CAPSULE ORAL at 22:12

## 2022-07-20 RX ADMIN — QUETIAPINE FUMARATE 25 MILLIGRAM(S): 200 TABLET, FILM COATED ORAL at 22:12

## 2022-07-20 RX ADMIN — MIRTAZAPINE 15 MILLIGRAM(S): 45 TABLET, ORALLY DISINTEGRATING ORAL at 22:12

## 2022-07-20 NOTE — DISCHARGE NOTE NURSING/CASE MANAGEMENT/SOCIAL WORK - PATIENT PORTAL LINK FT
You can access the FollowMyHealth Patient Portal offered by [facility] by registering at the following website: [email or web address]. By joining [facility]’s FollowMyHealth portal, you will also be able to view your health information using other applications (apps) compatible with our system.

## 2022-07-20 NOTE — DISCHARGE NOTE NURSING/CASE MANAGEMENT/SOCIAL WORK - NSDCPEFALRISK_GEN_ALL_CORE
For information on Fall & Injury Prevention, visit: [email or web address] OR  [email or web address] OR  https://www.cdc.gov/steadi/patient.html

## 2022-07-21 LAB
ANION GAP SERPL CALC-SCNC: 3 MMOL/L — LOW (ref 5–17)
BUN SERPL-MCNC: 17 MG/DL — SIGNIFICANT CHANGE UP (ref 7–23)
CALCIUM SERPL-MCNC: 8.4 MG/DL — LOW (ref 8.5–10.1)
CHLORIDE SERPL-SCNC: 114 MMOL/L — HIGH (ref 96–108)
CO2 SERPL-SCNC: 26 MMOL/L — SIGNIFICANT CHANGE UP (ref 22–31)
CREAT SERPL-MCNC: 0.98 MG/DL — SIGNIFICANT CHANGE UP (ref 0.5–1.3)
EGFR: 73 ML/MIN/1.73M2 — SIGNIFICANT CHANGE UP
GLUCOSE SERPL-MCNC: 95 MG/DL — SIGNIFICANT CHANGE UP (ref 70–99)
HCT VFR BLD CALC: 35.5 % — LOW (ref 39–50)
HGB BLD-MCNC: 12.3 G/DL — LOW (ref 13–17)
MCHC RBC-ENTMCNC: 33.2 PG — SIGNIFICANT CHANGE UP (ref 27–34)
MCHC RBC-ENTMCNC: 34.6 GM/DL — SIGNIFICANT CHANGE UP (ref 32–36)
MCV RBC AUTO: 95.7 FL — SIGNIFICANT CHANGE UP (ref 80–100)
PLATELET # BLD AUTO: 132 K/UL — LOW (ref 150–400)
POTASSIUM SERPL-MCNC: 3.7 MMOL/L — SIGNIFICANT CHANGE UP (ref 3.5–5.3)
POTASSIUM SERPL-SCNC: 3.7 MMOL/L — SIGNIFICANT CHANGE UP (ref 3.5–5.3)
RBC # BLD: 3.71 M/UL — LOW (ref 4.2–5.8)
RBC # FLD: 12.4 % — SIGNIFICANT CHANGE UP (ref 10.3–14.5)
SODIUM SERPL-SCNC: 143 MMOL/L — SIGNIFICANT CHANGE UP (ref 135–145)
WBC # BLD: 3.64 K/UL — LOW (ref 3.8–10.5)
WBC # FLD AUTO: 3.64 K/UL — LOW (ref 3.8–10.5)

## 2022-07-21 PROCEDURE — 99232 SBSQ HOSP IP/OBS MODERATE 35: CPT

## 2022-07-21 RX ADMIN — SERTRALINE 25 MILLIGRAM(S): 25 TABLET, FILM COATED ORAL at 11:57

## 2022-07-21 RX ADMIN — ENOXAPARIN SODIUM 40 MILLIGRAM(S): 100 INJECTION SUBCUTANEOUS at 05:32

## 2022-07-21 RX ADMIN — HALOPERIDOL DECANOATE 1 MILLIGRAM(S): 100 INJECTION INTRAMUSCULAR at 01:19

## 2022-07-21 RX ADMIN — GABAPENTIN 300 MILLIGRAM(S): 400 CAPSULE ORAL at 11:57

## 2022-07-21 RX ADMIN — GABAPENTIN 300 MILLIGRAM(S): 400 CAPSULE ORAL at 22:18

## 2022-07-21 RX ADMIN — QUETIAPINE FUMARATE 25 MILLIGRAM(S): 200 TABLET, FILM COATED ORAL at 22:18

## 2022-07-21 RX ADMIN — MIRTAZAPINE 15 MILLIGRAM(S): 45 TABLET, ORALLY DISINTEGRATING ORAL at 22:18

## 2022-07-21 NOTE — PHYSICAL THERAPY INITIAL EVALUATION ADULT - GENERAL OBSERVATIONS, REHAB EVAL
pt rec'd in bed supine; denied pain; BP R UE / electronic 147/62
HM; pt rec'd in bed supine; HR 64; pt denied pain; video 1:1 in progress

## 2022-07-21 NOTE — PHYSICAL THERAPY INITIAL EVALUATION ADULT - LEVEL OF INDEPENDENCE: GAIT, REHAB EVAL
transfer/gait training/bed mob 23'/minimum assist (75% patients effort)/moderate assist (50% patients effort)
transfer/gait training/bed mob 10'/minimum assist (75% patients effort)/moderate assist (50% patients effort)

## 2022-07-21 NOTE — PHYSICAL THERAPY INITIAL EVALUATION ADULT - MODALITIES TREATMENT COMMENTS
pt left in bed supine post Re-eval @RN Trina's request; bed alarm on; HM in place; callbell in reach; pt instructed not to get up alone; call nursing for assist; meryl well; denied pain; isolation maintained; video 1:1 in progress
pt left in bed supine post Eval; bed alarm on; HM in place; callbell in reach; pt instructed not to get up alone; call nursing for assist; meryl poorly; pt denied pain

## 2022-07-21 NOTE — PROGRESS NOTE ADULT - SUBJECTIVE AND OBJECTIVE BOX
CC: fever, cough, weakness (19 Jul 2022 20:08)    HPI:    89 yo M with PMHx of dementia, HTN, hyperlipidemia, osteoarthritis, right knee replacement presents with dizziness.  Patient was complaining earlier of feeling weak and dizzy.  Son called to say patient fell and may have hit his head. Does not take any anticoagulation. Patient denies any pain, nausea, vomiting, weakness.  Following all commands pleasant. He tested + for COVID; cased d/w son; ED physician couldn't dc pt due to gen weakness adm for obs, after d/w son decided to r/o cva. After few hrs he started to sundown I will Rx Haldol for agitation.   (18 Jul 2022 10:09)    INTERVAL HPI/ OVERNIGHT EVENTS: Pt is alert, baseline  confused, reports feels  fine,  no SOB, no Cough, denies dizziness. Was OOb ambulated with PT       Vital Signs Last 24 Hrs  T(C): 36.3 (21 Jul 2022 16:30), Max: 36.8 (21 Jul 2022 05:18)  T(F): 97.4 (21 Jul 2022 16:30), Max: 98.2 (21 Jul 2022 05:18)  HR: 56 (21 Jul 2022 16:30) (56 - 65)  BP: 151/68 (21 Jul 2022 16:30) (147/67 - 156/72)  RR: 18 (21 Jul 2022 16:30) (18 - 18)  SpO2: 97% (21 Jul 2022 16:30) (95% - 98%)    Parameters below as of 21 Jul 2022 16:30  Patient On (Oxygen Delivery Method): room air    REVIEW OF SYSTEMS:  Unable to obtain 2/2 dementia     PHYSICAL EXAM:  General: Well developed;  in no acute distress  Eyes: PERRLA, EOMI; conjunctiva and sclera clear  Head: Normocephalic; atraumatic  ENMT: No nasal discharge; airway clear  Neck: Supple; non tender; no masses  Respiratory: No wheezes, rales or rhonchi  Cardiovascular: Regular rate and rhythm. S1 and S2 Normal;   Gastrointestinal: Soft non-tender non-distended; Normal bowel sounds  Genitourinary: No  suprapubic  tenderness  Extremities: No edema  Vascular: Peripheral pulses palpable 2+ bilaterally  Neurological: Alert and oriented x4  Musculoskeletal: Normal muscle tone, without deformities  Psychiatric: Cooperative and appropriate      LABS:                         12.3   3.64  )-----------( 132      ( 21 Jul 2022 11:03 )             35.5     07-21    143  |  114<H>  |  17  ----------------------------<  95  3.7   |  26  |  0.98    Ca    8.4<L>      21 Jul 2022 11:03      19 Jul 2022 07:15    140    |  109    |  24     ----------------------------<  85     3.7     |  24     |  1.06     Ca    8.9        19 Jul 2022 07:15        MEDICATIONS  (STANDING):  benzocaine 15 mG/menthol 3.6 mG Lozenge 1 Lozenge Oral once  enoxaparin Injectable 40 milliGRAM(s) SubCutaneous every 24 hours  gabapentin 300 milliGRAM(s) Oral two times a day  mirtazapine 15 milliGRAM(s) Oral at bedtime  QUEtiapine 25 milliGRAM(s) Oral at bedtime  sertraline 25 milliGRAM(s) Oral daily  sodium chloride 0.9%. 1000 milliLiter(s) (75 mL/Hr) IV Continuous <Continuous>    MEDICATIONS  (PRN):  bisacodyl Suppository 10 milliGRAM(s) Rectal at bedtime PRN Constipation  haloperidol    Injectable 1 milliGRAM(s) IntraMuscular every 6 hours PRN Agitation    RADIOLOGY & ADDITIONAL TESTS:  < from: CT Cervical Spine No Cont (07.18.22 @ 02:05) >    ACC: [id number] EXAM:  CT CERVICAL SPINE                        ACC: [id number] EXAM:  CT BRAIN                          PROCEDURE DATE:  07/18/2022          INTERPRETATION:  CLINICAL INFORMATION: Altered mental status/dizziness.   Fall.    TECHNIQUE: Multiple axial CT images of the calvarium and brain were   obtained without contrast. Sagittal and coronal reformats were obtained.    COMPARISON: CT head 11/2/2021      TECHNIQUE:  Axial CT images were acquired through the head and cervical spine.  Intravenous contrast: None  Sagittal and coronal reformats were generated.    COMPARISON STUDY: None    FINDINGS:  CT head:  There is no mass effect, intracranial hemorrhage, or midline shift.    There is no CT evidence of acute territorial infarct.    The ventricles and sulci show a mild to moderate degree of atrophy.  Periventricular white matter hypoattenuation is likely sequela from   microvascular ischemic change.    Opacification of the right maxillary sinus. Mucosal thickening in the   left maxillary sinus. Bilateral ethmoid mucosal thickening. The imaged   portions of the paranasal sinuses and mastoids are otherwise well aerated.    There is no osseous abnormality.  Stable appearance to right eye/globe deformity likely from prior trauma.      CT cervical spine:  There is no evidence of fracture or subluxation. Straightening of the   normal cervical lordosis which may be positional or secondary to muscle   spasm. The prevertebral soft tissues are unremarkable.    There are multileveldegenerative changes characterized by disc   osteophyte complexes and facet and uncinate hypertrophy. This results in   mild canal stenosis and multilevel neural foraminal narrowing. Diffuse   disc height loss throughout. Vertebral bodies maintain their height.   Ankylosis of C5/6.    The visualized intracranial structures are unremarkable.  The soft   tissues of the neck are grossly normal.  The lung apices are clear.      IMPRESSION:    CT Head:  No hemorrhage or other acute finding    CT Cervical Spine: No acute fracture or subluxation    < from: Xray Chest 1 View- PORTABLE-Urgent (07.18.22 @ 00:38) >    ACC: [id number] EXAM:  XR CHEST PORTABLE URGENT 1V                          PROCEDURE DATE:  07/18/2022          INTERPRETATION:  AP erect chest on July 18, 2022 at 12:23 AM. Patient has   sepsis.    Heart magnified by technique.    On April 25 of this year there was dense atelectasis in the right midlung   field.    Presently lungs are clear.    IMPRESSION: Clear lungs at this time.      
HOSPITALIST ATTENDING PROGRESS NOTE    Chart and meds reviewed.  Patient seen and examined.     CC: weakness    Subjective: Pt denies complaints, Orthostatics quite positive w standing, IVF bolus ordered. Updated daughter who reports pt was definitely dehydrated prior to admission.    All other systems reviewed and found to be negative with the exception of what has been described above.    MEDICATIONS  (STANDING):  benzocaine 15 mG/menthol 3.6 mG Lozenge 1 Lozenge Oral once  enoxaparin Injectable 40 milliGRAM(s) SubCutaneous every 24 hours  gabapentin 300 milliGRAM(s) Oral two times a day  mirtazapine 15 milliGRAM(s) Oral at bedtime  sertraline 25 milliGRAM(s) Oral daily    MEDICATIONS  (PRN):  haloperidol    Injectable 1 milliGRAM(s) IV Push every 6 hours PRN agitation      VITALS:  T(F): 97.7 (22 @ 15:49), Max: 100.4 (22 @ 05:53)  HR: 101 (22 @ 15:49) (68 - 101)  BP: 139/64 (22 @ 15:49) (127/56 - 145/61)  RR: 18 (22 @ 15:49) (18 - 19)  SpO2: 95% (22 @ 15:49) (94% - 95%)  Wt(kg): --    I&O's Summary      CAPILLARY BLOOD GLUCOSE      POCT Blood Glucose.: 96 mg/dL (2022 07:59)      PHYSICAL EXAM:  Gen: NAD  HEENT:  pupils equal and reactive, EOMI, no oropharyngeal lesions, erythema, exudates, oral thrush  NECK:   supple, no carotid bruits, no palpable lymph nodes, no thyromegaly  CV:  +S1, +S2, regular, no murmurs or rubs  RESP:   lungs clear to auscultation bilaterally, no wheezing, rales, rhonchi, good air entry bilaterally  BREAST:  not examined  GI:  abdomen soft, non-tender, non-distended, normal BS, no bruits, no abdominal masses, no palpable masses  RECTAL:  not examined  :  not examined  MSK:   normal muscle tone, no atrophy, no rigidity, no contractions  EXT:  no clubbing, no cyanosis, no edema, no calf pain, swelling or erythema  VASCULAR:  pulses equal and symmetric in the upper and lower extremities  NEURO:  AAOX3, no focal neurological deficits, follows all commands, able to move extremities spontaneously  SKIN:  no ulcers, lesions or rashes    LABS:                            12.5   5.99  )-----------( 144      ( 2022 23:59 )             37.0     07-    140  |  109<H>  |  24<H>  ----------------------------<  85  3.7   |  24  |  1.06    Ca    8.9      2022 07:15    TPro  7.3  /  Alb  3.7  /  TBili  0.9  /  DBili  x   /  AST  13<L>  /  ALT  15  /  AlkPhos  58  07-17        LIVER FUNCTIONS - ( 2022 23:59 )  Alb: 3.7 g/dL / Pro: 7.3 gm/dL / ALK PHOS: 58 U/L / ALT: 15 U/L / AST: 13 U/L / GGT: x           PT/INR - ( 2022 23:59 )   PT: 13.2 sec;   INR: 1.14 ratio         PTT - ( 2022 23:59 )  PTT:28.8 sec  Urinalysis Basic - ( 2022 08:10 )    Color: Yellow / Appearance: Clear / S.010 / pH: x  Gluc: x / Ketone: Negative  / Bili: Negative / Urobili: Negative   Blood: x / Protein: Negative / Nitrite: Negative   Leuk Esterase: Negative / RBC: x / WBC x   Sq Epi: x / Non Sq Epi: x / Bacteria: x    CULTURES:  COVID +  BCx  NG  UCx  <10K    Additional results/Imaging, I have personally reviewed:  CXR 22: clear    CTH, CT cspine 22: CT Head:  No hemorrhage or other acute finding CT Cervical Spine: No acute fracture or subluxation    Echo 22: The aortic valve is well visualized, appears calcified. Valve opening seems to be normal. Mild (1+) aortic regurgitation is present. Normal appearing left atrium. The left ventricle is normal in size, wall thickness, wall motion and contractility. Estimated left ventricular ejection fraction is >60 %. The IVC appears normal. The mitral valve leaflets appear calcified. EA reversal of the mitral inflow consistent with reduced compliance of the left ventricle. Mild (1+) mitral regurgitation is present. No evidence of pericardial effusion. No evidence of pleural effusion. Pulmonic valve not well seen, probably normal pulmonic valve function. Normal appearing right atrium. Normal appearing right ventricle structure and function. Normal appearing tricuspid valve structure and function.    Telemetry, personally reviewed:  22: sinus 50-70
CC: fever, cough, weakness (19 Jul 2022 20:08)    HPI:    89 yo M with PMHx of dementia, HTN, hyperlipidemia, osteoarthritis, right knee replacement presents with dizziness.  Patient was complaining earlier of feeling weak and dizzy.  Son called to say patient fell and may have hit his head. Does not take any anticoagulation. Patient denies any pain, nausea, vomiting, weakness.  Following all commands pleasant. He tested + for COVID; cased d/w son; ED physician couldn't dc pt due to gen weakness adm for obs, after d/w son decided to r/o cva. After few hrs he started to sundown I will Rx Haldol for agitation.   (18 Jul 2022 10:09)    INTERVAL HPI/OVERNIGHT EVENTS: chart reviewed Pt is confused, but reports feels better, no SOB, no Cough, unable to provide full history 2/2 dementia. Awaiting for PT     Vital Signs Last 24 Hrs  T(C): 36.9 (20 Jul 2022 16:44), Max: 37.2 (20 Jul 2022 05:04)  T(F): 98.5 (20 Jul 2022 16:44), Max: 99 (20 Jul 2022 05:04)  HR: 57 (20 Jul 2022 16:44) (57 - 66)  BP: 130/64 (20 Jul 2022 16:44) (122/89 - 165/77)  RR: 18 (20 Jul 2022 16:44) (18 - 18)  SpO2: 94% (20 Jul 2022 16:44) (94% - 98%)    Parameters below as of 20 Jul 2022 16:44  Patient On (Oxygen Delivery Method): room air          REVIEW OF SYSTEMS:    Unable to obtain 2/2 dementia     PHYSICAL EXAM:  General: Well developed;  in no acute distress  Eyes: PERRLA, EOMI; conjunctiva and sclera clear  Head: Normocephalic; atraumatic  ENMT: No nasal discharge; airway clear  Neck: Supple; non tender; no masses  Respiratory: No wheezes, rales or rhonchi  Cardiovascular: Regular rate and rhythm. S1 and S2 Normal;   Gastrointestinal: Soft non-tender non-distended; Normal bowel sounds  Genitourinary: No  suprapubic  tenderness  Extremities: No edema  Vascular: Peripheral pulses palpable 2+ bilaterally  Neurological: Alert and oriented x4  Musculoskeletal: Normal muscle tone, without deformities  Psychiatric: Cooperative and appropriate      LABS:     19 Jul 2022 07:15    140    |  109    |  24     ----------------------------<  85     3.7     |  24     |  1.06     Ca    8.9        19 Jul 2022 07:15        MEDICATIONS  (STANDING):  benzocaine 15 mG/menthol 3.6 mG Lozenge 1 Lozenge Oral once  enoxaparin Injectable 40 milliGRAM(s) SubCutaneous every 24 hours  gabapentin 300 milliGRAM(s) Oral two times a day  mirtazapine 15 milliGRAM(s) Oral at bedtime  QUEtiapine 25 milliGRAM(s) Oral at bedtime  sertraline 25 milliGRAM(s) Oral daily    MEDICATIONS  (PRN):  bisacodyl Suppository 10 milliGRAM(s) Rectal at bedtime PRN Constipation  haloperidol    Injectable 1 milliGRAM(s) IntraMuscular every 6 hours PRN Agitation      RADIOLOGY & ADDITIONAL TESTS:  < from: CT Cervical Spine No Cont (07.18.22 @ 02:05) >    ACC: [id number] EXAM:  CT CERVICAL SPINE                        ACC: [id number] EXAM:  CT BRAIN                          PROCEDURE DATE:  07/18/2022          INTERPRETATION:  CLINICAL INFORMATION: Altered mental status/dizziness.   Fall.    TECHNIQUE: Multiple axial CT images of the calvarium and brain were   obtained without contrast. Sagittal and coronal reformats were obtained.    COMPARISON: CT head 11/2/2021      TECHNIQUE:  Axial CT images were acquired through the head and cervical spine.  Intravenous contrast: None  Sagittal and coronal reformats were generated.    COMPARISON STUDY: None    FINDINGS:  CT head:  There is no mass effect, intracranial hemorrhage, or midline shift.    There is no CT evidence of acute territorial infarct.    The ventricles and sulci show a mild to moderate degree of atrophy.  Periventricular white matter hypoattenuation is likely sequela from   microvascular ischemic change.    Opacification of the right maxillary sinus. Mucosal thickening in the   left maxillary sinus. Bilateral ethmoid mucosal thickening. The imaged   portions of the paranasal sinuses and mastoids are otherwise well aerated.    There is no osseous abnormality.  Stable appearance to right eye/globe deformity likely from prior trauma.      CT cervical spine:  There is no evidence of fracture or subluxation. Straightening of the   normal cervical lordosis which may be positional or secondary to muscle   spasm. The prevertebral soft tissues are unremarkable.    There are multileveldegenerative changes characterized by disc   osteophyte complexes and facet and uncinate hypertrophy. This results in   mild canal stenosis and multilevel neural foraminal narrowing. Diffuse   disc height loss throughout. Vertebral bodies maintain their height.   Ankylosis of C5/6.    The visualized intracranial structures are unremarkable.  The soft   tissues of the neck are grossly normal.  The lung apices are clear.      IMPRESSION:    CT Head:  No hemorrhage or other acute finding    CT Cervical Spine: No acute fracture or subluxation    < from: Xray Chest 1 View- PORTABLE-Urgent (07.18.22 @ 00:38) >    ACC: [id number] EXAM:  XR CHEST PORTABLE URGENT 1V                          PROCEDURE DATE:  07/18/2022          INTERPRETATION:  AP erect chest on July 18, 2022 at 12:23 AM. Patient has   sepsis.    Heart magnified by technique.    On April 25 of this year there was dense atelectasis in the right midlung   field.    Presently lungs are clear.    IMPRESSION: Clear lungs at this time.

## 2022-07-21 NOTE — PHYSICAL THERAPY INITIAL EVALUATION ADULT - LEVEL OF INDEPENDENCE: SIT/STAND, REHAB EVAL
minimum assist (75% patients effort)/moderate assist (50% patients effort)
BP 85/41; RN Perla made aware verbally immediately post Eval/moderate assist (50% patients effort)

## 2022-07-21 NOTE — PHYSICAL THERAPY INITIAL EVALUATION ADULT - PHYSICAL ASSIST/NONPHYSICAL ASSIST: GAIT, REHAB EVAL
verbal/tactile cues for proper use of RW/1 person assist
tactile cues for sequencing & proper use of RW/verbal cues/1 person assist

## 2022-07-21 NOTE — PROGRESS NOTE ADULT - ASSESSMENT
89 yo M with PMHx of dementia, HTN, hyperlipidemia, osteoarthritis, right knee replacement presents with dizziness, found to have COVID 19 infection and orthostatic hypotension.    #Weakness/dizziness- in setting of COVID w dehydration causing orthostasis  -tele neg  -orthostatics still +, will give gentle Hydration   -trops neg  -echo as above, diastolic dysfunction only  -will not pursue MRI, CVA unlikely cause of sx  -PT eval    #COVID19  -not hypoxic, CXR neg supportive care    #Dementia w behavioral disturbance (sundowning)  -home meds  - will add Seroquel QHS     #HTN  -lasix held given dehydration    #DVT ppx- SQL    Dispo; IVF  overnight,  PT pending, d/c planning when orthostatics improved 
89 yo M with PMHx of dementia, HTN, hyperlipidemia, osteoarthritis, right knee replacement presents with dizziness, found to have COVID 19 infection and orthostatic hypotension.    #Weakness/dizziness- in setting of COVID w dehydration causing orthostasis  -tele neg  -orthostatics still  but much improved, Pt is asymptomatic   -trops neg  -echo:  diastolic dysfunction only, preserve EF, mild valvular Dz  -will not pursue MRI, CVA unlikely cause of sx  -PT eval    #COVID19  -not hypoxic, CXR neg supportive care    #Dementia w behavioral disturbance (sundowning)  -home meds  - will add Seroquel QHS     #HTN  -lasix held given dehydration    PT eval appreciated, recommends home with assistance vs [facility]    Dispo: d/c planning d/w Pts daughter and case management. D/c tele 
89 yo M with PMHx of dementia, HTN, hyperlipidemia, osteoarthritis, right knee replacement presents with dizziness, found to have COVID 19 infection and orthostatic hypotension.    #Weakness/dizziness- in setting of COVID w dehydration causing orthostasis  -tele neg  -orthostatics +, bolus and repeat  -trops neg  -echo as above, diastolic dysfunction only  -will not pursue MRI, CVA unlikely cause of sx  -PT eval    #COVID19  -not hypoxic, supportive care    #Dementia w behavioral disturbance (sundowning)  -home meds    #HTN  -lasix held given dehydration    #DVT ppx- SQL    #d/c pending resolution of orthostasis, will need to coordinate w Brenda (daughter) [phone] as his family members/care givers also now have COVID. Daughter updated today

## 2022-07-21 NOTE — PHYSICAL THERAPY INITIAL EVALUATION ADULT - CRITERIA FOR SKILLED THERAPEUTIC INTERVENTIONS
impairments found
will hold further PT intervention @ this time due to + orthostatic hypotension; will await future orders when PT appropriate; JONI Duncan made aware

## 2022-07-22 ENCOUNTER — TRANSCRIPTION ENCOUNTER (OUTPATIENT)
Age: 87
End: 2022-07-22

## 2022-07-22 VITALS
OXYGEN SATURATION: 95 % | DIASTOLIC BLOOD PRESSURE: 60 MMHG | HEART RATE: 69 BPM | SYSTOLIC BLOOD PRESSURE: 140 MMHG | TEMPERATURE: 98 F | RESPIRATION RATE: 18 BRPM

## 2022-07-22 PROCEDURE — 99239 HOSP IP/OBS DSCHRG MGMT >30: CPT

## 2022-07-22 RX ORDER — QUETIAPINE FUMARATE 200 MG/1
1 TABLET, FILM COATED ORAL
Qty: 30 | Refills: 0
Start: 2022-07-22 | End: 2022-08-20

## 2022-07-22 RX ORDER — FUROSEMIDE 40 MG
1 TABLET ORAL
Qty: 0 | Refills: 0 | DISCHARGE

## 2022-07-22 RX ADMIN — HALOPERIDOL DECANOATE 1 MILLIGRAM(S): 100 INJECTION INTRAMUSCULAR at 04:52

## 2022-07-22 RX ADMIN — ENOXAPARIN SODIUM 40 MILLIGRAM(S): 100 INJECTION SUBCUTANEOUS at 02:26

## 2022-07-22 RX ADMIN — GABAPENTIN 300 MILLIGRAM(S): 400 CAPSULE ORAL at 09:13

## 2022-07-22 RX ADMIN — SERTRALINE 25 MILLIGRAM(S): 25 TABLET, FILM COATED ORAL at 09:12

## 2022-07-22 NOTE — DISCHARGE NOTE PROVIDER - NSDCMRMEDTOKEN_GEN_ALL_CORE_FT
gabapentin 300 mg oral capsule: 2 cap(s) orally once a day (at bedtime)  mirtazapine 15 mg oral tablet: 1 tab(s) orally once a day (at bedtime)  QUEtiapine 25 mg oral tablet: 1 tab(s) orally once a day (at bedtime), As Needed -for agitation   sertraline 25 mg oral tablet: 1 tab(s) orally once a day  Vitamin D3 25 mcg (1000 intl units) oral tablet: 1 tab(s) orally once a day

## 2022-07-22 NOTE — DISCHARGE NOTE PROVIDER - CARE PROVIDERS DIRECT ADDRESSES
[email or web address]

## 2022-07-22 NOTE — DISCHARGE NOTE PROVIDER - NSDCCPCAREPLAN_GEN_ALL_CORE_FT
PRINCIPAL DISCHARGE DIAGNOSIS  Diagnosis: 2019 novel coronavirus disease (COVID-19)  Assessment and Plan of Treatment: asymptomatic      SECONDARY DISCHARGE DIAGNOSES  Diagnosis: Orthostatic hypotension  Assessment and Plan of Treatment: S/p IV Fluids  Hold lasix    Diagnosis: Dehydration  Assessment and Plan of Treatment: oral hydration    Diagnosis: HTN (hypertension)  Assessment and Plan of Treatment: do not take lasix  F/u with PCP next week to check BP

## 2022-07-22 NOTE — DISCHARGE NOTE PROVIDER - HOSPITAL COURSE
89 yo M with PMHx of dementia, HTN, hyperlipidemia, osteoarthritis, right knee replacement presented  with dizziness.  Patient was complaining earlier of feeling weak and dizzy.  Son called to say patient fell and may have hit his head. Does not take any anticoagulation. Patient denied  any pain, nausea, vomiting, weakness.  He tested + for COVID;  Pt was admitted for further evaluation. CXR no PNA, Pt asymptomatic for COVID, on RA. Pt was found to be orthostatic and Dehydrated, S/p IVF, now Orthostatics neg, no dizziness. pt was evaluated by PT, home with home PT and assistance recommended. Pt with baseline dementia, sundowning at night, as per family not new, Pt had aid  overnight.   Today Pt awake, alert, pleasantly confused. Reports no dizziness. No Difficulty breathing, no pain     Vital Signs Last 24 Hrs  T(C): 36.9 (22 Jul 2022 10:30), Max: 37 (21 Jul 2022 21:25)  T(F): 98.5 (22 Jul 2022 10:30), Max: 98.6 (21 Jul 2022 21:25)  HR: 85 (22 Jul 2022 10:30) (65 - 85)  BP: 153/60 (21 Jul 2022 21:25) (153/60 - 153/60)  RR: 18 (21 Jul 2022 21:25) (18 - 18)  SpO2: 98% (22 Jul 2022 10:30) (98% - 98%)    Parameters below as of 22 Jul 2022 10:30  Patient On (Oxygen Delivery Method): room air      PHYSICAL EXAM:  General: Well developed;  in no acute distress  Eyes: PERRLA, EOMI; conjunctiva and sclera clear  Head: Normocephalic; atraumatic  ENMT: No nasal discharge; airway clear  Neck: Supple; non tender; no masses  Respiratory: No wheezes, rales or rhonchi  Cardiovascular: Regular rate and rhythm. S1 and S2 Normal;   Gastrointestinal: Soft non-tender non-distended; Normal bowel sounds  Genitourinary: No  suprapubic  tenderness  Extremities: No edema  Vascular: Peripheral pulses palpable 2+ bilaterally  Neurological: Alert and oriented x4  Musculoskeletal: Normal muscle tone, without deformities  Psychiatric: Cooperative and appropriate    A/P:  89 yo M with PMHx of dementia, HTN, hyperlipidemia, osteoarthritis, right knee replacement admitted for:       # Weakness/dizziness- in setting of COVID w dehydration causing orthostasis  -tele neg  -orthostatics improved, Pt is asymptomatic   -trops neg  -echo:  diastolic dysfunction only, preserve EF, mild valvular Dz  -will not pursue MRI, CVA unlikely cause of sx  -PT eval    #COVID19  -not hypoxic, CXR neg supportive care    # Dementia w behavioral disturbance (sundowning), not new   - c/w home meds   - PRN  Seroquel QHS     #HTN  -lasix held given dehydration  - BP elevated thi am but improved later  - F/u with PCP to recheck BP     Dispo:  stable for d/c home with HC   Fax d/c summary to PCP  Total time 40 min

## 2022-07-22 NOTE — DISCHARGE NOTE PROVIDER - CARE PROVIDER_API CALL
Tashi Akbar (DO)  Medicine  [facility]  [address], [address]  [place] [address]  Phone: [phone]  Fax: [phone]  Follow Up Time: 1 week

## 2022-07-25 ENCOUNTER — NON-APPOINTMENT (OUTPATIENT)
Age: 87
End: 2022-07-25

## 2022-08-01 DIAGNOSIS — F03.91 UNSPECIFIED DEMENTIA WITH BEHAVIORAL DISTURBANCE: ICD-10-CM

## 2022-08-01 DIAGNOSIS — Z90.89 ACQUIRED ABSENCE OF OTHER ORGANS: ICD-10-CM

## 2022-08-01 DIAGNOSIS — Z96.651 PRESENCE OF RIGHT ARTIFICIAL KNEE JOINT: ICD-10-CM

## 2022-08-01 DIAGNOSIS — F32.A DEPRESSION, UNSPECIFIED: ICD-10-CM

## 2022-08-01 DIAGNOSIS — E86.0 DEHYDRATION: ICD-10-CM

## 2022-08-01 DIAGNOSIS — E78.5 HYPERLIPIDEMIA, UNSPECIFIED: ICD-10-CM

## 2022-08-01 DIAGNOSIS — R94.31 ABNORMAL ELECTROCARDIOGRAM [ECG] [EKG]: ICD-10-CM

## 2022-08-01 DIAGNOSIS — I10 ESSENTIAL (PRIMARY) HYPERTENSION: ICD-10-CM

## 2022-08-01 DIAGNOSIS — F41.9 ANXIETY DISORDER, UNSPECIFIED: ICD-10-CM

## 2022-08-01 DIAGNOSIS — F05 DELIRIUM DUE TO KNOWN PHYSIOLOGICAL CONDITION: ICD-10-CM

## 2022-08-01 DIAGNOSIS — I95.1 ORTHOSTATIC HYPOTENSION: ICD-10-CM

## 2022-08-01 DIAGNOSIS — U07.1 COVID-19: ICD-10-CM

## 2022-09-20 ENCOUNTER — RX RENEWAL (OUTPATIENT)
Age: 87
End: 2022-09-20

## 2022-10-21 NOTE — ASSESSMENT
[FreeTextEntry1] : #1 HM: And wife are refusing pneumococcal 23 and Tdap vaccinations.  He will have flu vaccination in fall.  For fasting blood work.\par \par #2 Dementia.\par \par #3 BPH.\par \par #4 HTN.  Metoprolol was recently discontinued because of low blood pressure.\par \par #5 Depression. \par \par For return visit in 6 months or on a as needed basis.\par \par We will obtain important parts of medical records from previous PCP. 97.8

## 2022-11-21 ENCOUNTER — RX RENEWAL (OUTPATIENT)
Age: 87
End: 2022-11-21

## 2022-11-30 ENCOUNTER — RX RENEWAL (OUTPATIENT)
Age: 87
End: 2022-11-30

## 2023-01-26 ENCOUNTER — RX RENEWAL (OUTPATIENT)
Age: 88
End: 2023-01-26

## 2023-02-27 ENCOUNTER — RX RENEWAL (OUTPATIENT)
Age: 88
End: 2023-02-27

## 2023-03-31 ENCOUNTER — RX RENEWAL (OUTPATIENT)
Age: 88
End: 2023-03-31

## 2023-05-08 ENCOUNTER — RX RENEWAL (OUTPATIENT)
Age: 88
End: 2023-05-08

## 2023-06-05 ENCOUNTER — APPOINTMENT (OUTPATIENT)
Dept: GERIATRICS | Facility: CLINIC | Age: 88
End: 2023-06-05
Payer: MEDICARE

## 2023-06-05 VITALS
WEIGHT: 185 LBS | TEMPERATURE: 97.3 F | DIASTOLIC BLOOD PRESSURE: 71 MMHG | BODY MASS INDEX: 27.4 KG/M2 | SYSTOLIC BLOOD PRESSURE: 149 MMHG | HEIGHT: 69 IN | OXYGEN SATURATION: 99 % | RESPIRATION RATE: 14 BRPM | HEART RATE: 56 BPM

## 2023-06-05 DIAGNOSIS — R79.89 OTHER SPECIFIED ABNORMAL FINDINGS OF BLOOD CHEMISTRY: ICD-10-CM

## 2023-06-05 DIAGNOSIS — R26.81 UNSTEADINESS ON FEET: ICD-10-CM

## 2023-06-05 DIAGNOSIS — R73.01 IMPAIRED FASTING GLUCOSE: ICD-10-CM

## 2023-06-05 DIAGNOSIS — H10.9 UNSPECIFIED CONJUNCTIVITIS: ICD-10-CM

## 2023-06-05 DIAGNOSIS — I10 ESSENTIAL (PRIMARY) HYPERTENSION: ICD-10-CM

## 2023-06-05 DIAGNOSIS — H10.402 UNSPECIFIED CHRONIC CONJUNCTIVITIS, LEFT EYE: ICD-10-CM

## 2023-06-05 PROCEDURE — 99214 OFFICE O/P EST MOD 30 MIN: CPT

## 2023-06-05 RX ORDER — GABAPENTIN 300 MG/1
300 CAPSULE ORAL
Qty: 60 | Refills: 3 | Status: DISCONTINUED | COMMUNITY
Start: 2021-11-29 | End: 2023-06-05

## 2023-06-05 RX ORDER — FUROSEMIDE 20 MG/1
20 TABLET ORAL DAILY
Refills: 0 | Status: DISCONTINUED | COMMUNITY
Start: 2022-05-09 | End: 2023-06-05

## 2023-06-05 NOTE — REVIEW OF SYSTEMS
[Feeling Poorly] : not feeling poorly [Feeling Tired] : not feeling tired [Palpitations] : no palpitations [Cough] : no cough [SOB on Exertion] : no shortness of breath during exertion [Abdominal Pain] : no abdominal pain [Constipation] : no constipation [Diarrhea] : no diarrhea [Dysuria] : no dysuria [Dizziness] : no dizziness [Skin Lesions] : no skin lesions [FreeTextEntry1] : limited ROS d/t dx of dementia

## 2023-06-05 NOTE — HISTORY OF PRESENT ILLNESS
[With Patient/Caregiver] : , with patient/caregiver [I will adhere to the patient's wishes.] : I will adhere to the patient's wishes. [FreeTextEntry1] : JANET DALLAS is a 89 year old man with a PMH of advanced dementia, HTN, insomnia, and gait instability who presents for follow up visit. Patient is accompanied by daughter, Renetta, who provided collateral history.\par \par Since last visit, patient was started on over the counter medication "relaxium" and daughter reported pt is sleeping much better. He gets up less frequently. \par \par He continues to attend day care programs 5 days a week and her brother has become their main caretaker. They have hired a HHA at night from 10PM to 6AM. They have noticed pt has become more debilitated. \par \par Insomnia:\par -continues\par -daughter states that patient sleeps throughout the night 1-2 times per week but more often than not, will not fall asleep until 4am and will then sleep until 11am \par -currently taking mirtazipine 15mg, gabapentin 600mg at bedtime and relaxium x 1 tablet \par -patient also has HHAs nightly from 10pm-6am\par \par Leg weakness:\par -trying to go for walks with walker; daughter states that patient requires a lot of encouragement to engage in activity\par -patient went swimming recently at daughter's pool \par \par Dysphagia:\par -occasionally coughing with fluids\par -unclear pneumonia in April was s/t aspiration\par -evaluated at bedside by SLP while in hospital but did not have a formal swallow evaluation\par \par Dementia:\par -sundowning behaviors stable \par -previously went to adult day center 4 times a week\par \par  [No falls in past year] : Patient reported no falls in the past year [Completely Dependent] : Completely dependent. [0] : 2) Feeling down, depressed, or hopeless: Not at all (0) [AdvancecareDate] : 05/22 [FreeTextEntry4] : daughter Renetta states that she has HCP form- she will obtain copy and bring to next visit\par -no MOLST form completed; briefly discussed patient's wishes given recent hospitalization; Renetta states "I think he would wanted all aggressive measures as long as it were reversible but I am not sure and I need to check his living will"- plan to address at follow up visit

## 2023-06-05 NOTE — REASON FOR VISIT
[Follow-Up] : a follow-up visit [Family Member] : family member [FreeTextEntry1] : follow up [FreeTextEntry3] : daughter, Renetta

## 2023-06-05 NOTE — PHYSICAL EXAM
[Alert] : alert [Normal Outer Ear/Nose] : the ears and nose were normal in appearance [Both Tympanic Membranes Were Examined] : both tympanic membranes were normal [Normal Appearance] : the appearance of the neck was normal [Supple] : the neck was supple [No Respiratory Distress] : no respiratory distress [No Acc Muscle Use] : no accessory muscle use [Respiration, Rhythm And Depth] : normal respiratory rhythm and effort [Auscultation Breath Sounds / Voice Sounds] : lungs were clear to auscultation bilaterally [Normal S1, S2] : normal S1 and S2 [Heart Rate And Rhythm] : heart rate was normal and rhythm regular [Edema] : edema was not present [Bowel Sounds] : normal bowel sounds [Abdomen Tenderness] : non-tender [Cervical Lymph Nodes Enlarged Posterior Bilaterally] : posterior cervical [Abdomen Soft] : soft [Supraclavicular Lymph Nodes Enlarged Bilaterally] : supraclavicular [Cervical Lymph Nodes Enlarged Anterior Bilaterally] : anterior cervical, supraclavicular [Axillary Lymph Nodes Enlarged Bilaterally] : axillary [No CVA Tenderness] : no CVA  tenderness [No Spinal Tenderness] : no spinal tenderness [Motor Tone] : muscle strength and tone were normal [Normal Color / Pigmentation] : normal skin color and pigmentation [Normal Affect] : the affect was normal [Normal Mood] : the mood was normal [Normal Gait] : abnormal gait [Normal Insight/Judgment] : insight and judgment were not intact [de-identified] : well appearing; answers questions appropriately [FreeTextEntry1] : right eye patch in place

## 2023-06-06 LAB
25(OH)D3 SERPL-MCNC: 31.1 NG/ML
ALBUMIN SERPL ELPH-MCNC: 4.2 G/DL
ALP BLD-CCNC: 71 U/L
ALT SERPL-CCNC: 11 U/L
ANION GAP SERPL CALC-SCNC: 10 MMOL/L
AST SERPL-CCNC: 20 U/L
BILIRUB SERPL-MCNC: 0.7 MG/DL
BUN SERPL-MCNC: 29 MG/DL
CALCIUM SERPL-MCNC: 9.5 MG/DL
CHLORIDE SERPL-SCNC: 105 MMOL/L
CO2 SERPL-SCNC: 28 MMOL/L
CREAT SERPL-MCNC: 1.3 MG/DL
EGFR: 52 ML/MIN/1.73M2
ESTIMATED AVERAGE GLUCOSE: 114 MG/DL
FOLATE SERPL-MCNC: >20 NG/ML
GLUCOSE SERPL-MCNC: 66 MG/DL
HBA1C MFR BLD HPLC: 5.6 %
POTASSIUM SERPL-SCNC: 5.3 MMOL/L
PROT SERPL-MCNC: 6.7 G/DL
SODIUM SERPL-SCNC: 143 MMOL/L
TSH SERPL-ACNC: 1.9 UIU/ML
VIT B12 SERPL-MCNC: 605 PG/ML

## 2023-07-10 ENCOUNTER — RX RENEWAL (OUTPATIENT)
Age: 88
End: 2023-07-10

## 2023-07-12 ENCOUNTER — INPATIENT (INPATIENT)
Facility: HOSPITAL | Age: 88
LOS: 3 days | Discharge: HOME HEALTH | DRG: 71 | End: 2023-07-16
Attending: HOSPITALIST | Admitting: INTERNAL MEDICINE
Payer: MEDICARE

## 2023-07-12 VITALS — WEIGHT: 199.96 LBS

## 2023-07-12 DIAGNOSIS — R41.82 ALTERED MENTAL STATUS, UNSPECIFIED: ICD-10-CM

## 2023-07-12 DIAGNOSIS — Z96.651 PRESENCE OF RIGHT ARTIFICIAL KNEE JOINT: Chronic | ICD-10-CM

## 2023-07-12 DIAGNOSIS — Z90.89 ACQUIRED ABSENCE OF OTHER ORGANS: Chronic | ICD-10-CM

## 2023-07-12 LAB
ALBUMIN SERPL ELPH-MCNC: 3.1 G/DL — LOW (ref 3.3–5)
ALP SERPL-CCNC: 82 U/L — SIGNIFICANT CHANGE UP (ref 40–120)
ALT FLD-CCNC: 40 U/L — SIGNIFICANT CHANGE UP (ref 12–78)
ANION GAP SERPL CALC-SCNC: 2 MMOL/L — LOW (ref 5–17)
APPEARANCE UR: ABNORMAL
APTT BLD: 31.1 SEC — SIGNIFICANT CHANGE UP (ref 27.5–35.5)
AST SERPL-CCNC: 37 U/L — SIGNIFICANT CHANGE UP (ref 15–37)
BACTERIA # UR AUTO: ABNORMAL
BASOPHILS # BLD AUTO: 0.02 K/UL — SIGNIFICANT CHANGE UP (ref 0–0.2)
BASOPHILS NFR BLD AUTO: 0.3 % — SIGNIFICANT CHANGE UP (ref 0–2)
BILIRUB SERPL-MCNC: 0.7 MG/DL — SIGNIFICANT CHANGE UP (ref 0.2–1.2)
BILIRUB UR-MCNC: NEGATIVE — SIGNIFICANT CHANGE UP
BUN SERPL-MCNC: 20 MG/DL — SIGNIFICANT CHANGE UP (ref 7–23)
CALCIUM SERPL-MCNC: 8.4 MG/DL — LOW (ref 8.5–10.1)
CHLORIDE SERPL-SCNC: 118 MMOL/L — HIGH (ref 96–108)
CO2 SERPL-SCNC: 27 MMOL/L — SIGNIFICANT CHANGE UP (ref 22–31)
COLOR SPEC: YELLOW — SIGNIFICANT CHANGE UP
CREAT SERPL-MCNC: 1.08 MG/DL — SIGNIFICANT CHANGE UP (ref 0.5–1.3)
DIFF PNL FLD: ABNORMAL
EGFR: 65 ML/MIN/1.73M2 — SIGNIFICANT CHANGE UP
EOSINOPHIL # BLD AUTO: 0.13 K/UL — SIGNIFICANT CHANGE UP (ref 0–0.5)
EOSINOPHIL NFR BLD AUTO: 2.1 % — SIGNIFICANT CHANGE UP (ref 0–6)
EPI CELLS # UR: SIGNIFICANT CHANGE UP
GLUCOSE SERPL-MCNC: 111 MG/DL — HIGH (ref 70–99)
GLUCOSE UR QL: NEGATIVE — SIGNIFICANT CHANGE UP
HCT VFR BLD CALC: 32.9 % — LOW (ref 39–50)
HGB BLD-MCNC: 11.2 G/DL — LOW (ref 13–17)
IMM GRANULOCYTES NFR BLD AUTO: 0.2 % — SIGNIFICANT CHANGE UP (ref 0–0.9)
INR BLD: 1.2 RATIO — HIGH (ref 0.88–1.16)
KETONES UR-MCNC: NEGATIVE — SIGNIFICANT CHANGE UP
LEUKOCYTE ESTERASE UR-ACNC: ABNORMAL
LYMPHOCYTES # BLD AUTO: 1.14 K/UL — SIGNIFICANT CHANGE UP (ref 1–3.3)
LYMPHOCYTES # BLD AUTO: 18.6 % — SIGNIFICANT CHANGE UP (ref 13–44)
MCHC RBC-ENTMCNC: 33.6 PG — SIGNIFICANT CHANGE UP (ref 27–34)
MCHC RBC-ENTMCNC: 34 GM/DL — SIGNIFICANT CHANGE UP (ref 32–36)
MCV RBC AUTO: 98.8 FL — SIGNIFICANT CHANGE UP (ref 80–100)
MONOCYTES # BLD AUTO: 0.97 K/UL — HIGH (ref 0–0.9)
MONOCYTES NFR BLD AUTO: 15.8 % — HIGH (ref 2–14)
NEUTROPHILS # BLD AUTO: 3.85 K/UL — SIGNIFICANT CHANGE UP (ref 1.8–7.4)
NEUTROPHILS NFR BLD AUTO: 63 % — SIGNIFICANT CHANGE UP (ref 43–77)
NITRITE UR-MCNC: NEGATIVE — SIGNIFICANT CHANGE UP
PH UR: 6 — SIGNIFICANT CHANGE UP (ref 5–8)
PLATELET # BLD AUTO: 145 K/UL — LOW (ref 150–400)
POTASSIUM SERPL-MCNC: 3.5 MMOL/L — SIGNIFICANT CHANGE UP (ref 3.5–5.3)
POTASSIUM SERPL-SCNC: 3.5 MMOL/L — SIGNIFICANT CHANGE UP (ref 3.5–5.3)
PROT SERPL-MCNC: 6.3 GM/DL — SIGNIFICANT CHANGE UP (ref 6–8.3)
PROT UR-MCNC: SIGNIFICANT CHANGE UP MG/DL
PROTHROM AB SERPL-ACNC: 14 SEC — HIGH (ref 10.5–13.4)
RBC # BLD: 3.33 M/UL — LOW (ref 4.2–5.8)
RBC # FLD: 13.3 % — SIGNIFICANT CHANGE UP (ref 10.3–14.5)
RBC CASTS # UR COMP ASSIST: SIGNIFICANT CHANGE UP /HPF (ref 0–4)
SODIUM SERPL-SCNC: 147 MMOL/L — HIGH (ref 135–145)
SP GR SPEC: 1.02 — SIGNIFICANT CHANGE UP (ref 1.01–1.02)
UROBILINOGEN FLD QL: NEGATIVE — SIGNIFICANT CHANGE UP
WBC # BLD: 6.12 K/UL — SIGNIFICANT CHANGE UP (ref 3.8–10.5)
WBC # FLD AUTO: 6.12 K/UL — SIGNIFICANT CHANGE UP (ref 3.8–10.5)
WBC UR QL: ABNORMAL /HPF (ref 0–5)

## 2023-07-12 PROCEDURE — 99285 EMERGENCY DEPT VISIT HI MDM: CPT

## 2023-07-12 PROCEDURE — 36415 COLL VENOUS BLD VENIPUNCTURE: CPT

## 2023-07-12 PROCEDURE — 97116 GAIT TRAINING THERAPY: CPT | Mod: GP

## 2023-07-12 PROCEDURE — 71045 X-RAY EXAM CHEST 1 VIEW: CPT | Mod: 26

## 2023-07-12 PROCEDURE — 93010 ELECTROCARDIOGRAM REPORT: CPT

## 2023-07-12 PROCEDURE — 85027 COMPLETE CBC AUTOMATED: CPT

## 2023-07-12 PROCEDURE — 97163 PT EVAL HIGH COMPLEX 45 MIN: CPT | Mod: GP

## 2023-07-12 PROCEDURE — 80048 BASIC METABOLIC PNL TOTAL CA: CPT

## 2023-07-12 PROCEDURE — 83735 ASSAY OF MAGNESIUM: CPT

## 2023-07-12 PROCEDURE — 74176 CT ABD & PELVIS W/O CONTRAST: CPT | Mod: 26,MA

## 2023-07-12 PROCEDURE — 70450 CT HEAD/BRAIN W/O DYE: CPT | Mod: 26,MA

## 2023-07-12 RX ORDER — CEFTRIAXONE 500 MG/1
1000 INJECTION, POWDER, FOR SOLUTION INTRAMUSCULAR; INTRAVENOUS ONCE
Refills: 0 | Status: COMPLETED | OUTPATIENT
Start: 2023-07-12 | End: 2023-07-12

## 2023-07-12 RX ORDER — SODIUM CHLORIDE 9 MG/ML
1000 INJECTION INTRAMUSCULAR; INTRAVENOUS; SUBCUTANEOUS ONCE
Refills: 0 | Status: COMPLETED | OUTPATIENT
Start: 2023-07-12 | End: 2023-07-12

## 2023-07-12 RX ADMIN — CEFTRIAXONE 1000 MILLIGRAM(S): 500 INJECTION, POWDER, FOR SOLUTION INTRAMUSCULAR; INTRAVENOUS at 21:21

## 2023-07-12 RX ADMIN — SODIUM CHLORIDE 1000 MILLILITER(S): 9 INJECTION INTRAMUSCULAR; INTRAVENOUS; SUBCUTANEOUS at 18:11

## 2023-07-12 NOTE — ED PROVIDER NOTE - CARE PLAN
1 Principal Discharge DX:	Altered mental status  Secondary Diagnosis:	Diarrhea  Secondary Diagnosis:	Acute UTI

## 2023-07-12 NOTE — ED ADULT TRIAGE NOTE - CHIEF COMPLAINT QUOTE
Pt c/o diarrhea x2 weeks and dehydration. HX of HDL, HTN, and dementia. Denies recent ABX use, abd. pain, N/V, fever, and urinary symptoms.

## 2023-07-12 NOTE — ED ADULT NURSE NOTE - NSFALLHARMRISKINTERV_ED_ALL_ED
Assistance OOB with selected safe patient handling equipment if applicable/Assistance with ambulation/Communicate risk of Fall with Harm to all staff, patient, and family/Provide visual cue: red socks, yellow wristband, yellow gown, etc/Reinforce activity limits and safety measures with patient and family/Toileting schedule using arm’s reach rule for commode and bathroom/Bed in lowest position, wheels locked, appropriate side rails in place/Call bell, personal items and telephone in reach/Instruct patient to call for assistance before getting out of bed/chair/stretcher/Non-slip footwear applied when patient is off stretcher/[place] to call system/Physically safe environment - no spills, clutter or unnecessary equipment/Purposeful Proactive Rounding/Room/bathroom lighting operational, light cord in reach

## 2023-07-12 NOTE — ED PROVIDER NOTE - CLINICAL SUMMARY MEDICAL DECISION MAKING FREE TEXT BOX
89 y/o pt w/ diarrhea now w generalized weakness and confusion per son. basic labs, CT, imaging, XRs, urine, re-eval. 89 y/o pt w/ diarrhea now w generalized weakness and confusion per son. basic labs, CT, imaging, XRs, urine, re-eval.    Krystin DO: diarrhea improved per son; patient's mental status improved at this time; will treat for uti in setting of episode of confusion at home; risks/benefits re: admission discussed with son- more comfortable with admission at this time. Endorsed to Dr. Corona

## 2023-07-12 NOTE — ED PROVIDER NOTE - OBJECTIVE STATEMENT
89 y/o male w/PMHx of BPH, dementia, depression, OA, HLD, and HTN presents to ED c/o diarrhea x2 weeks and dehydration. Denies recent ABX use for abd. pain, N/V, fever, and urinary symptoms. 89 y/o male w/PMHx of BPH, dementia, depression, OA, HLD, and HTN presents to ED c/o diarrhea x2 weeks and dehydration. Denies recent ABX use for abd. pain, N/V, fever, and urinary symptoms. no abd pain. no bloody stool. no vomiting. decreased PO intake. 89 y/o male w/PMHx of BPH, dementia, depression, OA, HLD, and HTN presents to ED c/o diarrhea x2 weeks and dehydration. Denies recent ABX use for abd. pain, N/V, fever, and urinary symptoms. no abd pain. no bloody stool. no vomiting. decreased PO intake; per son- patient was confused this afternoon- prompting visit to ED at this time.

## 2023-07-13 LAB
ANION GAP SERPL CALC-SCNC: 2 MMOL/L — LOW (ref 5–17)
BUN SERPL-MCNC: 14 MG/DL — SIGNIFICANT CHANGE UP (ref 7–23)
CALCIUM SERPL-MCNC: 8.3 MG/DL — LOW (ref 8.5–10.1)
CHLORIDE SERPL-SCNC: 119 MMOL/L — HIGH (ref 96–108)
CO2 SERPL-SCNC: 26 MMOL/L — SIGNIFICANT CHANGE UP (ref 22–31)
CREAT SERPL-MCNC: 0.93 MG/DL — SIGNIFICANT CHANGE UP (ref 0.5–1.3)
EGFR: 78 ML/MIN/1.73M2 — SIGNIFICANT CHANGE UP
GLUCOSE SERPL-MCNC: 87 MG/DL — SIGNIFICANT CHANGE UP (ref 70–99)
POTASSIUM SERPL-MCNC: 3.5 MMOL/L — SIGNIFICANT CHANGE UP (ref 3.5–5.3)
POTASSIUM SERPL-SCNC: 3.5 MMOL/L — SIGNIFICANT CHANGE UP (ref 3.5–5.3)
SODIUM SERPL-SCNC: 147 MMOL/L — HIGH (ref 135–145)

## 2023-07-13 PROCEDURE — 99223 1ST HOSP IP/OBS HIGH 75: CPT

## 2023-07-13 RX ORDER — SERTRALINE 25 MG/1
25 TABLET, FILM COATED ORAL DAILY
Refills: 0 | Status: DISCONTINUED | OUTPATIENT
Start: 2023-07-13 | End: 2023-07-16

## 2023-07-13 RX ORDER — MIRTAZAPINE 45 MG/1
15 TABLET, ORALLY DISINTEGRATING ORAL AT BEDTIME
Refills: 0 | Status: DISCONTINUED | OUTPATIENT
Start: 2023-07-13 | End: 2023-07-16

## 2023-07-13 RX ORDER — QUETIAPINE FUMARATE 200 MG/1
25 TABLET, FILM COATED ORAL AT BEDTIME
Refills: 0 | Status: DISCONTINUED | OUTPATIENT
Start: 2023-07-13 | End: 2023-07-16

## 2023-07-13 RX ORDER — CEFTRIAXONE 500 MG/1
1000 INJECTION, POWDER, FOR SOLUTION INTRAMUSCULAR; INTRAVENOUS EVERY 24 HOURS
Refills: 0 | Status: DISCONTINUED | OUTPATIENT
Start: 2023-07-13 | End: 2023-07-16

## 2023-07-13 RX ORDER — SODIUM CHLORIDE 9 MG/ML
1000 INJECTION, SOLUTION INTRAVENOUS
Refills: 0 | Status: DISCONTINUED | OUTPATIENT
Start: 2023-07-13 | End: 2023-07-14

## 2023-07-13 RX ORDER — ONDANSETRON 8 MG/1
4 TABLET, FILM COATED ORAL EVERY 8 HOURS
Refills: 0 | Status: DISCONTINUED | OUTPATIENT
Start: 2023-07-13 | End: 2023-07-16

## 2023-07-13 RX ORDER — GABAPENTIN 400 MG/1
600 CAPSULE ORAL AT BEDTIME
Refills: 0 | Status: DISCONTINUED | OUTPATIENT
Start: 2023-07-13 | End: 2023-07-16

## 2023-07-13 RX ORDER — QUETIAPINE FUMARATE 200 MG/1
25 TABLET, FILM COATED ORAL ONCE
Refills: 0 | Status: COMPLETED | OUTPATIENT
Start: 2023-07-13 | End: 2023-07-13

## 2023-07-13 RX ORDER — ACETAMINOPHEN 500 MG
650 TABLET ORAL EVERY 6 HOURS
Refills: 0 | Status: DISCONTINUED | OUTPATIENT
Start: 2023-07-13 | End: 2023-07-16

## 2023-07-13 RX ORDER — LANOLIN ALCOHOL/MO/W.PET/CERES
3 CREAM (GRAM) TOPICAL AT BEDTIME
Refills: 0 | Status: DISCONTINUED | OUTPATIENT
Start: 2023-07-13 | End: 2023-07-16

## 2023-07-13 RX ORDER — LOSARTAN POTASSIUM 100 MG/1
25 TABLET, FILM COATED ORAL DAILY
Refills: 0 | Status: DISCONTINUED | OUTPATIENT
Start: 2023-07-13 | End: 2023-07-16

## 2023-07-13 RX ORDER — CEFTRIAXONE 500 MG/1
1000 INJECTION, POWDER, FOR SOLUTION INTRAMUSCULAR; INTRAVENOUS EVERY 24 HOURS
Refills: 0 | Status: DISCONTINUED | OUTPATIENT
Start: 2023-07-13 | End: 2023-07-13

## 2023-07-13 RX ORDER — SODIUM CHLORIDE 9 MG/ML
1000 INJECTION INTRAMUSCULAR; INTRAVENOUS; SUBCUTANEOUS
Refills: 0 | Status: DISCONTINUED | OUTPATIENT
Start: 2023-07-13 | End: 2023-07-13

## 2023-07-13 RX ADMIN — CEFTRIAXONE 1000 MILLIGRAM(S): 500 INJECTION, POWDER, FOR SOLUTION INTRAMUSCULAR; INTRAVENOUS at 21:21

## 2023-07-13 RX ADMIN — QUETIAPINE FUMARATE 25 MILLIGRAM(S): 200 TABLET, FILM COATED ORAL at 21:22

## 2023-07-13 RX ADMIN — SERTRALINE 25 MILLIGRAM(S): 25 TABLET, FILM COATED ORAL at 13:06

## 2023-07-13 RX ADMIN — MIRTAZAPINE 15 MILLIGRAM(S): 45 TABLET, ORALLY DISINTEGRATING ORAL at 21:21

## 2023-07-13 RX ADMIN — SODIUM CHLORIDE 75 MILLILITER(S): 9 INJECTION, SOLUTION INTRAVENOUS at 13:05

## 2023-07-13 RX ADMIN — GABAPENTIN 600 MILLIGRAM(S): 400 CAPSULE ORAL at 21:27

## 2023-07-13 RX ADMIN — LOSARTAN POTASSIUM 25 MILLIGRAM(S): 100 TABLET, FILM COATED ORAL at 13:06

## 2023-07-13 NOTE — PHYSICAL THERAPY INITIAL EVALUATION ADULT - MODALITIES TREATMENT COMMENTS
pt left in bed supine post Eval @ RN Loraine's request; bed alarm on; RN Tonja present; callbell in reach; pt instructed not to get up alone; call nursing for assist; meryl well; denied pain pt left in bed supine post Eval @ JONI Cruz's request; bed alarm on; JONI Evangelista present; callbell in reach; pt instructed not to get up alone; call nursing for assist; meryl well; denied pain pt left in bed supine post Eval @ JONI Morales's request; bed alarm on; JONI Evangelista present; callbell in reach; pt instructed not to get up alone; call nursing for assist; meryl well; denied pain

## 2023-07-13 NOTE — PHARMACOTHERAPY INTERVENTION NOTE - COMMENTS
Medication history complete, reviewed medication with patients son Guero at [phone] and confirmed with [facility].  Patients son states that patient takes Losartan 25mg - Drfirst shows Losartan 50mg filled on 5/16.

## 2023-07-13 NOTE — PHYSICAL THERAPY INITIAL EVALUATION ADULT - LIVES WITH, PROFILE
son/children spouse / son; Cond[employer] with elevator/children son; Cond[employer] with elevator; HHA in evenings/children/spouse

## 2023-07-13 NOTE — H&P ADULT - HISTORY OF PRESENT ILLNESS
{\rtf1\zyfotu90031\ansi\tmlzpre6281\ftnbj\uc1\deff0  {\fonttbl{\f0 \fnil Microsoft Sans Serif;}{\f1 \fnil \fcharset0 Microsoft Sans Serif;}{\f2 \fnil Segoe UI;}{\f3 \fnil Times New Roberto;}}  {\colortbl ;\auk590\xsmje470\prnn939 ;\red0\green0\blue0 ;\red0\green0\tddv075 ;\red0\green0\blue0 ;}  {\stylesheet{\f0\fs20 Normal;}{\cs1 Default Paragraph Font;}{\cs2\f2\fs16 Line Number;}{\cs3\f3\fs24\ul\cf3 Hyperlink;}}  {\*\revtbl{Unknown;}}  \dsopel34638\mjicit24259\caqec7313\vpohv6224\yjmwr7292\yrwjr2658\ygphvyz587\uakrvvg144\nogrowautofit\fllvtw737\formshade\nofeaturethrottle1\dntblnsbdb\fet4\aendnotes\aftnnrlc\pgbrdrhead\pgbrdrfoot  \sectd\jrxqkz99256\eqzqur34145\guttersxn0\gcwtudbx8849\mlkyjqrl3800\lvzubwqt3743\uznwlayr3921\mqgwwkv488\qxnyrqg047\sbkpage\pgncont\pgndec  \plain\plain\f0\fs24  \trowd\jcsecm87\wvnwnkn84\trpaddfl3\nerdnag05\trpaddfr3\trpaddt0\trpaddft3\trpaddb0\trpaddfb3\trleft0  \clvertalt\veblga99\clpadft3\wlstbg73\clpadfr3\clpadl0\clpadfl3\clpadb0\clpadfb3\lodqk5012  \pard\intbl\ssparaaux0\s0\fi-120\li120\ql\plain\f0\fs24{\*\bkmkstart [id number]}{\*\bkmkend [id number]}\plain\f0\fs20\ixdh1924\hich\f0\dbch\f0\loch\f0\fs20 \'b7 \plain\f1\fs20\baip6984\hich\f1\dbch\f1\loch\f1\cf2\fs20\b  \plain\f0\fs20\tavx1327\hich\f0\dbch\f0\loch\f0\fs20   89 y/o male w/PMHx of BPH, dementia, depression, OA, HLD, and HTN presents to ED c/o diarrhea x2 weeks and dehydration. Denies recent ABX use for abd. pain, No N/V, fever, and urinary symptoms. no abd pain. no bloody stool. no vomiting. decreased PO intake;   per son- patient was confused this afternoon- prompting visit to ED at this time.\cell  \intbl\row  \trowd\rnwupq22\lastrow\kgkwmsj74\trpaddfl3\uqvqqge80\trpaddfr3\trpaddt0\trpaddft3\trpaddb0\trpaddfb3\trleft0  \clvertalt\obcwwo64\clpadft3\duvpdq03\clpadfr3\clpadl0\clpadfl3\clpadb0\clpadfb3\ahqqp3577  \pard\intbl\ssparaaux0\s0\ql\plain\f0\fs24\plain\f1\fs20\pbqg1137\hich\f1\dbch\f1\loch\f1\cf2\fs20\strike\plain\f0\fs20\xesz0490\hich\f0\dbch\f0\loch\f0\fs20\cell  \intbl\row  \pard\ssparaaux0\s0\ql\plain\f0\fs24\plain\f0\fs20\vwuj8395\hich\f0\dbch\f0\loch\f0\fs20 History limited due to dementia; oriented to name only \par  \par  \plain\f1\fs20\tinf4881\hich\f1\dbch\f1\loch\f1\cf2\fs20\b\par  {\*\bkmkstart [id number]}{\*\bkmkend [id number]}PAST MEDICAL HISTORY:\plain\f0\fs20\didm0178\hich\f0\dbch\f0\loch\f0\fs20\par  Anxiety \par  BPH (benign prostatic hyperplasia) \par  Dementia \par  Depression \par  Hyperlipidemia \par  Hypertension \par  OA (osteoarthritis). \par  \par  {\*\bkmkstart [id number]}{\*\bkmkend [id number]}\plain\f1\fs20\jxvw5825\hich\f1\dbch\f1\loch\f1\cf2\fs20\b PAST SURGICAL HISTORY:\plain\f0\fs20\pize0432\hich\f0\dbch\f0\loch\f0\fs20\par  H/O total knee replacement, right \par  History of tonsillectomy.\par  \par  \plain\f1\fs20\dqzr1619\hich\f1\dbch\f1\loch\f1\cf2\fs20\b Social History:\plain\f0\fs20\zreq4444\hich\f0\dbch\f0\loch\f0\fs20\par  no smoking, no Etoh\par  \par  \plain\f1\fs20\oqmv2176\hich\f1\dbch\f1\loch\f1\cf2\fs20\b Family History:\plain\f0\fs20\jgtt3686\hich\f0\dbch\f0\loch\f0\fs20\par  unable to obtain due to Dementia \par  \par  \pard\plain\f0\fs24\plain\f0\fs20\upow4636\hich\f0\dbch\f0\loch\f0\fs20\par  \par  REVIEW OF SYSTEMS:\par  unable to obtain due to dementia, confusion\par  \par  All other review of systems is negative unless indicated above\par  \par  Vital Signs Last 24 Hrs\par  T(C): 36.5 (13 Jul 2023 08:29), Max: 37.2 (12 Jul 2023 22:00)\par  T(F): 97.7 (13 Jul 2023 08:29), Max: 98.9 (12 Jul 2023 22:00)\par  HR: 84 (13 Jul 2023 08:29) (65 - 84)\par  BP: 169/84 (13 Jul 2023 08:29) (145/81 - 169/84)\par  BP(mean): 109 (13 Jul 2023 08:29) (75 - 109)\par  RR: 18 (13 Jul 2023 08:29) (16 - 19)\par  SpO2: 97% (13 Jul 2023 08:29) (95% - 100%)\par  \par  Parameters below as of 13 Jul 2023 08:29\par  Patient On (Oxygen Delivery Method): room air\par  \par  \par  \par  PHYSICAL EXAM:\par  \par  GENERAL: NAD\par  HEENT:  NC/AT, EOMI, PERRLA, No scleral icterus, Moist mucous membranes\par  NECK: Supple, No JVD\par  CNS:  Alert & Oriented X1, Motor Strength 5/5 B/L upper and lower extremities; DTRs 2+ intact \par  LUNG: Normal Breath sounds, Clear to auscultation bilaterally, No rales, No rhonchi, No wheezing\par  HEART: RRR; No murmurs, No rubs\par  ABDOMEN: +BS, ST/ND/NT\par  GENITOURINARY: Voiding, Bladder not distended\par  EXTREMITIES:  2+ Peripheral Pulses, No clubbing, No cyanosis, No tibial edema\par  MUSCULOSKELTAL: Joints normal ROM, No TTP, No effusion\par  SKIN: no rashes, no sores\par  RECTAL: deferred, not indicated\par  BREAST: deferred\par  \par           \par             11.2 \par  6.12  )-----------( 145      ( 12 Jul 2023 18:08 )\par             32.9 \par  \par  07-13\par  \par  147<H>  |  119<H>  |  14\par  ----------------------------<  87\par  3.5   |  26  |  0.93\par  \par  Ca    8.3<L>      13 Jul 2023 09:40\par  Mg     1.9     07-12\par  \par  TPro  6.3  /  Alb  3.1<L>  /  TBili  0.7  /  DBili  x   /  AST  37  /  ALT  40  /  AlkPhos  82  07-12\par  \par  Vancomycin levels: \par  Cultures: \par  \par  MEDICATIONS  (STANDING):\par  gabapentin 600 milliGRAM(s) Oral at bedtime\par  losartan 25 milliGRAM(s) Oral daily\par  mirtazapine 15 milliGRAM(s) Oral at bedtime\par  QUEtiapine 25 milliGRAM(s) Oral at bedtime\par  sertraline 25 milliGRAM(s) Oral daily\par  sodium chloride 0.9%. 1000 milliLiter(s) (60 mL/Hr) IV Continuous <Continuous>\par  \par  MEDICATIONS  (PRN):\par  acetaminophen     Tablet .. 650 milliGRAM(s) Oral every 6 hours PRN Temp greater or equal to 38C (100.4F), Mild Pain (1 - 3)\par  aluminum hydroxide/magnesium hydroxide/simethicone Suspension 30 milliLiter(s) Oral every 4 hours PRN Dyspepsia\par  melatonin 3 milliGRAM(s) Oral at bedtime PRN Insomnia\par  ondansetron Injectable 4 milliGRAM(s) IV Push every 8 hours PRN Nausea and/or Vomiting\par  \par  \par  all labs reviewed\par  all imaging reviewed\par  \par  \ql\plain\f0\fs24\plain\f0\fs20\bssw7158\hich\f0\dbch\f0\loch\f0\fs20\par  1. Diarrhea: resolved\par  no evidence of diarrhea overnight \par  will monitor, and send stool Cx if necessary \par  \par  2. Encephalopathy Metabolic due to dehydration\par  superimposed on Dementia\par  IV Fluids: NS at 60cc /hr\par  \par  3. Htn: stable on ARB\par  increase prn\par  \par  \par  }   ·  89 y/o male w/PMHx of BPH, dementia, depression, OA, HLD, and HTN presents to ED c/o diarrhea x2 weeks and dehydration. Denies recent ABX use for abd. pain, No N/V, fever, and urinary symptoms. no abd pain. no bloody stool. no vomiting. decreased PO intake; per son- patient was confused this afternoon- prompting visit to ED at this time.    History limited due to dementia; oriented to name only       PAST MEDICAL HISTORY:  Anxiety   BPH (benign prostatic hyperplasia)   Dementia   Depression   Hyperlipidemia   Hypertension   OA (osteoarthritis).     PAST SURGICAL HISTORY:  H/O total knee replacement, right   History of tonsillectomy.    Social History:  no smoking, no Etoh  lives with wife and son; has aid at night   walks with walker     Family History:  unable to obtain due to Dementia         REVIEW OF SYSTEMS:  unable to obtain due to dementia, confusion    All other review of systems is negative unless indicated above    Vital Signs Last 24 Hrs  T(C): 36.5 (13 Jul 2023 08:29), Max: 37.2 (12 Jul 2023 22:00)  T(F): 97.7 (13 Jul 2023 08:29), Max: 98.9 (12 Jul 2023 22:00)  HR: 84 (13 Jul 2023 08:29) (65 - 84)  BP: 169/84 (13 Jul 2023 08:29) (145/81 - 169/84)  BP(mean): 109 (13 Jul 2023 08:29) (75 - 109)  RR: 18 (13 Jul 2023 08:29) (16 - 19)  SpO2: 97% (13 Jul 2023 08:29) (95% - 100%)    Parameters below as of 13 Jul 2023 08:29  Patient On (Oxygen Delivery Method): room air        PHYSICAL EXAM:    GENERAL: NAD  HEENT:  NC/AT, EOMI, PERRLA, No scleral icterus, Moist mucous membranes  NECK: Supple, No JVD  CNS:  Alert & Oriented X1, Motor Strength 5/5 B/L upper and lower extremities; DTRs 2+ intact   LUNG: Normal Breath sounds, Clear to auscultation bilaterally, No rales, No rhonchi, No wheezing  HEART: RRR; No murmurs, No rubs  ABDOMEN: +BS, ST/ND/NT  GENITOURINARY: Voiding, Bladder not distended  EXTREMITIES:  2+ Peripheral Pulses, No clubbing, No cyanosis, No tibial edema  MUSCULOSKELTAL: Joints normal ROM, No TTP, No effusion  SKIN: no rashes, no sores  RECTAL: deferred, not indicated  BREAST: deferred                          11.2   6.12  )-----------( 145      ( 12 Jul 2023 18:08 )             32.9     07-13    147<H>  |  119<H>  |  14  ----------------------------<  87  3.5   |  26  |  0.93    Ca    8.3<L>      13 Jul 2023 09:40  Mg     1.9     07-12    TPro  6.3  /  Alb  3.1<L>  /  TBili  0.7  /  DBili  x   /  AST  37  /  ALT  40  /  AlkPhos  82  07-12    Vancomycin levels:   Cultures:     MEDICATIONS  (STANDING):  gabapentin 600 milliGRAM(s) Oral at bedtime  losartan 25 milliGRAM(s) Oral daily  mirtazapine 15 milliGRAM(s) Oral at bedtime  QUEtiapine 25 milliGRAM(s) Oral at bedtime  sertraline 25 milliGRAM(s) Oral daily  sodium chloride 0.9%. 1000 milliLiter(s) (60 mL/Hr) IV Continuous <Continuous>    MEDICATIONS  (PRN):  acetaminophen     Tablet .. 650 milliGRAM(s) Oral every 6 hours PRN Temp greater or equal to 38C (100.4F), Mild Pain (1 - 3)  aluminum hydroxide/magnesium hydroxide/simethicone Suspension 30 milliLiter(s) Oral every 4 hours PRN Dyspepsia  melatonin 3 milliGRAM(s) Oral at bedtime PRN Insomnia  ondansetron Injectable 4 milliGRAM(s) IV Push every 8 hours PRN Nausea and/or Vomiting      all labs reviewed  all imaging reviewed      1. Diarrhea: resolved  no evidence of diarrhea overnight   will monitor, and send stool Cx if necessary     2. Encephalopathy Metabolic due to dehydration  superimposed on Dementia  IV Fluids: NS at 60cc /hr    3. Htn: stable on ARB  increase prn    4. Pyuria:  ? Uti  f/u UCx, Blood Cx  Ceftriaxone pending UCx     ·  89 y/o male w/PMHx of BPH, dementia, depression, OA, HLD, and HTN presents to ED c/o diarrhea x2 weeks and dehydration. Denies recent ABX use for abd. pain, No N/V, fever, and urinary symptoms. no abd pain. no bloody stool. no vomiting. decreased PO intake; per son- patient was confused this afternoon- prompting visit to ED at this time.    History limited due to dementia; oriented to name only       PAST MEDICAL HISTORY:  Anxiety   BPH (benign prostatic hyperplasia)   Dementia   Depression   Hyperlipidemia   Hypertension   OA (osteoarthritis).     PAST SURGICAL HISTORY:  H/O total knee replacement, right   History of tonsillectomy.    Social History:  no smoking, no Etoh  lives with wife and son; has aid at night   walks with walker     Family History:  unable to obtain due to Dementia         REVIEW OF SYSTEMS:  unable to obtain due to dementia, confusion    All other review of systems is negative unless indicated above    Vital Signs Last 24 Hrs  T(C): 36.5 (13 Jul 2023 08:29), Max: 37.2 (12 Jul 2023 22:00)  T(F): 97.7 (13 Jul 2023 08:29), Max: 98.9 (12 Jul 2023 22:00)  HR: 84 (13 Jul 2023 08:29) (65 - 84)  BP: 169/84 (13 Jul 2023 08:29) (145/81 - 169/84)  BP(mean): 109 (13 Jul 2023 08:29) (75 - 109)  RR: 18 (13 Jul 2023 08:29) (16 - 19)  SpO2: 97% (13 Jul 2023 08:29) (95% - 100%)    Parameters below as of 13 Jul 2023 08:29  Patient On (Oxygen Delivery Method): room air        PHYSICAL EXAM:    GENERAL: NAD  HEENT:  NC/AT, EOMI, PERRLA, No scleral icterus, Moist mucous membranes  NECK: Supple, No JVD  CNS:  Alert & Oriented X1, Motor Strength 5/5 B/L upper and lower extremities; DTRs 2+ intact   LUNG: Normal Breath sounds, Clear to auscultation bilaterally, No rales, No rhonchi, No wheezing  HEART: RRR; No murmurs, No rubs  ABDOMEN: +BS, ST/ND/NT  GENITOURINARY: Voiding, Bladder not distended  EXTREMITIES:  2+ Peripheral Pulses, No clubbing, No cyanosis, No tibial edema  MUSCULOSKELTAL: Joints normal ROM, No TTP, No effusion  SKIN: no rashes, no sores  RECTAL: deferred, not indicated  BREAST: deferred                          11.2   6.12  )-----------( 145      ( 12 Jul 2023 18:08 )             32.9     07-13    147<H>  |  119<H>  |  14  ----------------------------<  87  3.5   |  26  |  0.93    Ca    8.3<L>      13 Jul 2023 09:40  Mg     1.9     07-12    TPro  6.3  /  Alb  3.1<L>  /  TBili  0.7  /  DBili  x   /  AST  37  /  ALT  40  /  AlkPhos  82  07-12    Vancomycin levels:   Cultures:     MEDICATIONS  (STANDING):  gabapentin 600 milliGRAM(s) Oral at bedtime  losartan 25 milliGRAM(s) Oral daily  mirtazapine 15 milliGRAM(s) Oral at bedtime  QUEtiapine 25 milliGRAM(s) Oral at bedtime  sertraline 25 milliGRAM(s) Oral daily  sodium chloride 0.9%. 1000 milliLiter(s) (60 mL/Hr) IV Continuous <Continuous>    MEDICATIONS  (PRN):  acetaminophen     Tablet .. 650 milliGRAM(s) Oral every 6 hours PRN Temp greater or equal to 38C (100.4F), Mild Pain (1 - 3)  aluminum hydroxide/magnesium hydroxide/simethicone Suspension 30 milliLiter(s) Oral every 4 hours PRN Dyspepsia  melatonin 3 milliGRAM(s) Oral at bedtime PRN Insomnia  ondansetron Injectable 4 milliGRAM(s) IV Push every 8 hours PRN Nausea and/or Vomiting      all labs reviewed  all imaging reviewed      1. Diarrhea: resolved  no evidence of diarrhea overnight   will monitor, and send stool Cx if necessary     2. Encephalopathy Metabolic due to dehydration  superimposed on Dementia  HyperNatremia  IV Fluids: D5W at 60cc /hr    3. Htn: stable on ARB  increase prn    4. Pyuria:  ? Uti  f/u UCx, Blood Cx  Ceftriaxone pending UCx

## 2023-07-13 NOTE — PHYSICAL THERAPY INITIAL EVALUATION ADULT - LEVEL OF INDEPENDENCE: GAIT, REHAB EVAL
transfer/gait training/bed mob 23'/minimum assist (75% patients effort)/moderate assist (50% patients effort)

## 2023-07-14 LAB
ANION GAP SERPL CALC-SCNC: 2 MMOL/L — LOW (ref 5–17)
ANION GAP SERPL CALC-SCNC: 3 MMOL/L — LOW (ref 5–17)
BUN SERPL-MCNC: 11 MG/DL — SIGNIFICANT CHANGE UP (ref 7–23)
BUN SERPL-MCNC: 13 MG/DL — SIGNIFICANT CHANGE UP (ref 7–23)
CALCIUM SERPL-MCNC: 8.1 MG/DL — LOW (ref 8.5–10.1)
CALCIUM SERPL-MCNC: 8.1 MG/DL — LOW (ref 8.5–10.1)
CHLORIDE SERPL-SCNC: 115 MMOL/L — HIGH (ref 96–108)
CHLORIDE SERPL-SCNC: 117 MMOL/L — HIGH (ref 96–108)
CO2 SERPL-SCNC: 27 MMOL/L — SIGNIFICANT CHANGE UP (ref 22–31)
CO2 SERPL-SCNC: 28 MMOL/L — SIGNIFICANT CHANGE UP (ref 22–31)
CREAT SERPL-MCNC: 0.97 MG/DL — SIGNIFICANT CHANGE UP (ref 0.5–1.3)
CREAT SERPL-MCNC: 1.01 MG/DL — SIGNIFICANT CHANGE UP (ref 0.5–1.3)
CULTURE RESULTS: SIGNIFICANT CHANGE UP
EGFR: 70 ML/MIN/1.73M2 — SIGNIFICANT CHANGE UP
EGFR: 74 ML/MIN/1.73M2 — SIGNIFICANT CHANGE UP
GLUCOSE SERPL-MCNC: 115 MG/DL — HIGH (ref 70–99)
GLUCOSE SERPL-MCNC: 87 MG/DL — SIGNIFICANT CHANGE UP (ref 70–99)
HCT VFR BLD CALC: 31.3 % — LOW (ref 39–50)
HCT VFR BLD CALC: 33.8 % — LOW (ref 39–50)
HGB BLD-MCNC: 10.7 G/DL — LOW (ref 13–17)
HGB BLD-MCNC: 11.7 G/DL — LOW (ref 13–17)
MAGNESIUM SERPL-MCNC: 1.9 MG/DL — SIGNIFICANT CHANGE UP (ref 1.6–2.6)
MCHC RBC-ENTMCNC: 33.4 PG — SIGNIFICANT CHANGE UP (ref 27–34)
MCHC RBC-ENTMCNC: 33.7 PG — SIGNIFICANT CHANGE UP (ref 27–34)
MCHC RBC-ENTMCNC: 34.2 GM/DL — SIGNIFICANT CHANGE UP (ref 32–36)
MCHC RBC-ENTMCNC: 34.6 GM/DL — SIGNIFICANT CHANGE UP (ref 32–36)
MCV RBC AUTO: 97.4 FL — SIGNIFICANT CHANGE UP (ref 80–100)
MCV RBC AUTO: 97.8 FL — SIGNIFICANT CHANGE UP (ref 80–100)
PLATELET # BLD AUTO: 130 K/UL — LOW (ref 150–400)
PLATELET # BLD AUTO: 135 K/UL — LOW (ref 150–400)
POTASSIUM SERPL-MCNC: 3.1 MMOL/L — LOW (ref 3.5–5.3)
POTASSIUM SERPL-MCNC: 3.7 MMOL/L — SIGNIFICANT CHANGE UP (ref 3.5–5.3)
POTASSIUM SERPL-SCNC: 3.1 MMOL/L — LOW (ref 3.5–5.3)
POTASSIUM SERPL-SCNC: 3.7 MMOL/L — SIGNIFICANT CHANGE UP (ref 3.5–5.3)
RBC # BLD: 3.2 M/UL — LOW (ref 4.2–5.8)
RBC # BLD: 3.47 M/UL — LOW (ref 4.2–5.8)
RBC # FLD: 13 % — SIGNIFICANT CHANGE UP (ref 10.3–14.5)
RBC # FLD: 13.1 % — SIGNIFICANT CHANGE UP (ref 10.3–14.5)
SODIUM SERPL-SCNC: 145 MMOL/L — SIGNIFICANT CHANGE UP (ref 135–145)
SODIUM SERPL-SCNC: 147 MMOL/L — HIGH (ref 135–145)
SPECIMEN SOURCE: SIGNIFICANT CHANGE UP
WBC # BLD: 4.67 K/UL — SIGNIFICANT CHANGE UP (ref 3.8–10.5)
WBC # BLD: 5.47 K/UL — SIGNIFICANT CHANGE UP (ref 3.8–10.5)
WBC # FLD AUTO: 4.67 K/UL — SIGNIFICANT CHANGE UP (ref 3.8–10.5)
WBC # FLD AUTO: 5.47 K/UL — SIGNIFICANT CHANGE UP (ref 3.8–10.5)

## 2023-07-14 PROCEDURE — 99233 SBSQ HOSP IP/OBS HIGH 50: CPT

## 2023-07-14 RX ORDER — POTASSIUM CHLORIDE 20 MEQ
40 PACKET (EA) ORAL
Refills: 0 | Status: COMPLETED | OUTPATIENT
Start: 2023-07-14 | End: 2023-07-14

## 2023-07-14 RX ORDER — SODIUM CHLORIDE 9 MG/ML
1000 INJECTION, SOLUTION INTRAVENOUS
Refills: 0 | Status: COMPLETED | OUTPATIENT
Start: 2023-07-14 | End: 2023-07-15

## 2023-07-14 RX ORDER — OFLOXACIN 0.3 %
2 DROPS OPHTHALMIC (EYE) THREE TIMES A DAY
Refills: 0 | Status: DISCONTINUED | OUTPATIENT
Start: 2023-07-14 | End: 2023-07-14

## 2023-07-14 RX ORDER — NEOMYCIN/POLYMYXIN B/DEXAMETHA 0.1 %
1 SUSPENSION, DROPS(FINAL DOSAGE FORM)(ML) OPHTHALMIC (EYE)
Refills: 0 | Status: DISCONTINUED | OUTPATIENT
Start: 2023-07-14 | End: 2023-07-16

## 2023-07-14 RX ORDER — HYDRALAZINE HCL 50 MG
5 TABLET ORAL ONCE
Refills: 0 | Status: COMPLETED | OUTPATIENT
Start: 2023-07-14 | End: 2023-07-14

## 2023-07-14 RX ADMIN — Medication 40 MILLIEQUIVALENT(S): at 22:07

## 2023-07-14 RX ADMIN — Medication 3 MILLIGRAM(S): at 23:30

## 2023-07-14 RX ADMIN — SODIUM CHLORIDE 100 MILLILITER(S): 9 INJECTION, SOLUTION INTRAVENOUS at 20:41

## 2023-07-14 RX ADMIN — SODIUM CHLORIDE 100 MILLILITER(S): 9 INJECTION, SOLUTION INTRAVENOUS at 13:11

## 2023-07-14 RX ADMIN — Medication 5 MILLIGRAM(S): at 01:00

## 2023-07-14 RX ADMIN — MIRTAZAPINE 15 MILLIGRAM(S): 45 TABLET, ORALLY DISINTEGRATING ORAL at 22:07

## 2023-07-14 RX ADMIN — Medication 1 APPLICATION(S): at 17:26

## 2023-07-14 RX ADMIN — CEFTRIAXONE 1000 MILLIGRAM(S): 500 INJECTION, POWDER, FOR SOLUTION INTRAMUSCULAR; INTRAVENOUS at 22:06

## 2023-07-14 RX ADMIN — SODIUM CHLORIDE 75 MILLILITER(S): 9 INJECTION, SOLUTION INTRAVENOUS at 06:36

## 2023-07-14 RX ADMIN — Medication 40 MILLIEQUIVALENT(S): at 13:10

## 2023-07-14 RX ADMIN — LOSARTAN POTASSIUM 25 MILLIGRAM(S): 100 TABLET, FILM COATED ORAL at 10:37

## 2023-07-14 RX ADMIN — SERTRALINE 25 MILLIGRAM(S): 25 TABLET, FILM COATED ORAL at 10:37

## 2023-07-14 RX ADMIN — GABAPENTIN 600 MILLIGRAM(S): 400 CAPSULE ORAL at 22:07

## 2023-07-14 RX ADMIN — QUETIAPINE FUMARATE 25 MILLIGRAM(S): 200 TABLET, FILM COATED ORAL at 22:07

## 2023-07-14 NOTE — PATIENT PROFILE ADULT - FALL HARM RISK - HARM RISK INTERVENTIONS
Assistance with ambulation/Assistance OOB with selected safe patient handling equipment/Communicate Risk of Fall with Harm to all staff/Discuss with provider need for PT consult/Monitor for mental status changes/Monitor gait and stability/Move patient closer to nurses' station/Provide patient with walking aids - walker, cane, crutches/Reinforce activity limits and safety measures with patient and family/Reorient to person, place and time as needed/Tailored Fall Risk Interventions/Toileting schedule using arm’s reach rule for commode and bathroom/Use of alarms - bed, chair and/or voice tab/Visual Cue: Yellow wristband and red socks/Bed in lowest position, wheels locked, appropriate side rails in place/Call bell, personal items and telephone in reach/Instruct patient to call for assistance before getting out of bed or chair/Non-slip footwear when patient is out of bed/[place] to call system/Physically safe environment - no spills, clutter or unnecessary equipment/Purposeful Proactive Rounding/Room/bathroom lighting operational, light cord in reach

## 2023-07-14 NOTE — ED ADULT NURSE REASSESSMENT NOTE - NS ED NURSE REASSESS COMMENT FT1
Pt. received from previous RN in stable condition, no s/sx of resp. distress/pain. Needs anticipated by staff,  VSS, safety maintained. Pt. slightly agitated/restless.  Isolation precautions in prog, call bell within reach, will continue to monitor.
pt BP found to be elevated to 190s/90s, taken on both arms multiple times.  MD Barth made aware, IV hydralazine ordered and to be administered, pt took morning dose of losartan, no other BP meds given afterwards.  pt mentation/behavior is the same and has not changed, denies CP/SOB, will ctm.
Assisted with urinal use, no BM since in ED. Patient currently sleeping, no distress noted. Report given to Tonja QUINONES.

## 2023-07-14 NOTE — PROGRESS NOTE ADULT - ASSESSMENT
89 y/o male w/PMHx of BPH, dementia, depression, OA, HLD, and HTN presents to ED c/o diarrhea x2 weeks and abdominal pain.     # Acute metabolic encephalopathy secondary to AMA from diarrhea  - Increase D5W to 100 cc  - Trend BMP  - CT head negative    # Diarrhea resolved  - Check Cdiff and GI PCR if reoccurs   - CT abd/pelvis negative for acute process    # Hypokalemia  - Replete  - Check magnesium    # UTI  - Continue Rocephin  - FU urine and blood cultures    # Dementia  - Continue Seroquel  - Continue Mirtazapine  - Continue Sertraline    # HTN  - Losartan    # Disposition: Full code

## 2023-07-15 LAB
ANION GAP SERPL CALC-SCNC: 3 MMOL/L — LOW (ref 5–17)
BUN SERPL-MCNC: 10 MG/DL — SIGNIFICANT CHANGE UP (ref 7–23)
CALCIUM SERPL-MCNC: 8.2 MG/DL — LOW (ref 8.5–10.1)
CHLORIDE SERPL-SCNC: 117 MMOL/L — HIGH (ref 96–108)
CO2 SERPL-SCNC: 25 MMOL/L — SIGNIFICANT CHANGE UP (ref 22–31)
CREAT SERPL-MCNC: 0.92 MG/DL — SIGNIFICANT CHANGE UP (ref 0.5–1.3)
EGFR: 79 ML/MIN/1.73M2 — SIGNIFICANT CHANGE UP
GLUCOSE SERPL-MCNC: 116 MG/DL — HIGH (ref 70–99)
HCT VFR BLD CALC: 31.8 % — LOW (ref 39–50)
HGB BLD-MCNC: 10.8 G/DL — LOW (ref 13–17)
MCHC RBC-ENTMCNC: 33.4 PG — SIGNIFICANT CHANGE UP (ref 27–34)
MCHC RBC-ENTMCNC: 34 GM/DL — SIGNIFICANT CHANGE UP (ref 32–36)
MCV RBC AUTO: 98.5 FL — SIGNIFICANT CHANGE UP (ref 80–100)
PLATELET # BLD AUTO: 126 K/UL — LOW (ref 150–400)
POTASSIUM SERPL-MCNC: 3.4 MMOL/L — LOW (ref 3.5–5.3)
POTASSIUM SERPL-SCNC: 3.4 MMOL/L — LOW (ref 3.5–5.3)
RBC # BLD: 3.23 M/UL — LOW (ref 4.2–5.8)
RBC # FLD: 13.1 % — SIGNIFICANT CHANGE UP (ref 10.3–14.5)
SODIUM SERPL-SCNC: 145 MMOL/L — SIGNIFICANT CHANGE UP (ref 135–145)
WBC # BLD: 4.2 K/UL — SIGNIFICANT CHANGE UP (ref 3.8–10.5)
WBC # FLD AUTO: 4.2 K/UL — SIGNIFICANT CHANGE UP (ref 3.8–10.5)

## 2023-07-15 PROCEDURE — 99232 SBSQ HOSP IP/OBS MODERATE 35: CPT

## 2023-07-15 RX ORDER — HALOPERIDOL DECANOATE 100 MG/ML
0.5 INJECTION INTRAMUSCULAR ONCE
Refills: 0 | Status: DISCONTINUED | OUTPATIENT
Start: 2023-07-15 | End: 2023-07-16

## 2023-07-15 RX ORDER — QUETIAPINE FUMARATE 200 MG/1
12.5 TABLET, FILM COATED ORAL
Refills: 0 | Status: DISCONTINUED | OUTPATIENT
Start: 2023-07-15 | End: 2023-07-16

## 2023-07-15 RX ORDER — QUETIAPINE FUMARATE 200 MG/1
12.5 TABLET, FILM COATED ORAL ONCE
Refills: 0 | Status: COMPLETED | OUTPATIENT
Start: 2023-07-15 | End: 2023-07-15

## 2023-07-15 RX ORDER — POTASSIUM CHLORIDE 20 MEQ
20 PACKET (EA) ORAL ONCE
Refills: 0 | Status: COMPLETED | OUTPATIENT
Start: 2023-07-15 | End: 2023-07-16

## 2023-07-15 RX ADMIN — SERTRALINE 25 MILLIGRAM(S): 25 TABLET, FILM COATED ORAL at 09:34

## 2023-07-15 RX ADMIN — QUETIAPINE FUMARATE 12.5 MILLIGRAM(S): 200 TABLET, FILM COATED ORAL at 17:37

## 2023-07-15 RX ADMIN — QUETIAPINE FUMARATE 25 MILLIGRAM(S): 200 TABLET, FILM COATED ORAL at 21:54

## 2023-07-15 RX ADMIN — SODIUM CHLORIDE 100 MILLILITER(S): 9 INJECTION, SOLUTION INTRAVENOUS at 19:44

## 2023-07-15 RX ADMIN — SODIUM CHLORIDE 100 MILLILITER(S): 9 INJECTION, SOLUTION INTRAVENOUS at 09:35

## 2023-07-15 RX ADMIN — LOSARTAN POTASSIUM 25 MILLIGRAM(S): 100 TABLET, FILM COATED ORAL at 09:34

## 2023-07-15 RX ADMIN — Medication 1 APPLICATION(S): at 06:03

## 2023-07-15 RX ADMIN — Medication 1 APPLICATION(S): at 18:25

## 2023-07-15 RX ADMIN — SODIUM CHLORIDE 100 MILLILITER(S): 9 INJECTION, SOLUTION INTRAVENOUS at 05:50

## 2023-07-15 RX ADMIN — QUETIAPINE FUMARATE 12.5 MILLIGRAM(S): 200 TABLET, FILM COATED ORAL at 10:06

## 2023-07-15 NOTE — PROGRESS NOTE ADULT - SUBJECTIVE AND OBJECTIVE BOX
HPI:  89 y/o male w/PMHx of BPH, dementia, depression, OA, HLD, and HTN presents to ED c/o diarrhea x2 weeks and dehydration. Denies recent ABX use for abd. pain, No N/V, fever, and urinary symptoms. no abd pain. no bloody stool. no vomiting. decreased PO intake;        HEAD:  Atraumatic, Normocephalic  EYES: EOMI, PERRLA, conjunctiva and sclera clear  HEENT: Moist mucous membranes  NECK: Supple, No JVD  NERVOUS SYSTEM:  Alert & Oriented X3, Motor Strength 5/5 B/L upper and lower extremities; DTRs 2+ intact and symmetric  CHEST/LUNG: Clear to auscultation bilaterally; No rales, rhonchi, wheezing, or rubs  HEART:S1S2 normal, no murmer  ABDOMEN: Soft, Nontender, Nondistended; Bowel sounds present  GENITOURINARY- Voiding, no palpable bladder  EXTREMITIES:  2+ Peripheral Pulses, No clubbing, cyanosis, or edema  MUSCULOSKELTAL- No muscle tenderness, Muscle tone normal, No joint tenderness, no Joint swelling, Joint range of motion-normal  SKIN-no rash, no lesion  PSYCH- Mood stable  LYMPH Node- No palpable lymph node    LABS:                        10.8   4.20  )-----------( 126      ( 15 Jul 2023 08:25 )             31.8     07-15    145  |  117<H>  |  10  ----------------------------<  116<H>  3.4<L>   |  25  |  0.92    Ca    8.2<L>      15 Jul 2023 08:25  Mg     1.9     07-14        Urinalysis Basic - ( 15 Jul 2023 08:25 )    Color: x / Appearance: x / SG: x / pH: x  Gluc: 116 mg/dL / Ketone: x  / Bili: x / Urobili: x   Blood: x / Protein: x / Nitrite: x   Leuk Esterase: x / RBC: x / WBC x   Sq Epi: x / Non Sq Epi: x / Bacteria: x        CAPILLARY BLOOD GLUCOSE                Standing medicine  acetaminophen     Tablet .. 650 milliGRAM(s) Oral every 6 hours PRN  aluminum hydroxide/magnesium hydroxide/simethicone Suspension 30 milliLiter(s) Oral every 4 hours PRN  cefTRIAXone Injectable. 1000 milliGRAM(s) IV Push every 24 hours  dexamethasone/neomycin/polymyxin Ointment 1 Application(s) Left EYE two times a day  dextrose 5%. 1000 milliLiter(s) IV Continuous <Continuous>  gabapentin 600 milliGRAM(s) Oral at bedtime  losartan 25 milliGRAM(s) Oral daily  melatonin 3 milliGRAM(s) Oral at bedtime PRN  mirtazapine 15 milliGRAM(s) Oral at bedtime  ondansetron Injectable 4 milliGRAM(s) IV Push every 8 hours PRN  QUEtiapine 25 milliGRAM(s) Oral at bedtime  sertraline 25 milliGRAM(s) Oral daily      A/P    # Acute metabolic encephalopathy secondary to AMA from diarrhea  - Increase D5W to 100 cc  - Trend BMP  - CT head negative    # Diarrhea resolved      # Hypokalemia  - Replete  - Check magnesium    # UTI  - Continue Rocephin    # Dementia  - Continue Seroquel  - Continue Mirtazapine  - Continue Sertraline    # HTN  - Losartan    # Disposition: Full code  
HOSPITALIST ATTENDING PROGRESS NOTE    Chart and meds reviewed.  Patient seen and examined.    CC: Confusion    Subjective: Baseline confusion, but answering some questions appropriately. No fever. Tolerating po.     All other systems reviewed and found to be negative with the exception of what has been described above.    MEDICATIONS  (STANDING):  cefTRIAXone Injectable. 1000 milliGRAM(s) IV Push every 24 hours  dextrose 5%. 1000 milliLiter(s) (100 mL/Hr) IV Continuous <Continuous>  gabapentin 600 milliGRAM(s) Oral at bedtime  losartan 25 milliGRAM(s) Oral daily  mirtazapine 15 milliGRAM(s) Oral at bedtime  potassium chloride   Powder 40 milliEquivalent(s) Oral two times a day  QUEtiapine 25 milliGRAM(s) Oral at bedtime  sertraline 25 milliGRAM(s) Oral daily    MEDICATIONS  (PRN):  acetaminophen     Tablet .. 650 milliGRAM(s) Oral every 6 hours PRN Temp greater or equal to 38C (100.4F), Mild Pain (1 - 3)  aluminum hydroxide/magnesium hydroxide/simethicone Suspension 30 milliLiter(s) Oral every 4 hours PRN Dyspepsia  melatonin 3 milliGRAM(s) Oral at bedtime PRN Insomnia  ondansetron Injectable 4 milliGRAM(s) IV Push every 8 hours PRN Nausea and/or Vomiting      VITALS:  T(F): 97.5 (07-14-23 @ 08:53), Max: 97.9 (07-14-23 @ 02:28)  HR: 55 (07-14-23 @ 08:53) (55 - 73)  BP: 149/62 (07-14-23 @ 08:53) (149/62 - 192/94)  RR: 18 (07-14-23 @ 08:53) (16 - 18)  SpO2: 96% (07-14-23 @ 08:53) (95% - 98%)  Wt(kg): --      PHYSICAL EXAM:  GEN: Thin, NAD  HEENT:  pupils equal and reactive, EOMI, no oropharyngeal lesions, erythema, exudates, oral thrush  NECK:   supple, no carotid bruits, no palpable lymph nodes, no thyromegaly  CV:  +S1, +S2, regular, no murmurs or rubs  RESP:   lungs clear to auscultation bilaterally, no wheezing, rales, rhonchi, good air entry bilaterally  BREAST:  not examined  GI:  abdomen soft, non-tender, non-distended, normal BS, no bruits, no abdominal masses, no palpable masses  RECTAL:  not examined  :  not examined  MSK:   normal muscle tone, no atrophy, no rigidity, no contractions  EXT:  no clubbing, no cyanosis, no edema, no calf pain, swelling or erythema  VASCULAR:  pulses equal and symmetric in the upper and lower extremities  NEURO:  AAOX1-2, no focal neurological deficits, follows all commands, able to move extremities spontaneously  SKIN:  no ulcers, lesions or rashes    LABS:                            11.7   5.47  )-----------( 135      ( 14 Jul 2023 06:34 )             33.8     07-14    147<H>  |  117<H>  |  13  ----------------------------<  87  3.1<L>   |  28  |  0.97    Ca    8.1<L>      14 Jul 2023 06:34  Mg     1.9     07-12    TPro  6.3  /  Alb  3.1<L>  /  TBili  0.7  /  DBili  x   /  AST  37  /  ALT  40  /  AlkPhos  82  07-12        LIVER FUNCTIONS - ( 12 Jul 2023 18:08 )  Alb: 3.1 g/dL / Pro: 6.3 gm/dL / ALK PHOS: 82 U/L / ALT: 40 U/L / AST: 37 U/L / GGT: x           PT/INR - ( 12 Jul 2023 18:08 )   PT: 14.0 sec;   INR: 1.20 ratio    PTT - ( 12 Jul 2023 18:08 )  PTT:31.1 sec    Urinalysis Basic - ( 14 Jul 2023 06:34 )  Color: x / Appearance: x / SG: x / pH: x  Gluc: 87 mg/dL / Ketone: x  / Bili: x / Urobili: x   Blood: x / Protein: x / Nitrite: x   Leuk Esterase: x / RBC: x / WBC x   Sq Epi: x / Non Sq Epi: x / Bacteria: x        < from: CT Abdomen and Pelvis No Cont (07.12.23 @ 20:44) >  MPRESSION: 3.8 cm gallstone. No definite gallbladder wall thickening. No   pericholecystic fluid. No mechanical bowel obstruction. No colonic wall   thickening.          --- End of Report ---        < end of copied text >  < from: CT Head No Cont (07.12.23 @ 20:44) >  MPRESSION:    No acute intracranial hemorrhage, mass effect, or evidence of acute   vascular territorial infarction. If clinical symptoms persist or worsen,   more sensitive evaluation with brain MRI may be obtained, if no   contraindications exist.    --- End of Report ---      < end of copied text >  < from: Xray Chest 1 View- PORTABLE-Urgent (Xray Chest 1 View- PORTABLE-Urgent .) (07.12.23 @ 21:25) >  Impression:    No acute pulmonary disease.    --- End of Report ---      < end of copied text >

## 2023-07-16 ENCOUNTER — TRANSCRIPTION ENCOUNTER (OUTPATIENT)
Age: 88
End: 2023-07-16

## 2023-07-16 VITALS — HEART RATE: 69 BPM | SYSTOLIC BLOOD PRESSURE: 161 MMHG | DIASTOLIC BLOOD PRESSURE: 65 MMHG

## 2023-07-16 PROCEDURE — 99239 HOSP IP/OBS DSCHRG MGMT >30: CPT

## 2023-07-16 RX ORDER — CHOLECALCIFEROL (VITAMIN D3) 125 MCG
1 CAPSULE ORAL
Qty: 0 | Refills: 0 | DISCHARGE

## 2023-07-16 RX ADMIN — Medication 1 APPLICATION(S): at 06:11

## 2023-07-16 RX ADMIN — Medication 20 MILLIEQUIVALENT(S): at 08:36

## 2023-07-16 RX ADMIN — LOSARTAN POTASSIUM 25 MILLIGRAM(S): 100 TABLET, FILM COATED ORAL at 08:40

## 2023-07-16 RX ADMIN — SERTRALINE 25 MILLIGRAM(S): 25 TABLET, FILM COATED ORAL at 08:41

## 2023-07-16 RX ADMIN — QUETIAPINE FUMARATE 12.5 MILLIGRAM(S): 200 TABLET, FILM COATED ORAL at 08:37

## 2023-07-16 NOTE — DISCHARGE NOTE PROVIDER - CARE PROVIDER_API CALL
Tashi Akbar  Internal Medicine  [address] [place] [address], [address]  [place] [address]  Phone: [phone]  Fax: [phone]  Follow Up Time: 1 week

## 2023-07-16 NOTE — DISCHARGE NOTE PROVIDER - NSDCMRMEDTOKEN_GEN_ALL_CORE_FT
gabapentin 300 mg oral capsule: 2 cap(s) orally once a day (at bedtime)  losartan 25 mg oral tablet: 1 tab(s) orally once a day ***per son - patient is taking 25mg- double checked- pt has 50mg prescribed on 5/16***  mirtazapine 15 mg oral tablet: 1 tab(s) orally once a day (at bedtime)  Multiple Vitamins oral tablet: 1 tab(s) orally once a day  QUEtiapine 25 mg oral tablet: 1 tab(s) orally once a day (at bedtime), As Needed -for agitation   sertraline 25 mg oral tablet: 1 tab(s) orally once a day

## 2023-07-16 NOTE — DISCHARGE NOTE PROVIDER - NSDCCPCAREPLAN_GEN_ALL_CORE_FT
PRINCIPAL DISCHARGE DIAGNOSIS  Diagnosis: Altered mental status  Assessment and Plan of Treatment:       SECONDARY DISCHARGE DIAGNOSES  Diagnosis: Diarrhea  Assessment and Plan of Treatment:     Diagnosis: Acute UTI  Assessment and Plan of Treatment:

## 2023-07-16 NOTE — DISCHARGE NOTE NURSING/CASE MANAGEMENT/SOCIAL WORK - PATIENT PORTAL LINK FT
You can access the FollowMyHealth Patient Portal offered by [facility] by registering at the following website: [email or web address]. By joining [facility]’s FollowMyHealth portal, you will also be able to view your health information using other applications (apps) compatible with our system.

## 2023-07-16 NOTE — DISCHARGE NOTE PROVIDER - HOSPITAL COURSE
·Hospital course- 89 y/o male w/PMHx of BPH, dementia, depression, OA, HLD, and HTN presents to ED c/o diarrhea x2 weeks and dehydration. He was felt to be clinically dehydrated and was given iv fluids which has improved his dehydration status. He also has been treated for possible uti. His mental status as per family member has come down to baseline. He is having episode of confusion and agitations while he is here which is being managed conservatively. Now pt is hemodynamically stable and is being discharged with further mange ment as an outpt, time spent 45 minutes      PAST MEDICAL HISTORY:  Anxiety   BPH (benign prostatic hyperplasia)   Dementia   Depression   Hyperlipidemia   Hypertension   OA (osteoarthritis).     Vital Signs Last 24 Hrs  T(C): 36.5 (13 Jul 2023 08:29), Max: 37.2 (12 Jul 2023 22:00)  T(F): 97.7 (13 Jul 2023 08:29), Max: 98.9 (12 Jul 2023 22:00)  HR: 84 (13 Jul 2023 08:29) (65 - 84)  BP: 169/84 (13 Jul 2023 08:29) (145/81 - 169/84)  BP(mean): 109 (13 Jul 2023 08:29) (75 - 109)  RR: 18 (13 Jul 2023 08:29) (16 - 19)  SpO2: 97% (13 Jul 2023 08:29) (95% - 100%)    Parameters below as of 13 Jul 2023 08:29  Patient On (Oxygen Delivery Method): room air        PHYSICAL EXAM:    GENERAL: NAD  HEENT:  NC/AT, EOMI, PERRLA, No scleral icterus, Moist mucous membranes  NECK: Supple, No JVD  CNS:  Alert, follows command   LUNG: Normal Breath sounds, Clear to auscultation bilaterally, No rales, No rhonchi, No wheezing  HEART: RRR; No murmurs, No rubs  ABDOMEN: +BS, ST/ND/NT  GENITOURINARY: Voiding, Bladder not distended  EXTREMITIES:  2+ Peripheral Pulses, No clubbing, No cyanosis, No tibial edema  MUSCULOSKELTAL: Joints normal ROM, No TTP, No effusion  SKIN: no rashes, no sores                            11.2   6.12  )-----------( 145      ( 12 Jul 2023 18:08 )             32.9     07-13

## 2023-07-16 NOTE — DISCHARGE NOTE NURSING/CASE MANAGEMENT/SOCIAL WORK - NSDCPEFALRISK_GEN_ALL_CORE
For information on Fall & Injury Prevention, visit: [email or web address] OR  [email or web address] OR  https://www.cdc.gov/steadi/patient.html

## 2023-07-17 RX ORDER — PNV NO.95/FERROUS FUM/FOLIC AC 28MG-0.8MG
TABLET ORAL
Refills: 0 | Status: COMPLETED | COMMUNITY
End: 2023-07-17

## 2023-07-17 RX ORDER — LOSARTAN POTASSIUM 25 MG/1
25 TABLET, FILM COATED ORAL
Qty: 90 | Refills: 3 | Status: ACTIVE | COMMUNITY
Start: 2023-05-16

## 2023-07-18 ENCOUNTER — NON-APPOINTMENT (OUTPATIENT)
Age: 88
End: 2023-07-18

## 2023-07-18 NOTE — ED PROVIDER NOTE - SECONDARY DIAGNOSIS.
Detail Level: Simple Additional Notes: Patient consent was obtained to proceed with the visit and recommended plan of care after discussion of all risks and benefits, including the risks of COVID-19 exposure. Fall

## 2023-07-20 ENCOUNTER — APPOINTMENT (OUTPATIENT)
Dept: GERIATRICS | Facility: CLINIC | Age: 88
End: 2023-07-20
Payer: MEDICARE

## 2023-07-20 PROCEDURE — 99496 TRANSJ CARE MGMT HIGH F2F 7D: CPT | Mod: 95

## 2023-07-20 RX ORDER — MULTIVIT-MIN/FOLIC/VIT K/LYCOP 400-300MCG
25 MCG TABLET ORAL DAILY
Refills: 0 | Status: COMPLETED | COMMUNITY
End: 2023-07-20

## 2023-07-20 NOTE — HISTORY OF PRESENT ILLNESS
[No falls in past year] : Patient reported no falls in the past year [Completely Dependent] : Completely dependent. [0] : 2) Feeling down, depressed, or hopeless: Not at all (0) [With Patient/Caregiver] : , with patient/caregiver [I will adhere to the patient's wishes.] : I will adhere to the patient's wishes. [FreeTextEntry1] : JANET DALLAS is a 89 year old man with a PMH of advanced dementia, HTN, insomnia, and gait instability who presents for follow up visit. Patient is accompanied by daughter, Renetta, who provided collateral history.\par \par Since last visit, patient was admitted in the hospital due worsening diarrhea despite using BRAT diet. He was admitted to the hospital and was tx for a UTI and was given Imodium as needed, his hospital course was complicated by agitated delirium requiring 1:1 supervision and his Seroquel dose was increased to 25mg twice a day. He was DC home and he continued to have agitation followed by episode of lethargy. His son held Seroquel and mental status improved.\par \par He has started to work with PT at home. \par \par He will resume day care programs next week 5 days a week and her brother has become their main caretaker. They have hired a HHA at night from 10PM to 6AM. They have noticed pt has become more debilitated. \par \par Insomnia:\par -continues\par -daughter states that patient sleeps throughout the night 1-2 times per week but more often than not, will not fall asleep until 4am and will then sleep until 11am \par -currently taking mirtazipine 15mg, gabapentin 600mg at bedtime and relaxium x 1 tablet \par -patient also has HHAs nightly from 10pm-6am\par \par Leg weakness:\par -trying to go for walks with walker; daughter states that patient requires a lot of encouragement to engage in activity\par -patient went swimming recently at daughter's pool \par \par Dysphagia:\par -occasionally coughing with fluids\par -unclear pneumonia in April was s/t aspiration\par -evaluated at bedside by SLP while in hospital but did not have a formal swallow evaluation\par \par Dementia:\par -sundowning behaviors stable \par -previously went to adult day center 4 times a week\par \par  [AdvancecareDate] : 05/22 [FreeTextEntry4] : daughter Renetta states that she has HCP form- she will obtain copy and bring to next visit\par -no MOLST form completed; briefly discussed patient's wishes given recent hospitalization; Renetta states "I think he would wanted all aggressive measures as long as it were reversible but I am not sure and I need to check his living will"- plan to address at follow up visit

## 2023-07-20 NOTE — PHYSICAL EXAM
[Alert] : alert [Normal Outer Ear/Nose] : the ears and nose were normal in appearance [Both Tympanic Membranes Were Examined] : both tympanic membranes were normal [Normal Appearance] : the appearance of the neck was normal [Supple] : the neck was supple [No Respiratory Distress] : no respiratory distress [No Acc Muscle Use] : no accessory muscle use [Respiration, Rhythm And Depth] : normal respiratory rhythm and effort [Auscultation Breath Sounds / Voice Sounds] : lungs were clear to auscultation bilaterally [Normal S1, S2] : normal S1 and S2 [Heart Rate And Rhythm] : heart rate was normal and rhythm regular [Edema] : edema was not present [Bowel Sounds] : normal bowel sounds [Abdomen Tenderness] : non-tender [Abdomen Soft] : soft [Cervical Lymph Nodes Enlarged Posterior Bilaterally] : posterior cervical [Supraclavicular Lymph Nodes Enlarged Bilaterally] : supraclavicular [Cervical Lymph Nodes Enlarged Anterior Bilaterally] : anterior cervical, supraclavicular [Axillary Lymph Nodes Enlarged Bilaterally] : axillary [No CVA Tenderness] : no CVA  tenderness [No Spinal Tenderness] : no spinal tenderness [Motor Tone] : muscle strength and tone were normal [Normal Color / Pigmentation] : normal skin color and pigmentation [Normal Affect] : the affect was normal [Normal Mood] : the mood was normal [Normal Gait] : abnormal gait [Normal Insight/Judgment] : insight and judgment were not intact [de-identified] : well appearing; answers questions appropriately [FreeTextEntry1] : right eye patch in place

## 2023-07-21 DIAGNOSIS — E78.5 HYPERLIPIDEMIA, UNSPECIFIED: ICD-10-CM

## 2023-07-21 DIAGNOSIS — Z96.651 PRESENCE OF RIGHT ARTIFICIAL KNEE JOINT: ICD-10-CM

## 2023-07-21 DIAGNOSIS — N39.0 URINARY TRACT INFECTION, SITE NOT SPECIFIED: ICD-10-CM

## 2023-07-21 DIAGNOSIS — I10 ESSENTIAL (PRIMARY) HYPERTENSION: ICD-10-CM

## 2023-07-21 DIAGNOSIS — E87.0 HYPEROSMOLALITY AND HYPERNATREMIA: ICD-10-CM

## 2023-07-21 DIAGNOSIS — F03.90 UNSPECIFIED DEMENTIA, UNSPECIFIED SEVERITY, WITHOUT BEHAVIORAL DISTURBANCE, PSYCHOTIC DISTURBANCE, MOOD DISTURBANCE, AND ANXIETY: ICD-10-CM

## 2023-07-21 DIAGNOSIS — G93.41 METABOLIC ENCEPHALOPATHY: ICD-10-CM

## 2023-07-21 DIAGNOSIS — F32.A DEPRESSION, UNSPECIFIED: ICD-10-CM

## 2023-07-21 DIAGNOSIS — R19.7 DIARRHEA, UNSPECIFIED: ICD-10-CM

## 2023-07-21 DIAGNOSIS — E87.6 HYPOKALEMIA: ICD-10-CM

## 2023-07-21 DIAGNOSIS — E86.0 DEHYDRATION: ICD-10-CM

## 2023-07-21 DIAGNOSIS — M19.90 UNSPECIFIED OSTEOARTHRITIS, UNSPECIFIED SITE: ICD-10-CM

## 2023-07-21 DIAGNOSIS — F41.9 ANXIETY DISORDER, UNSPECIFIED: ICD-10-CM

## 2023-07-21 DIAGNOSIS — N40.1 BENIGN PROSTATIC HYPERPLASIA WITH LOWER URINARY TRACT SYMPTOMS: ICD-10-CM

## 2023-09-27 ENCOUNTER — RX RENEWAL (OUTPATIENT)
Age: 88
End: 2023-09-27

## 2023-11-10 ENCOUNTER — NON-APPOINTMENT (OUTPATIENT)
Age: 88
End: 2023-11-10

## 2023-11-11 ENCOUNTER — EMERGENCY (EMERGENCY)
Facility: HOSPITAL | Age: 88
LOS: 0 days | Discharge: HOME | End: 2023-11-11
Attending: EMERGENCY MEDICINE
Payer: MEDICARE

## 2023-11-11 VITALS
SYSTOLIC BLOOD PRESSURE: 175 MMHG | RESPIRATION RATE: 18 BRPM | DIASTOLIC BLOOD PRESSURE: 73 MMHG | HEART RATE: 55 BPM | OXYGEN SATURATION: 99 %

## 2023-11-11 VITALS — HEIGHT: 69 IN | WEIGHT: 184.97 LBS

## 2023-11-11 DIAGNOSIS — I44.0 ATRIOVENTRICULAR BLOCK, FIRST DEGREE: ICD-10-CM

## 2023-11-11 DIAGNOSIS — Z96.651 PRESENCE OF RIGHT ARTIFICIAL KNEE JOINT: Chronic | ICD-10-CM

## 2023-11-11 DIAGNOSIS — R05.9 COUGH, UNSPECIFIED: ICD-10-CM

## 2023-11-11 DIAGNOSIS — Z90.89 ACQUIRED ABSENCE OF OTHER ORGANS: Chronic | ICD-10-CM

## 2023-11-11 DIAGNOSIS — Z20.822 CONTACT WITH AND (SUSPECTED) EXPOSURE TO COVID-19: ICD-10-CM

## 2023-11-11 DIAGNOSIS — J20.9 ACUTE BRONCHITIS, UNSPECIFIED: ICD-10-CM

## 2023-11-11 DIAGNOSIS — R00.1 BRADYCARDIA, UNSPECIFIED: ICD-10-CM

## 2023-11-11 LAB
ALBUMIN SERPL ELPH-MCNC: 3.5 G/DL — SIGNIFICANT CHANGE UP (ref 3.3–5)
ALBUMIN SERPL ELPH-MCNC: 3.5 G/DL — SIGNIFICANT CHANGE UP (ref 3.3–5)
ALP SERPL-CCNC: 69 U/L — SIGNIFICANT CHANGE UP (ref 40–120)
ALP SERPL-CCNC: 69 U/L — SIGNIFICANT CHANGE UP (ref 40–120)
ALT FLD-CCNC: 15 U/L — SIGNIFICANT CHANGE UP (ref 12–78)
ALT FLD-CCNC: 15 U/L — SIGNIFICANT CHANGE UP (ref 12–78)
ANION GAP SERPL CALC-SCNC: 5 MMOL/L — SIGNIFICANT CHANGE UP (ref 5–17)
ANION GAP SERPL CALC-SCNC: 5 MMOL/L — SIGNIFICANT CHANGE UP (ref 5–17)
APPEARANCE UR: CLEAR — SIGNIFICANT CHANGE UP
APPEARANCE UR: CLEAR — SIGNIFICANT CHANGE UP
APTT BLD: 31.9 SEC — SIGNIFICANT CHANGE UP (ref 24.5–35.6)
APTT BLD: 31.9 SEC — SIGNIFICANT CHANGE UP (ref 24.5–35.6)
AST SERPL-CCNC: 17 U/L — SIGNIFICANT CHANGE UP (ref 15–37)
AST SERPL-CCNC: 17 U/L — SIGNIFICANT CHANGE UP (ref 15–37)
BACTERIA # UR AUTO: NEGATIVE /HPF — SIGNIFICANT CHANGE UP
BACTERIA # UR AUTO: NEGATIVE /HPF — SIGNIFICANT CHANGE UP
BASOPHILS # BLD AUTO: 0.03 K/UL — SIGNIFICANT CHANGE UP (ref 0–0.2)
BASOPHILS # BLD AUTO: 0.03 K/UL — SIGNIFICANT CHANGE UP (ref 0–0.2)
BASOPHILS NFR BLD AUTO: 0.6 % — SIGNIFICANT CHANGE UP (ref 0–2)
BASOPHILS NFR BLD AUTO: 0.6 % — SIGNIFICANT CHANGE UP (ref 0–2)
BILIRUB SERPL-MCNC: 0.9 MG/DL — SIGNIFICANT CHANGE UP (ref 0.2–1.2)
BILIRUB SERPL-MCNC: 0.9 MG/DL — SIGNIFICANT CHANGE UP (ref 0.2–1.2)
BILIRUB UR-MCNC: NEGATIVE — SIGNIFICANT CHANGE UP
BILIRUB UR-MCNC: NEGATIVE — SIGNIFICANT CHANGE UP
BUN SERPL-MCNC: 25 MG/DL — HIGH (ref 7–23)
BUN SERPL-MCNC: 25 MG/DL — HIGH (ref 7–23)
CALCIUM SERPL-MCNC: 8.9 MG/DL — SIGNIFICANT CHANGE UP (ref 8.5–10.1)
CALCIUM SERPL-MCNC: 8.9 MG/DL — SIGNIFICANT CHANGE UP (ref 8.5–10.1)
CAST: 0 /LPF — SIGNIFICANT CHANGE UP (ref 0–4)
CAST: 0 /LPF — SIGNIFICANT CHANGE UP (ref 0–4)
CHLORIDE SERPL-SCNC: 109 MMOL/L — HIGH (ref 96–108)
CHLORIDE SERPL-SCNC: 109 MMOL/L — HIGH (ref 96–108)
CO2 SERPL-SCNC: 26 MMOL/L — SIGNIFICANT CHANGE UP (ref 22–31)
CO2 SERPL-SCNC: 26 MMOL/L — SIGNIFICANT CHANGE UP (ref 22–31)
COLOR SPEC: YELLOW — SIGNIFICANT CHANGE UP
COLOR SPEC: YELLOW — SIGNIFICANT CHANGE UP
CREAT SERPL-MCNC: 1.15 MG/DL — SIGNIFICANT CHANGE UP (ref 0.5–1.3)
CREAT SERPL-MCNC: 1.15 MG/DL — SIGNIFICANT CHANGE UP (ref 0.5–1.3)
DIFF PNL FLD: NEGATIVE — SIGNIFICANT CHANGE UP
DIFF PNL FLD: NEGATIVE — SIGNIFICANT CHANGE UP
EGFR: 60 ML/MIN/1.73M2 — SIGNIFICANT CHANGE UP
EGFR: 60 ML/MIN/1.73M2 — SIGNIFICANT CHANGE UP
EOSINOPHIL # BLD AUTO: 0.11 K/UL — SIGNIFICANT CHANGE UP (ref 0–0.5)
EOSINOPHIL # BLD AUTO: 0.11 K/UL — SIGNIFICANT CHANGE UP (ref 0–0.5)
EOSINOPHIL NFR BLD AUTO: 2.2 % — SIGNIFICANT CHANGE UP (ref 0–6)
EOSINOPHIL NFR BLD AUTO: 2.2 % — SIGNIFICANT CHANGE UP (ref 0–6)
GLUCOSE SERPL-MCNC: 102 MG/DL — HIGH (ref 70–99)
GLUCOSE SERPL-MCNC: 102 MG/DL — HIGH (ref 70–99)
GLUCOSE UR QL: NEGATIVE MG/DL — SIGNIFICANT CHANGE UP
GLUCOSE UR QL: NEGATIVE MG/DL — SIGNIFICANT CHANGE UP
HCT VFR BLD CALC: 37.1 % — LOW (ref 39–50)
HCT VFR BLD CALC: 37.1 % — LOW (ref 39–50)
HGB BLD-MCNC: 12.5 G/DL — LOW (ref 13–17)
HGB BLD-MCNC: 12.5 G/DL — LOW (ref 13–17)
IMM GRANULOCYTES NFR BLD AUTO: 0.2 % — SIGNIFICANT CHANGE UP (ref 0–0.9)
IMM GRANULOCYTES NFR BLD AUTO: 0.2 % — SIGNIFICANT CHANGE UP (ref 0–0.9)
INR BLD: 1.01 RATIO — SIGNIFICANT CHANGE UP (ref 0.85–1.18)
INR BLD: 1.01 RATIO — SIGNIFICANT CHANGE UP (ref 0.85–1.18)
KETONES UR-MCNC: NEGATIVE MG/DL — SIGNIFICANT CHANGE UP
KETONES UR-MCNC: NEGATIVE MG/DL — SIGNIFICANT CHANGE UP
LACTATE SERPL-SCNC: 0.8 MMOL/L — SIGNIFICANT CHANGE UP (ref 0.7–2)
LACTATE SERPL-SCNC: 0.8 MMOL/L — SIGNIFICANT CHANGE UP (ref 0.7–2)
LEUKOCYTE ESTERASE UR-ACNC: ABNORMAL
LEUKOCYTE ESTERASE UR-ACNC: ABNORMAL
LYMPHOCYTES # BLD AUTO: 0.95 K/UL — LOW (ref 1–3.3)
LYMPHOCYTES # BLD AUTO: 0.95 K/UL — LOW (ref 1–3.3)
LYMPHOCYTES # BLD AUTO: 19.3 % — SIGNIFICANT CHANGE UP (ref 13–44)
LYMPHOCYTES # BLD AUTO: 19.3 % — SIGNIFICANT CHANGE UP (ref 13–44)
MCHC RBC-ENTMCNC: 33.2 PG — SIGNIFICANT CHANGE UP (ref 27–34)
MCHC RBC-ENTMCNC: 33.2 PG — SIGNIFICANT CHANGE UP (ref 27–34)
MCHC RBC-ENTMCNC: 33.7 GM/DL — SIGNIFICANT CHANGE UP (ref 32–36)
MCHC RBC-ENTMCNC: 33.7 GM/DL — SIGNIFICANT CHANGE UP (ref 32–36)
MCV RBC AUTO: 98.7 FL — SIGNIFICANT CHANGE UP (ref 80–100)
MCV RBC AUTO: 98.7 FL — SIGNIFICANT CHANGE UP (ref 80–100)
MONOCYTES # BLD AUTO: 0.65 K/UL — SIGNIFICANT CHANGE UP (ref 0–0.9)
MONOCYTES # BLD AUTO: 0.65 K/UL — SIGNIFICANT CHANGE UP (ref 0–0.9)
MONOCYTES NFR BLD AUTO: 13.2 % — SIGNIFICANT CHANGE UP (ref 2–14)
MONOCYTES NFR BLD AUTO: 13.2 % — SIGNIFICANT CHANGE UP (ref 2–14)
NEUTROPHILS # BLD AUTO: 3.17 K/UL — SIGNIFICANT CHANGE UP (ref 1.8–7.4)
NEUTROPHILS # BLD AUTO: 3.17 K/UL — SIGNIFICANT CHANGE UP (ref 1.8–7.4)
NEUTROPHILS NFR BLD AUTO: 64.5 % — SIGNIFICANT CHANGE UP (ref 43–77)
NEUTROPHILS NFR BLD AUTO: 64.5 % — SIGNIFICANT CHANGE UP (ref 43–77)
NITRITE UR-MCNC: NEGATIVE — SIGNIFICANT CHANGE UP
NITRITE UR-MCNC: NEGATIVE — SIGNIFICANT CHANGE UP
PH UR: 7 — SIGNIFICANT CHANGE UP (ref 5–8)
PH UR: 7 — SIGNIFICANT CHANGE UP (ref 5–8)
PLATELET # BLD AUTO: 159 K/UL — SIGNIFICANT CHANGE UP (ref 150–400)
PLATELET # BLD AUTO: 159 K/UL — SIGNIFICANT CHANGE UP (ref 150–400)
POTASSIUM SERPL-MCNC: 4.5 MMOL/L — SIGNIFICANT CHANGE UP (ref 3.5–5.3)
POTASSIUM SERPL-MCNC: 4.5 MMOL/L — SIGNIFICANT CHANGE UP (ref 3.5–5.3)
POTASSIUM SERPL-SCNC: 4.5 MMOL/L — SIGNIFICANT CHANGE UP (ref 3.5–5.3)
POTASSIUM SERPL-SCNC: 4.5 MMOL/L — SIGNIFICANT CHANGE UP (ref 3.5–5.3)
PROT SERPL-MCNC: 7.5 GM/DL — SIGNIFICANT CHANGE UP (ref 6–8.3)
PROT SERPL-MCNC: 7.5 GM/DL — SIGNIFICANT CHANGE UP (ref 6–8.3)
PROT UR-MCNC: NEGATIVE MG/DL — SIGNIFICANT CHANGE UP
PROT UR-MCNC: NEGATIVE MG/DL — SIGNIFICANT CHANGE UP
PROTHROM AB SERPL-ACNC: 11.4 SEC — SIGNIFICANT CHANGE UP (ref 9.5–13)
PROTHROM AB SERPL-ACNC: 11.4 SEC — SIGNIFICANT CHANGE UP (ref 9.5–13)
RAPID RVP RESULT: SIGNIFICANT CHANGE UP
RAPID RVP RESULT: SIGNIFICANT CHANGE UP
RBC # BLD: 3.76 M/UL — LOW (ref 4.2–5.8)
RBC # BLD: 3.76 M/UL — LOW (ref 4.2–5.8)
RBC # FLD: 12.1 % — SIGNIFICANT CHANGE UP (ref 10.3–14.5)
RBC # FLD: 12.1 % — SIGNIFICANT CHANGE UP (ref 10.3–14.5)
RBC CASTS # UR COMP ASSIST: 0 /HPF — SIGNIFICANT CHANGE UP (ref 0–4)
RBC CASTS # UR COMP ASSIST: 0 /HPF — SIGNIFICANT CHANGE UP (ref 0–4)
SARS-COV-2 RNA SPEC QL NAA+PROBE: SIGNIFICANT CHANGE UP
SARS-COV-2 RNA SPEC QL NAA+PROBE: SIGNIFICANT CHANGE UP
SODIUM SERPL-SCNC: 140 MMOL/L — SIGNIFICANT CHANGE UP (ref 135–145)
SODIUM SERPL-SCNC: 140 MMOL/L — SIGNIFICANT CHANGE UP (ref 135–145)
SP GR SPEC: 1.01 — SIGNIFICANT CHANGE UP (ref 1–1.03)
SP GR SPEC: 1.01 — SIGNIFICANT CHANGE UP (ref 1–1.03)
SQUAMOUS # UR AUTO: 0 /HPF — SIGNIFICANT CHANGE UP (ref 0–5)
SQUAMOUS # UR AUTO: 0 /HPF — SIGNIFICANT CHANGE UP (ref 0–5)
UROBILINOGEN FLD QL: 0.2 MG/DL — SIGNIFICANT CHANGE UP (ref 0.2–1)
UROBILINOGEN FLD QL: 0.2 MG/DL — SIGNIFICANT CHANGE UP (ref 0.2–1)
WBC # BLD: 4.92 K/UL — SIGNIFICANT CHANGE UP (ref 3.8–10.5)
WBC # BLD: 4.92 K/UL — SIGNIFICANT CHANGE UP (ref 3.8–10.5)
WBC # FLD AUTO: 4.92 K/UL — SIGNIFICANT CHANGE UP (ref 3.8–10.5)
WBC # FLD AUTO: 4.92 K/UL — SIGNIFICANT CHANGE UP (ref 3.8–10.5)
WBC UR QL: 0 /HPF — SIGNIFICANT CHANGE UP (ref 0–5)
WBC UR QL: 0 /HPF — SIGNIFICANT CHANGE UP (ref 0–5)

## 2023-11-11 PROCEDURE — 81001 URINALYSIS AUTO W/SCOPE: CPT

## 2023-11-11 PROCEDURE — 99285 EMERGENCY DEPT VISIT HI MDM: CPT

## 2023-11-11 PROCEDURE — 85610 PROTHROMBIN TIME: CPT

## 2023-11-11 PROCEDURE — 0225U NFCT DS DNA&RNA 21 SARSCOV2: CPT

## 2023-11-11 PROCEDURE — 87040 BLOOD CULTURE FOR BACTERIA: CPT

## 2023-11-11 PROCEDURE — 99285 EMERGENCY DEPT VISIT HI MDM: CPT | Mod: 25

## 2023-11-11 PROCEDURE — 93010 ELECTROCARDIOGRAM REPORT: CPT

## 2023-11-11 PROCEDURE — 93005 ELECTROCARDIOGRAM TRACING: CPT

## 2023-11-11 PROCEDURE — 71045 X-RAY EXAM CHEST 1 VIEW: CPT

## 2023-11-11 PROCEDURE — 71045 X-RAY EXAM CHEST 1 VIEW: CPT | Mod: 26

## 2023-11-11 PROCEDURE — 85730 THROMBOPLASTIN TIME PARTIAL: CPT

## 2023-11-11 PROCEDURE — 85025 COMPLETE CBC W/AUTO DIFF WBC: CPT

## 2023-11-11 PROCEDURE — 36415 COLL VENOUS BLD VENIPUNCTURE: CPT

## 2023-11-11 PROCEDURE — 83605 ASSAY OF LACTIC ACID: CPT

## 2023-11-11 PROCEDURE — 80053 COMPREHEN METABOLIC PANEL: CPT

## 2023-11-11 PROCEDURE — 87086 URINE CULTURE/COLONY COUNT: CPT

## 2023-11-11 RX ORDER — LOSARTAN POTASSIUM 100 MG/1
25 TABLET, FILM COATED ORAL ONCE
Refills: 0 | Status: COMPLETED | OUTPATIENT
Start: 2023-11-11 | End: 2023-11-11

## 2023-11-11 RX ORDER — AZITHROMYCIN 500 MG/1
1 TABLET, FILM COATED ORAL
Qty: 6 | Refills: 0
Start: 2023-11-11 | End: 2023-11-16

## 2023-11-11 RX ORDER — SODIUM CHLORIDE 9 MG/ML
3 INJECTION INTRAMUSCULAR; INTRAVENOUS; SUBCUTANEOUS ONCE
Refills: 0 | Status: COMPLETED | OUTPATIENT
Start: 2023-11-11 | End: 2023-11-11

## 2023-11-11 RX ADMIN — LOSARTAN POTASSIUM 25 MILLIGRAM(S): 100 TABLET, FILM COATED ORAL at 19:31

## 2023-11-11 RX ADMIN — SODIUM CHLORIDE 3 MILLILITER(S): 9 INJECTION INTRAMUSCULAR; INTRAVENOUS; SUBCUTANEOUS at 16:16

## 2023-11-11 NOTE — ED PROVIDER NOTE - CARE PLAN
1 Principal Discharge DX:	Acute bronchitis   Principal Discharge DX:	Acute bronchitis  Secondary Diagnosis:	Cough

## 2023-11-11 NOTE — ED PROVIDER NOTE - RESPIRATORY, MLM
crackles left base, no accessory muscle usage crackles left base, no accessory muscle usage, normal respirations.

## 2023-11-11 NOTE — ED PROVIDER NOTE - PATIENT PORTAL LINK FT
None known
You can access the FollowMyHealth Patient Portal offered by [facility] by registering at the following website: [email or web address]. By joining [facility]’s FollowMyHealth portal, you will also be able to view your health information using other applications (apps) compatible with our system.

## 2023-11-11 NOTE — ED PROVIDER NOTE - CLINICAL SUMMARY MEDICAL DECISION MAKING FREE TEXT BOX
89 yo male w/PMHx of of BPH, dementia, depression, OA, HLD, and HTN presents to the ED c/o cough since yesterday. Pt was seen at  where he received a CXR that reportedly showed he had b/l lower lob infiltrate, rapid flu negative, rapid COVID negative. Denies SOB. NKDA. No fever. Pt poorly expectorate. No respiratory distress. Pt mildly ill, nontoxic. Plan: pt does not meet sepsis criteria but requires infectious workup. Will get EKG, CXR, labs including blood cultures, lactate, RVP/COVID swab, urine, rectal temp. Monitor, observe, reassess. 89 yo male w/PMHx of of BPH, dementia, depression, OA, HLD, and HTN presents to the ED c/o cough since yesterday. Pt was seen at  where he received a CXR that reportedly showed he had b/l lower lob infiltrate, rapid flu negative, rapid COVID negative. Denies SOB. NKDA. No fever. Pt poorly expectorate. No respiratory distress. Pt mildly ill, nontoxic.   Plan: pt does not meet sepsis criteria but requires infectious workup. Will get EKG, CXR, labs including blood cultures, lactate, RVP/COVID swab, urine, rectal temp. Monitor, observe, reassess.    18:18, C MD Ada:  Labs unremarkable.  CXR wet read TRISTAN.  Pt w/o hypoxia nor any respiratory distress.  Results d/w pt & son at bedside, they expressed their understanding & agree with DC home on po Z-pack, 1st dose in ED. 89 yo male w/PMHx of of BPH, dementia, depression, OA, HLD, and HTN presents to the ED c/o cough since yesterday. Pt was seen at [facility] where he received a CXR that reportedly showed he had b/l lower lobe infiltrates, rapid flu negative, rapid COVID negative. Denies SOB. NKDA. No fever. Pt poorly expectorates. No respiratory distress. Pt mildly ill, non-toxic.   Plan: pt does not meet sepsis criteria but requires infectious workup. Will get EKG, CXR, labs including blood cultures, lactate, RVP/COVID swab, urine, rectal temp. Monitor, observe, reassess.    18:18, C MD Ada:  Labs unremarkable.  CXR wet read TRISTAN.  Pt w/o hypoxia nor any respiratory distress.  Results d/w pt & son at bedside, they expressed their understanding & agree with DC home on po Z-pack, 1st dose in ED.

## 2023-11-11 NOTE — ED PROVIDER NOTE - ENMT, MLM
Airway patent, Nasal mucosa clear. Mouth with normal mucosa. Throat has no vesicles, no oropharyngeal exudates and uvula is midline. oropharynx clear, Mucous membranes barely moist

## 2023-11-11 NOTE — ED PROVIDER NOTE - OBJECTIVE STATEMENT
89 yo male w/PMHx of of BPH, dementia, depression, OA, HLD, and HTN presents to the ED c/o cough since yesterday. Pt was seen at  where he received a CXR that showed he had b/l lower lob infiltrate. Rapid flu negative, rapid COVID negative. Pt was referred to the ED for further workup and evaluation. Denies SOB. NKDA. No fever. Pt poorly expectorate. No respiratory distress last night. Pt has been eating normally. Denies CP. Pt is able to ambulate as normal. Wife sick with cough past 2-3 weeks and was started on z-pack yesterday.  PMD: Dr. Phillips 89 yo male w/ PMHx of of BPH, dementia, depression, OA, HLD, and HTN, BIB pvt car to the ED c/o cough since yesterday. Pt was seen at [facility] where he received a CXR that showed he had b/l lower lobe infiltrate, rapid flu negative, rapid COVID negative. Pt was referred to the ED for further workup and management. Denies SOB. NKDA. No fever. Pt poorly expectorates. No respiratory distress since last night. Pt has been eating normally. Denies CP. Pt is able to ambulate as normal. Wife sick with cough past 2-3 weeks and was started on Z-pack yesterday.  PMD: Dr. Phillips

## 2023-11-11 NOTE — ED PROVIDER NOTE - EYES, MLM
chronic blind right eye, wears a patch, left eye: PERRL, EOMI chronic blind right eye, wears a patch.  left eye: PERRL, EOMI

## 2023-11-11 NOTE — ED PROVIDER NOTE - NSFOLLOWUPINSTRUCTIONS_ED_ALL_ED_FT
Take Zithromax antibiotic as prescribed.  Avoid physical overexertion.  Follow up this week with your own doctor.      Acute Bronchitis, Adult  A comparison between normal and swollen airways, or bronchi, in the lungs.  Acute bronchitis is when air tubes in the lungs (bronchi) suddenly get swollen. The condition can make it hard for you to breathe. In adults, acute bronchitis usually goes away within 2 weeks. A cough caused by bronchitis may last up to 3 weeks. Smoking, allergies, and asthma can make the condition worse.    What are the causes?  Germs that cause cold and flu (viruses). The most common cause of this condition is the virus that causes the common cold.  Bacteria.  Substances that bother (irritate) the lungs, including:  Smoke from cigarettes and other types of tobacco.  Dust and pollen.  Fumes from chemicals, gases, or burned fuel.  Indoor or outdoor air pollution.  What increases the risk?  A weak body's defense system. This is also called the immune system.  Any condition that affects your lungs and breathing, such as asthma.  What are the signs or symptoms?  A cough.  Coughing up clear, yellow, or green mucus.  Making high-pitched whistling sounds when you breathe, most often when you breathe out (wheezing).  Runny or stuffy nose.  Having too much mucus in your lungs (chest congestion).  Shortness of breath.  Body aches.  A sore throat.  How is this treated?  Acute bronchitis may go away over time without treatment. Your doctor may tell you to:  Drink more fluids. This will help thin your mucus so it is easier to cough up.  Use a device that gets medicine into your lungs (inhaler).  Use a vaporizer or a humidifier. These are machines that add water to the air. This helps with coughing and poor breathing.  Take a medicine that thins mucus and helps clear it from your lungs.  Take a medicine that prevents or stops coughing.  It is not common to take an antibiotic medicine for this condition.    Follow these instructions at home:  A do not smoke cigarettes sign.  Take over-the-counter and prescription medicines only as told by your doctor.  Use an inhaler, vaporizer, or humidifier as told by your doctor.  Take two teaspoons (10 mL) of honey at bedtime. This helps lessen your coughing at night.  Drink enough fluid to keep your pee (urine) pale yellow.  Do not smoke or use any products that contain nicotine or tobacco. If you need help quitting, ask your doctor.  Get a lot of rest.  Return to your normal activities when your doctor says that it is safe.  Keep all follow-up visits.  How is this prevented?  Washing hands with soap and water.  Wash your hands often with soap and water for at least 20 seconds. If you cannot use soap and water, use hand .  Avoid contact with people who have cold symptoms.  Try not to touch your mouth, nose, or eyes with your hands.  Avoid breathing in smoke or chemical fumes.  Make sure to get the flu shot every year.  Contact a doctor if:  Your symptoms do not get better in 2 weeks.  You have trouble coughing up the mucus.  Your cough keeps you awake at night.  You have a fever.  Get help right away if:  You cough up blood.  You have chest pain.  You have very bad shortness of breath.  You faint or keep feeling like you are going to faint.  You have a very bad headache.  Your fever or chills get worse.  These symptoms may be an emergency. Get help right away. Call your local emergency services (911 in the [place]  Do not wait to see if the symptoms will go away.  Do not drive yourself to the hospital.  Summary  Acute bronchitis is when air tubes in the lungs (bronchi) suddenly get swollen. In adults, acute bronchitis usually goes away within 2 weeks.  Drink more fluids. This will help thin your mucus so it is easier to cough up.  Take over-the-counter and prescription medicines only as told by your doctor.  Contact a doctor if your symptoms do not improve after 2 weeks of treatment.  This information is not intended to replace advice given to you by your health care provider. Make sure you discuss any questions you have with your health care provider.        Cough, Adult  Coughing is a reflex that clears your throat and your airways (respiratory system). Coughing helps to heal and protect your lungs. It is normal to cough occasionally, but a cough that happens with other symptoms or lasts a long time may be a sign of a condition that needs treatment. An acute cough may only last 2–3 weeks, while a chronic cough may last 8 or more weeks.    Coughing is commonly caused by:  Infection of the respiratory systemby viruses or bacteria.  Breathing in substances that irritate your lungs.  Allergies.  Asthma.  Mucus that runs down the back of your throat (postnasal drip).  Smoking.  Acid backing up from the stomach into the esophagus (gastroesophageal reflux).  Certain medicines.  Chronic lung problems.  Other medical conditions such as heart failure or a blood clot in the lung (pulmonary embolism).  Follow these instructions at home:  Medicines    Take over-the-counter and prescription medicines only as told by your health care provider.  Talk with your health care provider before you take a cough suppressant medicine.  Lifestyle    Avoid cigarette smoke. Do not use any products that contain nicotine or tobacco, such as cigarettes, e-cigarettes, and chewing tobacco. If you need help quitting, ask your health care provider.  Drink enough fluid to keep your urine pale yellow.  Avoid caffeine.  Do not drink alcohol if your health care provider tells you not to drink.  General instructions      Pay close attention to changes in your cough. Tell your health care provider about them.  Always cover your mouth when you cough.  Avoid things that make you cough, such as perfume, candles, cleaning products, or campfire or tobacco smoke.  If the air is dry, use a cool mist vaporizer or humidifier in your bedroom or your home to help loosen secretions.  If your cough is worse at night, try to sleep in a semi-upright position.  Rest as needed.  Keep all follow-up visits as told by your health care provider. This is important.  Contact a health care provider if you:  Have new symptoms.  Cough up pus.  Have a cough that does not get better after 2–3 weeks or gets worse.  Cannot control your cough with cough suppressant medicines and you are losing sleep.  Have pain that gets worse or pain that is not helped with medicine.  Have a fever.  Have unexplained weight loss.  Have night sweats.  Get help right away if:  You cough up blood.  You have difficulty breathing.  Your heartbeat is very fast.  These symptoms may represent a serious problem that is an emergency. Do not wait to see if the symptoms will go away. Get medical help right away. Call your local emergency services (911 in the [place] Do not drive yourself to the hospital.    Summary  Coughing is a reflex that clears your throat and your airways. It is normal to cough occasionally, but a cough that happens with other symptoms or lasts a long time may be a sign of a condition that needs treatment.  Take over-the-counter and prescription medicines only as told by your health care provider.  Always cover your mouth when you cough.  Contact a health care provider if you have new symptoms or a cough that does not get better after 2–3 weeks or gets worse.  This information is not intended to replace advice given to you by your health care provider. Make sure you discuss any questions you have with your health care provider.

## 2023-11-11 NOTE — ED PROVIDER NOTE - MUSCULOSKELETAL, MLM
Spine appears normal, range of motion is not limited, no muscle or joint tenderness, MAEx4, no focal swelling or tenderness

## 2023-11-11 NOTE — ED PROVIDER NOTE - NEUROLOGICAL, MLM
Alert and oriented to self and place, no focal deficits, no motor or sensory deficits. 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 CNs intact expect for chronic right eye blindness, normal speech

## 2023-11-11 NOTE — ED ADULT NURSE NOTE - OBJECTIVE STATEMENT
Pt arrives to ED from urgent care with diagnosis of PNA confirmed on CXR. Pt complaining of cough. Hx htn, and dementia

## 2023-11-11 NOTE — ED ADULT TRIAGE NOTE - CHIEF COMPLAINT QUOTE
Pt arrives to ED from urgent care with diagnosis of PNA confirmed on CXR. Pt complaining of cough. Hx htn.

## 2023-11-11 NOTE — ED ADULT NURSE NOTE - NSFALLHARMRISKINTERV_ED_ALL_ED

## 2023-11-17 LAB
CULTURE RESULTS: SIGNIFICANT CHANGE UP
SPECIMEN SOURCE: SIGNIFICANT CHANGE UP

## 2023-12-11 ENCOUNTER — RX RENEWAL (OUTPATIENT)
Age: 88
End: 2023-12-11

## 2024-02-06 ENCOUNTER — NON-APPOINTMENT (OUTPATIENT)
Age: 89
End: 2024-02-06

## 2024-02-09 ENCOUNTER — RX RENEWAL (OUTPATIENT)
Age: 89
End: 2024-02-09

## 2024-03-04 ENCOUNTER — RX RENEWAL (OUTPATIENT)
Age: 89
End: 2024-03-04

## 2024-03-05 NOTE — ED ADULT NURSE NOTE - NSINTERVENTIONOPT_GEN_ALL_ED
Patient c/o abscess in front of her right ear on face for 1 week.    Hourly Rounding/Enhanced Supervision

## 2024-03-29 ENCOUNTER — APPOINTMENT (OUTPATIENT)
Dept: GERIATRICS | Facility: CLINIC | Age: 89
End: 2024-03-29
Payer: MEDICARE

## 2024-03-29 VITALS
HEART RATE: 58 BPM | SYSTOLIC BLOOD PRESSURE: 117 MMHG | TEMPERATURE: 98.2 F | BODY MASS INDEX: 24.98 KG/M2 | RESPIRATION RATE: 14 BRPM | DIASTOLIC BLOOD PRESSURE: 66 MMHG | WEIGHT: 169.13 LBS

## 2024-03-29 DIAGNOSIS — R19.7 DIARRHEA, UNSPECIFIED: ICD-10-CM

## 2024-03-29 DIAGNOSIS — R26.81 UNSTEADINESS ON FEET: ICD-10-CM

## 2024-03-29 DIAGNOSIS — Z71.89 OTHER SPECIFIED COUNSELING: ICD-10-CM

## 2024-03-29 DIAGNOSIS — G47.00 INSOMNIA, UNSPECIFIED: ICD-10-CM

## 2024-03-29 DIAGNOSIS — F03.C0 UNSPECIFIED DEMENTIA, SEVERE, WITHOUT BEHAVIORAL DISTURBANCE, PSYCHOTIC DISTURBANCE, MOOD DISTURBANCE, AND ANXIETY: ICD-10-CM

## 2024-03-29 PROCEDURE — 99214 OFFICE O/P EST MOD 30 MIN: CPT

## 2024-03-29 PROCEDURE — G2211 COMPLEX E/M VISIT ADD ON: CPT

## 2024-03-29 RX ORDER — SERTRALINE 25 MG/1
25 TABLET, FILM COATED ORAL
Qty: 90 | Refills: 3 | Status: ACTIVE | COMMUNITY
Start: 2022-11-21 | End: 1900-01-01

## 2024-03-29 RX ORDER — NEOMYCIN AND POLYMYXIN B SULFATES AND DEXAMETHASONE 3.5; 10000; 1 MG/G; [IU]/G; MG/G
3.5-10000-0.1 OINTMENT OPHTHALMIC
Refills: 0 | Status: COMPLETED | COMMUNITY
Start: 2023-04-04 | End: 2024-03-29

## 2024-03-29 RX ORDER — QUETIAPINE FUMARATE 25 MG/1
25 TABLET ORAL
Qty: 90 | Refills: 3 | Status: ACTIVE | COMMUNITY
Start: 2021-09-27 | End: 1900-01-01

## 2024-03-29 RX ORDER — GABAPENTIN 600 MG/1
600 TABLET, COATED ORAL
Qty: 180 | Refills: 3 | Status: ACTIVE | COMMUNITY
Start: 2022-02-25 | End: 1900-01-01

## 2024-03-29 RX ORDER — MIRTAZAPINE 15 MG/1
15 TABLET, FILM COATED ORAL
Qty: 90 | Refills: 3 | Status: ACTIVE | COMMUNITY
Start: 2021-11-15 | End: 1900-01-01

## 2024-03-29 NOTE — PHYSICAL EXAM
[Alert] : alert [Normal Outer Ear/Nose] : the ears and nose were normal in appearance [Both Tympanic Membranes Were Examined] : both tympanic membranes were normal [Normal Appearance] : the appearance of the neck was normal [No Respiratory Distress] : no respiratory distress [Supple] : the neck was supple [No Acc Muscle Use] : no accessory muscle use [Respiration, Rhythm And Depth] : normal respiratory rhythm and effort [Auscultation Breath Sounds / Voice Sounds] : lungs were clear to auscultation bilaterally [Normal S1, S2] : normal S1 and S2 [Heart Rate And Rhythm] : heart rate was normal and rhythm regular [Edema] : edema was not present [Bowel Sounds] : normal bowel sounds [Abdomen Tenderness] : non-tender [Abdomen Soft] : soft [Cervical Lymph Nodes Enlarged Posterior Bilaterally] : posterior cervical [Supraclavicular Lymph Nodes Enlarged Bilaterally] : supraclavicular [Cervical Lymph Nodes Enlarged Anterior Bilaterally] : anterior cervical, supraclavicular [Axillary Lymph Nodes Enlarged Bilaterally] : axillary [No CVA Tenderness] : no CVA  tenderness [No Spinal Tenderness] : no spinal tenderness [Motor Tone] : muscle strength and tone were normal [Normal Color / Pigmentation] : normal skin color and pigmentation [Normal Affect] : the affect was normal [Normal Mood] : the mood was normal [Normal Gait] : abnormal gait [Normal Insight/Judgment] : insight and judgment were not intact [FreeTextEntry1] : right eye patch in place [de-identified] : well appearing; answers questions appropriately

## 2024-03-29 NOTE — HISTORY OF PRESENT ILLNESS
[No falls in past year] : Patient reported no falls in the past year [Completely Dependent] : Completely dependent. [0] : 2) Feeling down, depressed, or hopeless: Not at all (0) [With Patient/Caregiver] : , with patient/caregiver [I will adhere to the patient's wishes.] : I will adhere to the patient's wishes. [FreeTextEntry1] : JANET DALLAS is a 89 year old man with a PMH of advanced dementia, HTN, insomnia, and gait instability who presents for follow up visit. Patient is accompanied by daughter, Renetta, who provided collateral history.  Since last visit, patient continues to have intermittent diarrhea managed with Imodium as needed. He has not tried diet changes. He then gets constipated after taking Imodium 2-3 a day. He became dehydrated and family was giving him Pedialyte which he enjoys. He also has more diarrhea specially at night. He takes usually medications crushed in activia yogurt.   He has started to work with PT at home once a week and he really enjoys it.   He continues to attend day care programs x 3 a week. They have hired a HHA at night from 10PM to 6AM. They have noticed pt has become more debilitated.   Insomnia: -continues -daughter states that patient sleeps throughout the night 1-2 times per week but more often than not, will not fall asleep until 4am and will then sleep until 11am  -currently taking mirtazapine 15mg, gabapentin 600mg at bedtime and relaxium x 1 tablet and Seroquel 12.5mg at night  -patient also has HHAs nightly from 10pm-6am  Leg weakness: -trying to go for walks with walker; daughter states that patient requires a lot of encouragement to engage in activity -patient went swimming recently at daughter's pool   Dysphagia: -occasionally coughing with fluids -unclear pneumonia in April was s/t aspiration -evaluated at bedside by SLP while in hospital but did not have a formal swallow evaluation  Dementia: -sundowning behaviors stable  - he is also taking Sertraline 25mg daily   [[id number]] : 0 [PHQ-2 Negative - No further assessment needed] : PHQ-2 Negative - No further assessment needed [AdvancecareDate] : 05/22 [FreeTextEntry4] : daughter Renetta states that she has HCP form- she will obtain copy and bring to next visit\par  -no MOLST form completed; briefly discussed patient's wishes given recent hospitalization; Renetta states "I think he would wanted all aggressive measures as long as it were reversible but I am not sure and I need to check his living will"- plan to address at follow up visit

## 2024-03-29 NOTE — ASSESSMENT
[FreeTextEntry1] : - ordered repeat blood work  - medication refill sent - f/u in 6 months or earlier if needed

## 2024-04-01 ENCOUNTER — LABORATORY RESULT (OUTPATIENT)
Age: 89
End: 2024-04-01

## 2024-04-02 ENCOUNTER — LABORATORY RESULT (OUTPATIENT)
Age: 89
End: 2024-04-02

## 2024-04-02 LAB — A1CG - A1C WITH ESTIMATED AVERAGE GLUCOSE: NORMAL

## 2024-04-06 ENCOUNTER — INPATIENT (INPATIENT)
Facility: HOSPITAL | Age: 89
LOS: 4 days | Discharge: HOME | DRG: 439 | End: 2024-04-11
Attending: HOSPITALIST | Admitting: INTERNAL MEDICINE
Payer: MEDICARE

## 2024-04-06 VITALS
WEIGHT: 184.97 LBS | SYSTOLIC BLOOD PRESSURE: 153 MMHG | RESPIRATION RATE: 18 BRPM | OXYGEN SATURATION: 100 % | HEIGHT: 70 IN | DIASTOLIC BLOOD PRESSURE: 63 MMHG | TEMPERATURE: 98 F | HEART RATE: 81 BPM

## 2024-04-06 DIAGNOSIS — Z90.89 ACQUIRED ABSENCE OF OTHER ORGANS: Chronic | ICD-10-CM

## 2024-04-06 DIAGNOSIS — Z96.651 PRESENCE OF RIGHT ARTIFICIAL KNEE JOINT: Chronic | ICD-10-CM

## 2024-04-06 PROCEDURE — 99285 EMERGENCY DEPT VISIT HI MDM: CPT

## 2024-04-06 NOTE — ED ADULT TRIAGE NOTE - CHIEF COMPLAINT QUOTE
Pt brought in by ambulance from home for diarrhea and nausea. EMS reports that patients symptoms started approx 1 hour prior to arrival. Pt with hx dementia.   Son [phone]  Wife [phone]

## 2024-04-07 DIAGNOSIS — R19.7 DIARRHEA, UNSPECIFIED: ICD-10-CM

## 2024-04-07 LAB
A1C WITH ESTIMATED AVERAGE GLUCOSE RESULT: 5.9 % — HIGH (ref 4–5.6)
ALBUMIN SERPL ELPH-MCNC: 2.9 G/DL — LOW (ref 3.3–5)
ALBUMIN SERPL ELPH-MCNC: 3.1 G/DL — LOW (ref 3.3–5)
ALP SERPL-CCNC: 79 U/L — SIGNIFICANT CHANGE UP (ref 40–120)
ALP SERPL-CCNC: 93 U/L — SIGNIFICANT CHANGE UP (ref 40–120)
ALT FLD-CCNC: 15 U/L — SIGNIFICANT CHANGE UP (ref 12–78)
ALT FLD-CCNC: 16 U/L — SIGNIFICANT CHANGE UP (ref 12–78)
AMYLASE P1 CFR SERPL: 35 U/L — SIGNIFICANT CHANGE UP (ref 25–115)
ANION GAP SERPL CALC-SCNC: 10 MMOL/L — SIGNIFICANT CHANGE UP (ref 5–17)
ANION GAP SERPL CALC-SCNC: 4 MMOL/L — LOW (ref 5–17)
APPEARANCE UR: CLEAR — SIGNIFICANT CHANGE UP
APTT BLD: 31.2 SEC — SIGNIFICANT CHANGE UP (ref 24.5–35.6)
AST SERPL-CCNC: 27 U/L — SIGNIFICANT CHANGE UP (ref 15–37)
AST SERPL-CCNC: 29 U/L — SIGNIFICANT CHANGE UP (ref 15–37)
BACTERIA # UR AUTO: NEGATIVE /HPF — SIGNIFICANT CHANGE UP
BASOPHILS # BLD AUTO: 0.02 K/UL — SIGNIFICANT CHANGE UP (ref 0–0.2)
BASOPHILS NFR BLD AUTO: 0.2 % — SIGNIFICANT CHANGE UP (ref 0–2)
BILIRUB SERPL-MCNC: 0.7 MG/DL — SIGNIFICANT CHANGE UP (ref 0.2–1.2)
BILIRUB SERPL-MCNC: 1.1 MG/DL — SIGNIFICANT CHANGE UP (ref 0.2–1.2)
BILIRUB UR-MCNC: NEGATIVE — SIGNIFICANT CHANGE UP
BUN SERPL-MCNC: 20 MG/DL — SIGNIFICANT CHANGE UP (ref 7–23)
BUN SERPL-MCNC: 22 MG/DL — SIGNIFICANT CHANGE UP (ref 7–23)
C DIFF BY PCR RESULT: SIGNIFICANT CHANGE UP
CALCIUM SERPL-MCNC: 7.9 MG/DL — LOW (ref 8.5–10.1)
CALCIUM SERPL-MCNC: 8.7 MG/DL — SIGNIFICANT CHANGE UP (ref 8.5–10.1)
CAST: 9 /LPF — HIGH (ref 0–4)
CHLORIDE SERPL-SCNC: 117 MMOL/L — HIGH (ref 96–108)
CHLORIDE SERPL-SCNC: 118 MMOL/L — HIGH (ref 96–108)
CO2 SERPL-SCNC: 22 MMOL/L — SIGNIFICANT CHANGE UP (ref 22–31)
CO2 SERPL-SCNC: 24 MMOL/L — SIGNIFICANT CHANGE UP (ref 22–31)
COLOR SPEC: YELLOW — SIGNIFICANT CHANGE UP
COMMENT - URINE: SIGNIFICANT CHANGE UP
CREAT SERPL-MCNC: 1.06 MG/DL — SIGNIFICANT CHANGE UP (ref 0.5–1.3)
CREAT SERPL-MCNC: 1.12 MG/DL — SIGNIFICANT CHANGE UP (ref 0.5–1.3)
DIFF PNL FLD: ABNORMAL
EGFR: 62 ML/MIN/1.73M2 — SIGNIFICANT CHANGE UP
EGFR: 66 ML/MIN/1.73M2 — SIGNIFICANT CHANGE UP
EOSINOPHIL # BLD AUTO: 0.48 K/UL — SIGNIFICANT CHANGE UP (ref 0–0.5)
EOSINOPHIL NFR BLD AUTO: 5 % — SIGNIFICANT CHANGE UP (ref 0–6)
ESTIMATED AVERAGE GLUCOSE: 123 MG/DL — HIGH (ref 68–114)
FERRITIN SERPL-MCNC: 57 NG/ML — SIGNIFICANT CHANGE UP (ref 30–400)
FOLATE SERPL-MCNC: 13.7 NG/ML — SIGNIFICANT CHANGE UP
GI PCR PANEL: SIGNIFICANT CHANGE UP
GLUCOSE SERPL-MCNC: 108 MG/DL — HIGH (ref 70–99)
GLUCOSE SERPL-MCNC: 133 MG/DL — HIGH (ref 70–99)
GLUCOSE UR QL: NEGATIVE MG/DL — SIGNIFICANT CHANGE UP
HCT VFR BLD CALC: 33.8 % — LOW (ref 39–50)
HCT VFR BLD CALC: 35.3 % — LOW (ref 39–50)
HGB BLD-MCNC: 11 G/DL — LOW (ref 13–17)
HGB BLD-MCNC: 11.3 G/DL — LOW (ref 13–17)
HYALINE CASTS # UR AUTO: PRESENT
IMM GRANULOCYTES NFR BLD AUTO: 0.3 % — SIGNIFICANT CHANGE UP (ref 0–0.9)
INR BLD: 1.11 RATIO — SIGNIFICANT CHANGE UP (ref 0.85–1.18)
IRON SATN MFR SERPL: 11 % — LOW (ref 16–55)
IRON SATN MFR SERPL: 26 UG/DL — LOW (ref 45–165)
KETONES UR-MCNC: NEGATIVE MG/DL — SIGNIFICANT CHANGE UP
LEUKOCYTE ESTERASE UR-ACNC: NEGATIVE — SIGNIFICANT CHANGE UP
LIDOCAIN IGE QN: 13 U/L — SIGNIFICANT CHANGE UP (ref 13–75)
LIDOCAIN IGE QN: 27 U/L — SIGNIFICANT CHANGE UP (ref 13–75)
LYMPHOCYTES # BLD AUTO: 1.09 K/UL — SIGNIFICANT CHANGE UP (ref 1–3.3)
LYMPHOCYTES # BLD AUTO: 11.3 % — LOW (ref 13–44)
MAGNESIUM SERPL-MCNC: 1.8 MG/DL — SIGNIFICANT CHANGE UP (ref 1.6–2.6)
MCHC RBC-ENTMCNC: 32 GM/DL — SIGNIFICANT CHANGE UP (ref 32–36)
MCHC RBC-ENTMCNC: 32.4 PG — SIGNIFICANT CHANGE UP (ref 27–34)
MCHC RBC-ENTMCNC: 32.5 GM/DL — SIGNIFICANT CHANGE UP (ref 32–36)
MCHC RBC-ENTMCNC: 32.6 PG — SIGNIFICANT CHANGE UP (ref 27–34)
MCV RBC AUTO: 100.3 FL — HIGH (ref 80–100)
MCV RBC AUTO: 101.1 FL — HIGH (ref 80–100)
MONOCYTES # BLD AUTO: 1 K/UL — HIGH (ref 0–0.9)
MONOCYTES NFR BLD AUTO: 10.3 % — SIGNIFICANT CHANGE UP (ref 2–14)
NEUTROPHILS # BLD AUTO: 7.06 K/UL — SIGNIFICANT CHANGE UP (ref 1.8–7.4)
NEUTROPHILS NFR BLD AUTO: 72.9 % — SIGNIFICANT CHANGE UP (ref 43–77)
NITRITE UR-MCNC: NEGATIVE — SIGNIFICANT CHANGE UP
PH UR: 5.5 — SIGNIFICANT CHANGE UP (ref 5–8)
PHOSPHATE SERPL-MCNC: 1.8 MG/DL — LOW (ref 2.5–4.5)
PLATELET # BLD AUTO: 152 K/UL — SIGNIFICANT CHANGE UP (ref 150–400)
PLATELET # BLD AUTO: 170 K/UL — SIGNIFICANT CHANGE UP (ref 150–400)
POTASSIUM SERPL-MCNC: 3.7 MMOL/L — SIGNIFICANT CHANGE UP (ref 3.5–5.3)
POTASSIUM SERPL-MCNC: 3.8 MMOL/L — SIGNIFICANT CHANGE UP (ref 3.5–5.3)
POTASSIUM SERPL-SCNC: 3.7 MMOL/L — SIGNIFICANT CHANGE UP (ref 3.5–5.3)
POTASSIUM SERPL-SCNC: 3.8 MMOL/L — SIGNIFICANT CHANGE UP (ref 3.5–5.3)
PROT SERPL-MCNC: 6.3 GM/DL — SIGNIFICANT CHANGE UP (ref 6–8.3)
PROT SERPL-MCNC: 6.9 GM/DL — SIGNIFICANT CHANGE UP (ref 6–8.3)
PROT UR-MCNC: SIGNIFICANT CHANGE UP MG/DL
PROTHROM AB SERPL-ACNC: 12.5 SEC — SIGNIFICANT CHANGE UP (ref 9.5–13)
RBC # BLD: 3.37 M/UL — LOW (ref 4.2–5.8)
RBC # BLD: 3.49 M/UL — LOW (ref 4.2–5.8)
RBC # FLD: 13 % — SIGNIFICANT CHANGE UP (ref 10.3–14.5)
RBC # FLD: 13.1 % — SIGNIFICANT CHANGE UP (ref 10.3–14.5)
RBC CASTS # UR COMP ASSIST: 4 /HPF — SIGNIFICANT CHANGE UP (ref 0–4)
SODIUM SERPL-SCNC: 146 MMOL/L — HIGH (ref 135–145)
SODIUM SERPL-SCNC: 149 MMOL/L — HIGH (ref 135–145)
SP GR SPEC: 1.02 — SIGNIFICANT CHANGE UP (ref 1–1.03)
SQUAMOUS # UR AUTO: 18 /HPF — HIGH (ref 0–5)
T4 AB SER-ACNC: 3.8 UG/DL — LOW (ref 4.6–12)
TIBC SERPL-MCNC: 224 UG/DL — SIGNIFICANT CHANGE UP (ref 220–430)
TSH SERPL-MCNC: 2.11 UU/ML — SIGNIFICANT CHANGE UP (ref 0.34–4.82)
UIBC SERPL-MCNC: 198 UG/DL — SIGNIFICANT CHANGE UP (ref 110–370)
UROBILINOGEN FLD QL: 0.2 MG/DL — SIGNIFICANT CHANGE UP (ref 0.2–1)
VIT B12 SERPL-MCNC: 381 PG/ML — SIGNIFICANT CHANGE UP (ref 232–1245)
WBC # BLD: 8.78 K/UL — SIGNIFICANT CHANGE UP (ref 3.8–10.5)
WBC # BLD: 9.68 K/UL — SIGNIFICANT CHANGE UP (ref 3.8–10.5)
WBC # FLD AUTO: 8.78 K/UL — SIGNIFICANT CHANGE UP (ref 3.8–10.5)
WBC # FLD AUTO: 9.68 K/UL — SIGNIFICANT CHANGE UP (ref 3.8–10.5)
WBC UR QL: 34 /HPF — HIGH (ref 0–5)

## 2024-04-07 PROCEDURE — 36415 COLL VENOUS BLD VENIPUNCTURE: CPT

## 2024-04-07 PROCEDURE — 83540 ASSAY OF IRON: CPT

## 2024-04-07 PROCEDURE — 86301 IMMUNOASSAY TUMOR CA 19-9: CPT

## 2024-04-07 PROCEDURE — 82150 ASSAY OF AMYLASE: CPT

## 2024-04-07 PROCEDURE — 74181 MRI ABDOMEN W/O CONTRAST: CPT | Mod: MC

## 2024-04-07 PROCEDURE — 85610 PROTHROMBIN TIME: CPT

## 2024-04-07 PROCEDURE — 82607 VITAMIN B-12: CPT

## 2024-04-07 PROCEDURE — 80053 COMPREHEN METABOLIC PANEL: CPT

## 2024-04-07 PROCEDURE — 74177 CT ABD & PELVIS W/CONTRAST: CPT | Mod: 26,MC

## 2024-04-07 PROCEDURE — 74181 MRI ABDOMEN W/O CONTRAST: CPT | Mod: 26

## 2024-04-07 PROCEDURE — 85025 COMPLETE CBC W/AUTO DIFF WBC: CPT

## 2024-04-07 PROCEDURE — 83036 HEMOGLOBIN GLYCOSYLATED A1C: CPT

## 2024-04-07 PROCEDURE — 99497 ADVNCD CARE PLAN 30 MIN: CPT | Mod: 25

## 2024-04-07 PROCEDURE — 83735 ASSAY OF MAGNESIUM: CPT

## 2024-04-07 PROCEDURE — 85049 AUTOMATED PLATELET COUNT: CPT

## 2024-04-07 PROCEDURE — 85027 COMPLETE CBC AUTOMATED: CPT

## 2024-04-07 PROCEDURE — 87040 BLOOD CULTURE FOR BACTERIA: CPT

## 2024-04-07 PROCEDURE — 85014 HEMATOCRIT: CPT

## 2024-04-07 PROCEDURE — 84443 ASSAY THYROID STIM HORMONE: CPT

## 2024-04-07 PROCEDURE — 85018 HEMOGLOBIN: CPT

## 2024-04-07 PROCEDURE — 99223 1ST HOSP IP/OBS HIGH 75: CPT | Mod: 25,AI

## 2024-04-07 PROCEDURE — 82378 CARCINOEMBRYONIC ANTIGEN: CPT

## 2024-04-07 PROCEDURE — 80048 BASIC METABOLIC PNL TOTAL CA: CPT

## 2024-04-07 PROCEDURE — 84436 ASSAY OF TOTAL THYROXINE: CPT

## 2024-04-07 PROCEDURE — 97163 PT EVAL HIGH COMPLEX 45 MIN: CPT | Mod: GP

## 2024-04-07 PROCEDURE — 83690 ASSAY OF LIPASE: CPT

## 2024-04-07 PROCEDURE — 83550 IRON BINDING TEST: CPT

## 2024-04-07 PROCEDURE — 84100 ASSAY OF PHOSPHORUS: CPT

## 2024-04-07 PROCEDURE — 82746 ASSAY OF FOLIC ACID SERUM: CPT

## 2024-04-07 PROCEDURE — 85730 THROMBOPLASTIN TIME PARTIAL: CPT

## 2024-04-07 PROCEDURE — 82728 ASSAY OF FERRITIN: CPT

## 2024-04-07 RX ORDER — LOSARTAN POTASSIUM 100 MG/1
50 TABLET, FILM COATED ORAL DAILY
Refills: 0 | Status: DISCONTINUED | OUTPATIENT
Start: 2024-04-07 | End: 2024-04-11

## 2024-04-07 RX ORDER — LOSARTAN POTASSIUM 100 MG/1
1 TABLET, FILM COATED ORAL
Refills: 0 | DISCHARGE

## 2024-04-07 RX ORDER — IRON SUCROSE 20 MG/ML
200 INJECTION, SOLUTION INTRAVENOUS EVERY 24 HOURS
Refills: 0 | Status: COMPLETED | OUTPATIENT
Start: 2024-04-07 | End: 2024-04-10

## 2024-04-07 RX ORDER — QUETIAPINE FUMARATE 200 MG/1
25 TABLET, FILM COATED ORAL
Refills: 0 | Status: DISCONTINUED | OUTPATIENT
Start: 2024-04-07 | End: 2024-04-11

## 2024-04-07 RX ORDER — LIPASE/PROTEASE/AMYLASE 16-48-48K
2 CAPSULE,DELAYED RELEASE (ENTERIC COATED) ORAL
Refills: 0 | Status: DISCONTINUED | OUTPATIENT
Start: 2024-04-07 | End: 2024-04-07

## 2024-04-07 RX ORDER — ACETAMINOPHEN 500 MG
650 TABLET ORAL EVERY 6 HOURS
Refills: 0 | Status: DISCONTINUED | OUTPATIENT
Start: 2024-04-07 | End: 2024-04-11

## 2024-04-07 RX ORDER — SODIUM CHLORIDE 9 MG/ML
1000 INJECTION INTRAMUSCULAR; INTRAVENOUS; SUBCUTANEOUS ONCE
Refills: 0 | Status: COMPLETED | OUTPATIENT
Start: 2024-04-07 | End: 2024-04-07

## 2024-04-07 RX ORDER — MIRTAZAPINE 45 MG/1
15 TABLET, ORALLY DISINTEGRATING ORAL AT BEDTIME
Refills: 0 | Status: DISCONTINUED | OUTPATIENT
Start: 2024-04-07 | End: 2024-04-11

## 2024-04-07 RX ORDER — SERTRALINE 25 MG/1
25 TABLET, FILM COATED ORAL DAILY
Refills: 0 | Status: DISCONTINUED | OUTPATIENT
Start: 2024-04-07 | End: 2024-04-11

## 2024-04-07 RX ORDER — GABAPENTIN 400 MG/1
600 CAPSULE ORAL AT BEDTIME
Refills: 0 | Status: DISCONTINUED | OUTPATIENT
Start: 2024-04-07 | End: 2024-04-11

## 2024-04-07 RX ORDER — GABAPENTIN 400 MG/1
2 CAPSULE ORAL
Qty: 0 | Refills: 0 | DISCHARGE

## 2024-04-07 RX ORDER — GABAPENTIN 400 MG/1
1 CAPSULE ORAL
Refills: 0 | DISCHARGE

## 2024-04-07 RX ORDER — LANOLIN ALCOHOL/MO/W.PET/CERES
3 CREAM (GRAM) TOPICAL AT BEDTIME
Refills: 0 | Status: DISCONTINUED | OUTPATIENT
Start: 2024-04-07 | End: 2024-04-11

## 2024-04-07 RX ORDER — POTASSIUM PHOSPHATE, MONOBASIC POTASSIUM PHOSPHATE, DIBASIC 236; 224 MG/ML; MG/ML
15 INJECTION, SOLUTION INTRAVENOUS ONCE
Refills: 0 | Status: COMPLETED | OUTPATIENT
Start: 2024-04-07 | End: 2024-04-07

## 2024-04-07 RX ORDER — PREGABALIN 225 MG/1
1000 CAPSULE ORAL ONCE
Refills: 0 | Status: COMPLETED | OUTPATIENT
Start: 2024-04-07 | End: 2024-04-07

## 2024-04-07 RX ORDER — PREGABALIN 225 MG/1
1000 CAPSULE ORAL DAILY
Refills: 0 | Status: DISCONTINUED | OUTPATIENT
Start: 2024-04-08 | End: 2024-04-11

## 2024-04-07 RX ORDER — HEPARIN SODIUM 5000 [USP'U]/ML
5000 INJECTION INTRAVENOUS; SUBCUTANEOUS EVERY 8 HOURS
Refills: 0 | Status: DISCONTINUED | OUTPATIENT
Start: 2024-04-07 | End: 2024-04-11

## 2024-04-07 RX ORDER — PIPERACILLIN AND TAZOBACTAM 4; .5 G/20ML; G/20ML
3.38 INJECTION, POWDER, LYOPHILIZED, FOR SOLUTION INTRAVENOUS EVERY 8 HOURS
Refills: 0 | Status: DISCONTINUED | OUTPATIENT
Start: 2024-04-07 | End: 2024-04-09

## 2024-04-07 RX ORDER — MIRTAZAPINE 45 MG/1
1 TABLET, ORALLY DISINTEGRATING ORAL
Qty: 0 | Refills: 0 | DISCHARGE

## 2024-04-07 RX ORDER — SERTRALINE 25 MG/1
1 TABLET, FILM COATED ORAL
Qty: 0 | Refills: 0 | DISCHARGE

## 2024-04-07 RX ORDER — ONDANSETRON 8 MG/1
4 TABLET, FILM COATED ORAL EVERY 8 HOURS
Refills: 0 | Status: DISCONTINUED | OUTPATIENT
Start: 2024-04-07 | End: 2024-04-11

## 2024-04-07 RX ORDER — SODIUM,POTASSIUM PHOSPHATES 278-250MG
1 POWDER IN PACKET (EA) ORAL
Refills: 0 | Status: DISCONTINUED | OUTPATIENT
Start: 2024-04-07 | End: 2024-04-07

## 2024-04-07 RX ORDER — SODIUM CHLORIDE 9 MG/ML
1000 INJECTION, SOLUTION INTRAVENOUS
Refills: 0 | Status: DISCONTINUED | OUTPATIENT
Start: 2024-04-07 | End: 2024-04-09

## 2024-04-07 RX ORDER — LIPASE/PROTEASE/AMYLASE 16-48-48K
4 CAPSULE,DELAYED RELEASE (ENTERIC COATED) ORAL
Refills: 0 | Status: DISCONTINUED | OUTPATIENT
Start: 2024-04-07 | End: 2024-04-07

## 2024-04-07 RX ORDER — LIPASE/PROTEASE/AMYLASE 16-48-48K
4 CAPSULE,DELAYED RELEASE (ENTERIC COATED) ORAL
Refills: 0 | Status: DISCONTINUED | OUTPATIENT
Start: 2024-04-07 | End: 2024-04-11

## 2024-04-07 RX ORDER — PIPERACILLIN AND TAZOBACTAM 4; .5 G/20ML; G/20ML
3.38 INJECTION, POWDER, LYOPHILIZED, FOR SOLUTION INTRAVENOUS ONCE
Refills: 0 | Status: COMPLETED | OUTPATIENT
Start: 2024-04-07 | End: 2024-04-07

## 2024-04-07 RX ADMIN — GABAPENTIN 600 MILLIGRAM(S): 400 CAPSULE ORAL at 22:58

## 2024-04-07 RX ADMIN — LOSARTAN POTASSIUM 50 MILLIGRAM(S): 100 TABLET, FILM COATED ORAL at 10:17

## 2024-04-07 RX ADMIN — PREGABALIN 1000 MICROGRAM(S): 225 CAPSULE ORAL at 17:05

## 2024-04-07 RX ADMIN — Medication 3 MILLIGRAM(S): at 22:58

## 2024-04-07 RX ADMIN — HEPARIN SODIUM 5000 UNIT(S): 5000 INJECTION INTRAVENOUS; SUBCUTANEOUS at 14:36

## 2024-04-07 RX ADMIN — PIPERACILLIN AND TAZOBACTAM 200 GRAM(S): 4; .5 INJECTION, POWDER, LYOPHILIZED, FOR SOLUTION INTRAVENOUS at 05:16

## 2024-04-07 RX ADMIN — Medication 1 MILLIGRAM(S): at 13:15

## 2024-04-07 RX ADMIN — Medication 1 TABLET(S): at 10:17

## 2024-04-07 RX ADMIN — POTASSIUM PHOSPHATE, MONOBASIC POTASSIUM PHOSPHATE, DIBASIC 62.5 MILLIMOLE(S): 236; 224 INJECTION, SOLUTION INTRAVENOUS at 18:34

## 2024-04-07 RX ADMIN — SODIUM CHLORIDE 1000 MILLILITER(S): 9 INJECTION INTRAMUSCULAR; INTRAVENOUS; SUBCUTANEOUS at 03:00

## 2024-04-07 RX ADMIN — PIPERACILLIN AND TAZOBACTAM 25 GRAM(S): 4; .5 INJECTION, POWDER, LYOPHILIZED, FOR SOLUTION INTRAVENOUS at 14:36

## 2024-04-07 RX ADMIN — SODIUM CHLORIDE 50 MILLILITER(S): 9 INJECTION, SOLUTION INTRAVENOUS at 10:58

## 2024-04-07 RX ADMIN — PIPERACILLIN AND TAZOBACTAM 25 GRAM(S): 4; .5 INJECTION, POWDER, LYOPHILIZED, FOR SOLUTION INTRAVENOUS at 22:58

## 2024-04-07 RX ADMIN — Medication 1 MILLIGRAM(S): at 11:46

## 2024-04-07 RX ADMIN — SERTRALINE 25 MILLIGRAM(S): 25 TABLET, FILM COATED ORAL at 10:17

## 2024-04-07 RX ADMIN — HEPARIN SODIUM 5000 UNIT(S): 5000 INJECTION INTRAVENOUS; SUBCUTANEOUS at 22:55

## 2024-04-07 RX ADMIN — MIRTAZAPINE 15 MILLIGRAM(S): 45 TABLET, ORALLY DISINTEGRATING ORAL at 22:57

## 2024-04-07 NOTE — PATIENT PROFILE ADULT - FUNCTIONAL ASSESSMENT - DAILY ACTIVITY 5.
FAMILY HISTORY:  Father  Still living? No  Family history of hypertension, Age at diagnosis: Age Unknown  Family history of MI (myocardial infarction), Age at diagnosis: 61-70    Mother  Still living? Yes, Estimated age: 61-70  Family history of hypertension, Age at diagnosis: 61-70  Family history of hypertension, Age at diagnosis: Age Unknown    Sibling  Still living? Unknown  FH: diabetes mellitus, Age at diagnosis: Age Unknown     3 = A little assistance

## 2024-04-07 NOTE — H&P ADULT - CONVERSATION DETAILS
Goals of Care (GOC)/Advance Care Planning (ACP)  Date of Discussion/evaluation: 4/7/2024  Purpose of Discussion: In the setting of advanced age and multiple comorbidities, discussion of patient's wishes should there be any deterioration in health status.   Parties Present/Discussed with: Myself and son Guero  Patient's Decision making capacity at the time of discussion: AAOx1 and does NOT have medical decision making capacity  Presentation: As above  GOC: Code Status, Alternative Feeding Methods,     PLAN:  Code Status: DNR/DNI with trial of NIV  Alternative Feeding methods: Would like to discuss or assess should the situation arise that it requires alternative feeding methods  Pressors: YES agreeable  IV Fluid: YES agreeable  Antibiotics: YES agreeable  HD: Determine use should situation arise.     ACP/GOC Time Spent: 17 minutes    MOLST completed. Of note patient's son Guero has incomplete MOLST at home and went through form with  him over the phone and all questions answered.

## 2024-04-07 NOTE — ED PROVIDER NOTE - PHYSICAL EXAMINATION
***GEN - NAD; A+O x1 ***HEAD - NC/AT ***EYES/NOSE - PERRL, EOMI, mucous membranes moist, no discharge ***THROAT: Oral cavity and pharynx normal. No inflammation, swelling, exudate, or lesions.  ***NECK: Neck supple, non-tender without lymphadenopathy, no masses, no thyromegaly.   ***PULMONARY - CTA b/l, symmetric breath sounds. ***CARDIAC -s1s2, RRR, no M,G,R  ***ABDOMEN - +BS, ND, NT, soft, no guarding, +diarrhea on exam  ***BACK - no CVA tenderness, Normal  spine ***EXTREMITIES - symmetric pulses, 2+ dp, capillary refill < 2 seconds ***SKIN - no rash or bruising   ***NEUROLOGIC - alert, CN 2-12 intact

## 2024-04-07 NOTE — H&P ADULT - HISTORY OF PRESENT ILLNESS
89M with PMH of HTN, Dementia, Depression, BPH presents with diarrhea.    In the ER, Tmax 98.9, HR 91-88, /63, RR 18, SpO2 100% on RA. Hgb 11.3 (.1), Na 149, BUN 22, Cr 1.12 (Baseline 0.9-1.1), LFTs unremarkable, Lipase WNL, UA non-diagnostic. CT abd/pel with Con showing large 3.9cm Fundal Gall Stone, mild GB wall thickening vs pericholecystic fluid and dilated pancreatic duct up to 12mm with possible pancreatic atrophy.     Limited Surgical, Family, Social history due to dementia. Information documented from prior medical records.  89M with PMH of HTN, Dementia, Depression, BPH presents with diarrhea. Patient AAOx1 at baseline on admission. Information obtained from report and discussion for patient's son Guero. Reported approximately 3-4 episodes of diarrhea in the last 1-2 weeks without blood. Some muscous reported. No reported dizziness, chest pain, SOB, nausea, vomiting, abdominal pain/discomfort.     In the ER, Tmax 98.9, HR 91-88, /63, RR 18, SpO2 100% on RA. Hgb 11.3 (.1), Na 149, BUN 22, Cr 1.12 (Baseline 0.9-1.1), LFTs unremarkable, Lipase WNL, UA non-diagnostic. CT abd/pel with Con showing large 3.9cm Fundal Gall Stone, mild GB wall thickening vs pericholecystic fluid and dilated pancreatic duct up to 12mm with possible pancreatic atrophy.     Limited Surgical, Family, Social history due to dementia. Information documented from prior medical records.

## 2024-04-07 NOTE — ED PROVIDER NOTE - OBJECTIVE STATEMENT
89-year-old male with history of dementia sent from home with multiple episodes of diarrhea over the past 2 weeks.  Spoke with patient's son at home who states that he has been having loose stools but over the past 1 week he has been having many episodes of diarrhea.  He states this happens sometimes and he is generally able to help him control the diarrhea however this past week he has been unable to.  No fevers.  Patient unable to contribute to HPI secondary to dementia.

## 2024-04-07 NOTE — ED PROVIDER NOTE - IV ALTEPLASE EXCL REL HIDDEN
The patient denies any CP, SOB, cough, F nvd, malodorous urine. 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 No evidence of pulmonary embolism. MR H: No acute infarct, intracranial hemorrhage, or mass effect.Slight increase in number of scattered infratentorial/supratentorial punctate chronic microhemorrhages since brain MRI of 5/19/16, likely sequela of hypertension. show

## 2024-04-07 NOTE — PATIENT PROFILE ADULT - FALL HARM RISK - HARM RISK INTERVENTIONS
Assistance with ambulation/Assistance OOB with selected safe patient handling equipment/Communicate Risk of Fall with Harm to all staff/Discuss with provider need for PT consult/Monitor for mental status changes/Monitor gait and stability/Move patient closer to nurses' station/Provide patient with walking aids - walker, cane, crutches/Reinforce activity limits and safety measures with patient and family/Reorient to person, place and time as needed/Tailored Fall Risk Interventions/Toileting schedule using arm’s reach rule for commode and bathroom/Use of alarms - bed, chair and/or voice tab/Visual Cue: Yellow wristband and red socks/Bed in lowest position, wheels locked, appropriate side rails in place/Call bell, personal items and telephone in reach/Instruct patient to call for assistance before getting out of bed or chair/Non-slip footwear when patient is out of bed/[place] to call system/Physically safe environment - no spills, clutter or unnecessary equipment/Purposeful Proactive Rounding/Room/bathroom lighting operational, light cord in reach

## 2024-04-07 NOTE — PHARMACOTHERAPY INTERVENTION NOTE - COMMENTS
Medication history complete, reviewed medications wit patient provided med list and confirmed with doctor first med hx.

## 2024-04-07 NOTE — H&P ADULT - ASSESSMENT
A/P    Cholelithiasis  Dilated Pancreatic Duct with possible pancreatic Atrophy  Diarrhea  Associated Hypernatremia  -Med/surg  -IVF as needed for fluid balance  -Continue to monitor Cr/Electrolytes and correct electrolytes accordingly  -Surgery consult/recs  -MRCP  -NPO for now  -Empiric Antibiotics. No Sepsis POA.   -Pain control/Bowel regimen    HTN  -Home losartan. Titrate Rx accordingly.       Macrocytic Anemia  -No overt signs of bleeding  -Continue to monitor  -Ferritin/Iron/B12/Folate levels    Dementia  Depression  -Continue Remeron/Sertraline  -Continue seroquel PRN    DVT Prophylaxis: heparin subq A/P    Cholelithiasis  Dilated Pancreatic Duct with possible pancreatic Atrophy. Pancreatic Insufficiency?  Diarrhea  Associated Hypernatremia  -Med/surg  -IVF as needed for fluid balance  -Continue to monitor Cr/Electrolytes and correct electrolytes accordingly  -Surgery consult/recs  -MRCP  -NPO for now->regular diet after MRCP  -Empiric Antibiotics. No Sepsis POA.   -Pain control/Bowel regimen    HTN  -Home losartan. Titrate Rx accordingly.       Macrocytic Anemia  -No overt signs of bleeding  -Continue to monitor  -Ferritin/Iron/B12/Folate levels    Dementia  Depression  -Continue Remeron/Sertraline  -Continue seroquel PRN    DVT Prophylaxis: heparin subq

## 2024-04-07 NOTE — PATIENT PROFILE ADULT - FALL HARM RISK - TYPE OF ASSESSMENT
"Patient is here to discuss follow up.    BP (!) 157/83 (BP Location: Right arm, Patient Position: Chair, Cuff Size: Adult Large)   Pulse 86   Ht 5' 6\" (1.676 m)   Wt 194 lb 6.4 oz (88.2 kg)   SpO2 98%   BMI 31.38 kg/m      Questions patient would like addressed today are: N/A.    Refills are needed: N/A    Has homecare services and agency name:  [facility]  " Admission

## 2024-04-07 NOTE — H&P ADULT - NSHPPHYSICALEXAM_GEN_ALL_CORE
PHYSICAL EXAM:    T(C): 37.1 (04-07-24 @ 07:11), Max: 37.2 (04-07-24 @ 05:10)  HR: 81 (04-07-24 @ 07:11) (74 - 88)  BP: 139/50 (04-07-24 @ 07:11) (127/50 - 153/63)  RR: 18 (04-07-24 @ 07:11) (18 - 18)  SpO2: 98% (04-07-24 @ 07:11) (95% - 100%)    General: AAOx1-2; NAD  Head: AT/NC  ENT: Moist Mucous Membranes; No Injury  Eyes: EOMI; PERRL  Neck: Non-tender; No JVD  CVS: RRR, S1&S2, No murmur, No edema  Respiratory: Lungs CTA B/L; Normal Respiratory Effort  Abdomen/GI: Soft, non-tender, non-distended, no guarding, no rebound, normal bowel sounds  : No bladder distention, No Li  Extremities: No cyanosis, No clubbing, No edema  MSK: No CVA tenderness, Normal ROM, No injury  Neuro: AAOx1-2, CNII-XII grossly intact, non-focal  Psych: Appropriate, Cooperative,   Skin: Clean, Dry and Intact PHYSICAL EXAM:    T(C): 37.1 (04-07-24 @ 07:11), Max: 37.2 (04-07-24 @ 05:10)  HR: 81 (04-07-24 @ 07:11) (74 - 88)  BP: 139/50 (04-07-24 @ 07:11) (127/50 - 153/63)  RR: 18 (04-07-24 @ 07:11) (18 - 18)  SpO2: 98% (04-07-24 @ 07:11) (95% - 100%)    General: AAOx1; NAD  Head: AT/NC  ENT: Moist Mucous Membranes; No Injury  Eyes: EOMI; PERRL; 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   Neck: Non-tender; No JVD  CVS: RRR, S1&S2, No murmur, No edema  Respiratory: Lungs CTA B/L; Normal Respiratory Effort  Abdomen/GI: Soft, non-tender, non-distended, no guarding, no rebound, normal bowel sounds  : No bladder distention, No Li  Extremities: No cyanosis, No clubbing, No edema  MSK: No CVA tenderness, Normal ROM, No injury  Neuro: AAOx1, CNII-XII grossly intact, non-focal  Psych: Appropriate, Cooperative,   Skin: Clean, Dry and Intact

## 2024-04-07 NOTE — ED PROVIDER NOTE - CLINICAL SUMMARY MEDICAL DECISION MAKING FREE TEXT BOX
89-year-old male with diarrhea for about 2 weeks and concern for dehydration.  Will obtain lab with GI PCR, hydrate with IV fluids and obtain CT abdomen pelvis.  CT noted as well as labs.  Surgical consult placed for possible acute cholecystitis.  Spoke with evaluating surgical resident who is recommending medical admission with IR consult for percutaneous cholecystostomy tube.  Empiric dose of antibiotics ordered.  Will admit for further care.  Signout given to hospitalist Dr. Barth.

## 2024-04-07 NOTE — ED ADULT NURSE NOTE - OBJECTIVE STATEMENT
pt is A/Ox2 from home and is nonambulatory. pt to the ED with c/o of diarrhea. pt with hx of dementia and is unable to provider further hx. pt denies abdominal pain. pt respirations even and unlabored. pt in NAD.

## 2024-04-07 NOTE — ED ADULT NURSE NOTE - CHIEF COMPLAINT QUOTE
Pt brought in by ambulance from home for diarrhea and nausea. EMS reports that patients symptoms started approx 1 hour prior to arrival. Pt with hx dementia.   Son [phone]  Wife [phone]

## 2024-04-07 NOTE — H&P ADULT - NSHPLABSRESULTS_GEN_ALL_CORE
11.3   9.68  )-----------( 170      ( 06 Apr 2024 23:22 )             35.3     04-06    149<H>  |  117<H>  |  22  ----------------------------<  133<H>  3.8   |  22  |  1.12    Ca    8.7      06 Apr 2024 23:22    TPro  6.9  /  Alb  3.1<L>  /  TBili  0.7  /  DBili  x   /  AST  27  /  ALT  16  /  AlkPhos  93  04-06    SARS-CoV-2: NotDetec (11 Nov 2023 16:13)    CAPILLARY BLOOD GLUCOSE        Urinalysis (04.07.24 @ 01:24)   pH Urine: 5.5  Glucose Qualitative, Urine: Negative mg/dL  Blood, Urine: Moderate  Color: Yellow  Urine Appearance: Clear  Bilirubin: Negative  Ketone - Urine: Negative mg/dL  Specific Gravity: 1.022  Protein, Urine: Trace mg/dL  Urobilinogen: 0.2 mg/dL  Nitrite: Negative  Leukocyte Esterase Concentration: NegativeUrine Microscopic-Add On (NC) (04.07.24 @ 01:24)   Cast: 9 /LPF  Epithelial Cells: 18 /HPF  Bacteria: Negative /HPF  Comment - Urine: yeast is present  Red Blood Cell - Urine: 4 /HPF  White Blood Cell - Urine: 34 /HPF  Hyaline Casts: PresentLipase (04.06.24 @ 23:22)   Lipase: 13 U/L    RADIOLOGY:    < from: CT Abdomen and Pelvis w/ IV Cont (04.07.24 @ 02:54) >    IMPRESSION:    Large 3.9 cm fundal gallbladder stone. The gallbladder mucosa shows   prominent enhancement and there is mild gallbladder wall thickening   versus pericholecystic fluid at the fundus.Correlate clinically for   gallbladder disease.    Height loss at the superior endplate of the L2 vertebral body is age   indeterminant but new since the previous exam of 7/12/2023. Correlate   clinically for pain at this site.    Markedly dilated pancreatic duct measuring up to 12 mm with pancreatic   atrophy. Possible is not excluded. This is not well evaluated on this   examination. Recommend contrast-enhanced MRI abdomen with MRCP for   further evaluation.    < end of copied text >    I personally reviewed labs, imaging, orders and vitals.  Discussed with patient, family, RN, CM/SW

## 2024-04-07 NOTE — PATIENT PROFILE ADULT - FUNCTIONAL ASSESSMENT - BASIC MOBILITY 6.
2-calculated by average/Not able to assess (calculate score using [facility] averaging method)  2-calculated by average/Not able to assess (calculate score using [facility] averaging method)

## 2024-04-07 NOTE — ED ADULT NURSE NOTE - NS ED NURSE REPORT GIVEN DT
Pt states that the pain is unbearable, was told he has a pilonidal cyst and that he needs a consult for colorectal surgery. Was here for the same thing yesterday   07-Apr-2024 08:40

## 2024-04-08 ENCOUNTER — LABORATORY RESULT (OUTPATIENT)
Age: 89
End: 2024-04-08

## 2024-04-08 ENCOUNTER — TRANSCRIPTION ENCOUNTER (OUTPATIENT)
Age: 89
End: 2024-04-08

## 2024-04-08 DIAGNOSIS — E44.0 MODERATE PROTEIN-CALORIE MALNUTRITION: ICD-10-CM

## 2024-04-08 DIAGNOSIS — K80.20 CALCULUS OF GALLBLADDER WITHOUT CHOLECYSTITIS WITHOUT OBSTRUCTION: ICD-10-CM

## 2024-04-08 LAB
ALBUMIN SERPL ELPH-MCNC: 2.6 G/DL — LOW (ref 3.3–5)
ALP SERPL-CCNC: 62 U/L — SIGNIFICANT CHANGE UP (ref 40–120)
ALT FLD-CCNC: 14 U/L — SIGNIFICANT CHANGE UP (ref 12–78)
ANION GAP SERPL CALC-SCNC: 5 MMOL/L — SIGNIFICANT CHANGE UP (ref 5–17)
AST SERPL-CCNC: 28 U/L — SIGNIFICANT CHANGE UP (ref 15–37)
BASOPHILS # BLD AUTO: 0.03 K/UL — SIGNIFICANT CHANGE UP (ref 0–0.2)
BASOPHILS NFR BLD AUTO: 0.4 % — SIGNIFICANT CHANGE UP (ref 0–2)
BILIRUB SERPL-MCNC: 1.2 MG/DL — SIGNIFICANT CHANGE UP (ref 0.2–1.2)
BUN SERPL-MCNC: 19 MG/DL — SIGNIFICANT CHANGE UP (ref 7–23)
CALCIUM SERPL-MCNC: 8.2 MG/DL — LOW (ref 8.5–10.1)
CANCER AG19-9 SERPL-ACNC: 15 U/ML — SIGNIFICANT CHANGE UP
CEA SERPL-MCNC: 5.3 NG/ML — HIGH (ref 0–3.8)
CHLORIDE SERPL-SCNC: 117 MMOL/L — HIGH (ref 96–108)
CO2 SERPL-SCNC: 23 MMOL/L — SIGNIFICANT CHANGE UP (ref 22–31)
CREAT SERPL-MCNC: 1.05 MG/DL — SIGNIFICANT CHANGE UP (ref 0.5–1.3)
CULTURE RESULTS: NO GROWTH — SIGNIFICANT CHANGE UP
EGFR: 67 ML/MIN/1.73M2 — SIGNIFICANT CHANGE UP
EOSINOPHIL # BLD AUTO: 0.75 K/UL — HIGH (ref 0–0.5)
EOSINOPHIL NFR BLD AUTO: 10.6 % — HIGH (ref 0–6)
GLUCOSE SERPL-MCNC: 92 MG/DL — SIGNIFICANT CHANGE UP (ref 70–99)
HCT VFR BLD CALC: 30.2 % — LOW (ref 39–50)
HGB BLD-MCNC: 10.1 G/DL — LOW (ref 13–17)
IMM GRANULOCYTES NFR BLD AUTO: 0.3 % — SIGNIFICANT CHANGE UP (ref 0–0.9)
LYMPHOCYTES # BLD AUTO: 1.12 K/UL — SIGNIFICANT CHANGE UP (ref 1–3.3)
LYMPHOCYTES # BLD AUTO: 15.8 % — SIGNIFICANT CHANGE UP (ref 13–44)
MAGNESIUM SERPL-MCNC: 1.9 MG/DL — SIGNIFICANT CHANGE UP (ref 1.6–2.6)
MCHC RBC-ENTMCNC: 33.2 PG — SIGNIFICANT CHANGE UP (ref 27–34)
MCHC RBC-ENTMCNC: 33.4 GM/DL — SIGNIFICANT CHANGE UP (ref 32–36)
MCV RBC AUTO: 99.3 FL — SIGNIFICANT CHANGE UP (ref 80–100)
MONOCYTES # BLD AUTO: 0.74 K/UL — SIGNIFICANT CHANGE UP (ref 0–0.9)
MONOCYTES NFR BLD AUTO: 10.5 % — SIGNIFICANT CHANGE UP (ref 2–14)
NEUTROPHILS # BLD AUTO: 4.41 K/UL — SIGNIFICANT CHANGE UP (ref 1.8–7.4)
NEUTROPHILS NFR BLD AUTO: 62.4 % — SIGNIFICANT CHANGE UP (ref 43–77)
PHOSPHATE SERPL-MCNC: 2.5 MG/DL — SIGNIFICANT CHANGE UP (ref 2.5–4.5)
PLATELET # BLD AUTO: 144 K/UL — LOW (ref 150–400)
POTASSIUM SERPL-MCNC: 3.6 MMOL/L — SIGNIFICANT CHANGE UP (ref 3.5–5.3)
POTASSIUM SERPL-SCNC: 3.6 MMOL/L — SIGNIFICANT CHANGE UP (ref 3.5–5.3)
PROT SERPL-MCNC: 5.8 GM/DL — LOW (ref 6–8.3)
RBC # BLD: 3.04 M/UL — LOW (ref 4.2–5.8)
RBC # FLD: 12.8 % — SIGNIFICANT CHANGE UP (ref 10.3–14.5)
SODIUM SERPL-SCNC: 145 MMOL/L — SIGNIFICANT CHANGE UP (ref 135–145)
SPECIMEN SOURCE: SIGNIFICANT CHANGE UP
WBC # BLD: 7.07 K/UL — SIGNIFICANT CHANGE UP (ref 3.8–10.5)
WBC # FLD AUTO: 7.07 K/UL — SIGNIFICANT CHANGE UP (ref 3.8–10.5)

## 2024-04-08 PROCEDURE — 99221 1ST HOSP IP/OBS SF/LOW 40: CPT

## 2024-04-08 PROCEDURE — 99232 SBSQ HOSP IP/OBS MODERATE 35: CPT

## 2024-04-08 PROCEDURE — 99223 1ST HOSP IP/OBS HIGH 75: CPT

## 2024-04-08 RX ADMIN — GABAPENTIN 600 MILLIGRAM(S): 400 CAPSULE ORAL at 21:25

## 2024-04-08 RX ADMIN — QUETIAPINE FUMARATE 25 MILLIGRAM(S): 200 TABLET, FILM COATED ORAL at 21:25

## 2024-04-08 RX ADMIN — Medication 4 CAPSULE(S): at 13:50

## 2024-04-08 RX ADMIN — PIPERACILLIN AND TAZOBACTAM 25 GRAM(S): 4; .5 INJECTION, POWDER, LYOPHILIZED, FOR SOLUTION INTRAVENOUS at 13:50

## 2024-04-08 RX ADMIN — SERTRALINE 25 MILLIGRAM(S): 25 TABLET, FILM COATED ORAL at 09:07

## 2024-04-08 RX ADMIN — MIRTAZAPINE 15 MILLIGRAM(S): 45 TABLET, ORALLY DISINTEGRATING ORAL at 21:25

## 2024-04-08 RX ADMIN — HEPARIN SODIUM 5000 UNIT(S): 5000 INJECTION INTRAVENOUS; SUBCUTANEOUS at 07:05

## 2024-04-08 RX ADMIN — Medication 4 CAPSULE(S): at 09:07

## 2024-04-08 RX ADMIN — PREGABALIN 1000 MICROGRAM(S): 225 CAPSULE ORAL at 09:07

## 2024-04-08 RX ADMIN — PIPERACILLIN AND TAZOBACTAM 25 GRAM(S): 4; .5 INJECTION, POWDER, LYOPHILIZED, FOR SOLUTION INTRAVENOUS at 07:05

## 2024-04-08 RX ADMIN — Medication 4 CAPSULE(S): at 17:18

## 2024-04-08 RX ADMIN — IRON SUCROSE 110 MILLIGRAM(S): 20 INJECTION, SOLUTION INTRAVENOUS at 03:05

## 2024-04-08 RX ADMIN — Medication 1 TABLET(S): at 09:08

## 2024-04-08 RX ADMIN — Medication 3 MILLIGRAM(S): at 21:24

## 2024-04-08 RX ADMIN — HEPARIN SODIUM 5000 UNIT(S): 5000 INJECTION INTRAVENOUS; SUBCUTANEOUS at 13:50

## 2024-04-08 RX ADMIN — PIPERACILLIN AND TAZOBACTAM 25 GRAM(S): 4; .5 INJECTION, POWDER, LYOPHILIZED, FOR SOLUTION INTRAVENOUS at 21:24

## 2024-04-08 RX ADMIN — LOSARTAN POTASSIUM 50 MILLIGRAM(S): 100 TABLET, FILM COATED ORAL at 09:08

## 2024-04-08 RX ADMIN — HEPARIN SODIUM 5000 UNIT(S): 5000 INJECTION INTRAVENOUS; SUBCUTANEOUS at 21:25

## 2024-04-08 NOTE — DISCHARGE NOTE NURSING/CASE MANAGEMENT/SOCIAL WORK - PATIENT PORTAL LINK FT
You can access the FollowMyHealth Patient Portal offered by [facility] by registering at the following website: [email or web address]. By joining [facility]’s FollowMyHealth portal, you will also be able to view your health information using other applications (apps) compatible with our system.

## 2024-04-08 NOTE — DIETITIAN INITIAL EVALUATION ADULT - MALNUTRITION
Patient meets criteria for moderate protein-calorie malnutrition in context of acute on  chronic disease

## 2024-04-08 NOTE — DIETITIAN INITIAL EVALUATION ADULT - FEEDING ASSISTANCE
Tray set-up, open all containers, encourage po intake, assist as needed, pt tends to drop food off fork

## 2024-04-08 NOTE — DIETITIAN INITIAL EVALUATION ADULT - PERTINENT LABORATORY DATA
04-08    145  |  117<H>  |  19  ----------------------------<  92  3.6   |  23  |  1.05    Ca    8.2<L>      08 Apr 2024 06:46  Phos  2.5     04-08  Mg     1.9     04-08    TPro  5.8<L>  /  Alb  2.6<L>  /  TBili  1.2  /  DBili  x   /  AST  28  /  ALT  14  /  AlkPhos  62  04-08  A1C with Estimated Average Glucose Result: 5.9 % (04-07-24 @ 09:11)

## 2024-04-08 NOTE — CONSULT NOTE ADULT - REASON FOR ADMISSION
Diarrhea/Gall Stone/Dilated Pancreatic Duct

## 2024-04-08 NOTE — DIETITIAN INITIAL EVALUATION ADULT - ADD RECOMMEND
Liberalize diet to Regular to optimize po/nutrient intake.   Record PO intake in EMR after each meal (nursing.)   MVI w/ minerals daily to ensure 100% RDA met   Ensure plus bid  Consider adding thiamine 100 mg daily 2/2 poor PO intake/ malnutrition  Monitor bowel movements, if no BM for >3 days, consider implementing bowel regimen.   Confirm Goals of Care regarding nutrition support   Monitor PO intake, tolerance, labs and weight.

## 2024-04-08 NOTE — PHYSICAL THERAPY INITIAL EVALUATION ADULT - ADDITIONAL COMMENTS
pt unable to provide accurate Hx. Info obtained from eval 7/2023. pt unable to provide accurate Hx. Info obtained from eval 7/2023. Update 4/8/2023: Pt is not a good historian of PLOF Or home environment.

## 2024-04-08 NOTE — PHYSICAL THERAPY INITIAL EVALUATION ADULT - LEVEL OF INDEPENDENCE: SUPINE/SIT, REHAB EVAL
Pt refusing OOB at this time. See above attmpted PT eval x 2 today. Will attempt to increase mobility on 4/9/2024.

## 2024-04-08 NOTE — CONSULT NOTE ADULT - ASSESSMENT
89 year old male presents with likely acute cholecystitis and a dilated pancreatic duct, 12mm. He has a poor functional status with dementia and of older age. He is therefore a poor surgical candidate as the risks of surgery outweigh the benefits here.    PlanL  Recommend GOC discussion with family  MRCP to evaluate biliary ducts  IR consult for perc. lexa. tube  IV abx  No surgical intervention at this time
Large gallstone with possible gallbladder wall thickening or pericholecystic fluid on CAT scan but no abdominal pain to suggest cholecystitis. Dilated pancreatic duct with atrophy of the pancreas is concerning for neoplasm, but also can be seen with benign conditions such as ampullary stenosis or pancreatic duct stricture, choledocholithiasis, chronic pancreatitis.    - recommend MRI of the abdomen with MRCP to better evaluate both the gallbladder finding and the pancreas finding and rule out neoplasm  - we will start Creon for presumed pancreatic insufficiency given diarrhea issues on presentation  - follow up stool studies that were sent  - case discussed with Dr. Fulton
Pt is a 89y old Male with hx of HTN, Dementia, Depression, BPH presents with diarrhea. Patient AAOx1 at baseline on admission. Information obtained from report and discussion for patient's son Guero. Reported approximately 3-4 episodes of diarrhea in the last 1-2 weeks without blood. Some muscous reported. No reported dizziness, chest pain, SOB, nausea, vomiting, abdominal pain/discomfort.     1. Cholelithiasis/ dilated pancreatic duct with possible pancreatic atrophy  -MRCP negative for acute cholecystitis   -IR consulted no indication for cholecystostomy at present  -WBC, LFTs normal  -started on Creon for presumed pancreatic insufficiency as presented with diarrhea  -concern for neoplasm vs. benign finding per GI  -IV abx for now    2. Moderate protein calorie malnutrition  -appreciate dietary input  -albumin 2.6  -DASH diet  -MVI daily      Process of Care   --Reviewed dx/treatment problems and alignment with Goals of Care       Physical Aspects of Care   --Pain   no nonverbal indicators of pain   c/w current management     --Bowel Regimen   risk for constipation d/t immobility   daily dulcolax as needed     --Dyspnea   No SOB at this time   comfortable and in NAD     --Nausea Vomiting   denies     --Weakness   PT as tolerated         Psychological and Psychiatric Aspects of Care:    --Grief/ Bereavement: emotional support provided   --Hx of psychiatric dx: none   --Pastoral Care Available PRN         Social Aspects of Care   --SW involved         Cultural Aspects   --Primary Language: English           Goals of Care:            We discussed Palliative Care team being a supportive team when a patient has ongoing illnesses.  We also discussed that it is not an end of life care service, but can help navigate symptoms and emotional support throughout their hospital stay here.           Ethical and Legal Aspects: NA           Capacity- A&Ox1 does not have medical decision making capacity         HCP/Surrogate:  wife would be surrogate; emergency contact listed as Brenda Whitaker [phone]          Code Status: DNR/DNI trial NIV    MOLST: MOLST from 4/7/24 with limitations of DNR/DNI trial NIV, determine the use of feeding tube, HD; ok to use IV fluids and antibiotics     Dispo Plan: home with home care          Discussed With: Dr. Zhang coordinated with attending and SW and RN            Time Spent: 75 minutes including the care, coordination and counseling of this patient, 50% of which was spent coordinating and counseling. 
89yo M with gallstone on imaging  - WBC WNL  - Afebrile  - LFT's WNL  - MRI read stating no evidence of acute cholecystitis

## 2024-04-08 NOTE — PHYSICAL THERAPY INITIAL EVALUATION ADULT - PERTINENT HX OF CURRENT PROBLEM, REHAB EVAL
Pt admitted to  secondary to diarrhea. CT abd/pelvis: 3.9 cm fundal gallbladder stone. L2 vertebral body height loss at sup endplate. Markedly dilated pancreatic duct measuring up to 12 mm with pancreatic atrophy.

## 2024-04-08 NOTE — PROGRESS NOTE ADULT - SUBJECTIVE AND OBJECTIVE BOX
pt seen and examined bedside, minimal pain, minimal tenderness. Denies diarrhea. No n/v. CT showing large gallstone and pancreatic duct dilation with pancreas atrophy.          Physical Exam:  General: AOx1, Well developed, NAD  HEENT: NC/AT, has right eye patch, normal pinnae and tragi  Chest: Normal respiratory effort, equal chest rise  Heart: RRR  Abdomen: soft, nontender  Psych: No localized deficits. Normal speech, normal tone, normal affect  Skin: Normal, warm, no rashes, no lesions noted  Extremities: Warm, well perfused, no edema    Vitals:  T(C): 36.6 ( @ 23:08), Max: 37.1 ( @ 07:11)  HR: 74 ( @ 23:08) (74 - 81)  BP: 137/64 ( @ 23:08) (116/80 - 148/56)  RR: 18 ( @ 23:08) (18 - 18)  SpO2: 98% ( @ 23:08) (98% - 99%)         @ 09:11                    11.0  CBC: 8.78>)-------(<152                     33.8                 146 | 118 | 20    CMP:  ----------------------< 108               3.7 | 24 | 1.06                      Ca:7.9  Phos:1.8  M.8               1.1|      |29        LFTs:  ------|79|-----             -|      |-   @ 23:22                    11.3  CBC: 9.68>)-------(<170                     35.3                 149 | 117 | 22    CMP:  ----------------------< 133               3.8 | 22 | 1.12                      Ca:8.7  Phos:-  Mg:-               0.7|      |27        LFTs:  ------|93|-----             -|      |-            Current Inpatient Medications:  acetaminophen     Tablet .. 650 milliGRAM(s) Oral every 6 hours PRN  aluminum hydroxide/magnesium hydroxide/simethicone Suspension 30 milliLiter(s) Oral every 4 hours PRN  cyanocobalamin 1000 MICROGram(s) Oral daily  dextrose 5%. 1000 milliLiter(s) (50 mL/Hr) IV Continuous <Continuous>  gabapentin 600 milliGRAM(s) Oral at bedtime  heparin   Injectable 5000 Unit(s) SubCutaneous every 8 hours  iron sucrose IVPB 200 milliGRAM(s) IV Intermittent every 24 hours  losartan 50 milliGRAM(s) Oral daily  melatonin 3 milliGRAM(s) Oral at bedtime PRN  mirtazapine 15 milliGRAM(s) Oral at bedtime  multivitamin 1 Tablet(s) Oral daily  ondansetron Injectable 4 milliGRAM(s) IV Push every 8 hours PRN  pancrelipase  (CREON 12,000 Lipase Units) 4 Capsule(s) Oral three times a day with meals  piperacillin/tazobactam IVPB.. 3.375 Gram(s) IV Intermittent every 8 hours  QUEtiapine 25 milliGRAM(s) Oral two times a day PRN  sertraline 25 milliGRAM(s) Oral daily       Patient is a 89y old  Male who presents with a chief complaint of Diarrhea/Gall Stone/Dilated Pancreatic Duct (2024 12:48)      pt seen and examined bedside, minimal pain, minimal tenderness. Denies diarrhea. No n/v. CT showing large gallstone and pancreatic duct dilation with pancreas atrophy.      ROS negative except as above    Physical Exam:  General: AOx1, Well developed, NAD  HEENT: NC/AT, has right eye patch, normal pinnae and tragi  Chest: Normal respiratory effort, equal chest rise  Heart: RRR  Abdomen: soft, nontender  Psych: No localized deficits. Normal speech, normal tone, normal affect  Skin: Normal, warm, no rashes, no lesions noted  Extremities: Warm, well perfused, no edema    Vitals:  T(C): 36.6 ( @ 23:08), Max: 37.1 ( @ 07:11)  HR: 74 ( @ 23:08) (74 - 81)  BP: 137/64 ( @ 23:08) (116/80 - 148/56)  RR: 18 ( @ 23:08) (18 - 18)  SpO2: 98% ( @ 23:08) (98% - 99%)         @ 09:11                    11.0  CBC: 8.78>)-------(<152                     33.8                 146 | 118 | 20    CMP:  ----------------------< 108               3.7 | 24 | 1.06                      Ca:7.9  Phos:1.8  M.8               1.1|      |29        LFTs:  ------|79|-----             -|      |-   @ 23:22                    11.3  CBC: 9.68>)-------(<170                     35.3                 149 | 117 | 22    CMP:  ----------------------< 133               3.8 | 22 | 1.12                      Ca:8.7  Phos:-  Mg:-               0.7|      |27        LFTs:  ------|93|-----             -|      |-            Current Inpatient Medications:  acetaminophen     Tablet .. 650 milliGRAM(s) Oral every 6 hours PRN  aluminum hydroxide/magnesium hydroxide/simethicone Suspension 30 milliLiter(s) Oral every 4 hours PRN  cyanocobalamin 1000 MICROGram(s) Oral daily  dextrose 5%. 1000 milliLiter(s) (50 mL/Hr) IV Continuous <Continuous>  gabapentin 600 milliGRAM(s) Oral at bedtime  heparin   Injectable 5000 Unit(s) SubCutaneous every 8 hours  iron sucrose IVPB 200 milliGRAM(s) IV Intermittent every 24 hours  losartan 50 milliGRAM(s) Oral daily  melatonin 3 milliGRAM(s) Oral at bedtime PRN  mirtazapine 15 milliGRAM(s) Oral at bedtime  multivitamin 1 Tablet(s) Oral daily  ondansetron Injectable 4 milliGRAM(s) IV Push every 8 hours PRN  pancrelipase  (CREON 12,000 Lipase Units) 4 Capsule(s) Oral three times a day with meals  piperacillin/tazobactam IVPB.. 3.375 Gram(s) IV Intermittent every 8 hours  QUEtiapine 25 milliGRAM(s) Oral two times a day PRN  sertraline 25 milliGRAM(s) Oral daily    < from: MR MRCP No Cont (24 @ 13:40) >  IMPRESSION:  Dilated and irregular pancreatic duct with ectatic branch ducts and   associated parenchymal atrophy. Findings raise the possibility of   intraductal papillary mucinous neoplasm with main duct involvement.   Chronic pancreatitis is also in the differential diagnosis.    Large gallstone without evidence of acute cholecystitis.    No biliary ductal dilatation choledocholithiasis.    Moderate L2 compression deformity whichis likely acute or subacute.      --- End of Report ---            JANET HUGHES MD; Attending Radiologist  This document has been electronically signed. 2024  2:05PM    < end of copied text >

## 2024-04-08 NOTE — CONSULT NOTE ADULT - PROBLEM SELECTOR RECOMMENDATION 9
- Reviewed with Dr. Figueroa, pt with no clinical or radiologic signs of acute cholecystitis on MRCP, no cholecystostomy indicated In this setting.

## 2024-04-08 NOTE — CONSULT NOTE ADULT - SUBJECTIVE AND OBJECTIVE BOX
HPI: Pt is a 89y old Male with hx of HTN, Dementia, Depression, BPH presents with diarrhea. Patient AAOx1 at baseline on admission. Information obtained from report and discussion for patient's son Janet. Reported approximately 3-4 episodes of diarrhea in the last 1-2 weeks without blood. Some muscous reported. No reported dizziness, chest pain, SOB, nausea, vomiting, abdominal pain/discomfort.     Palliative consulted for GOC.    4/8: Seen and examined at bedside. A&Ox1, poor historian. In NAD.         PAIN: ( )Yes   (X)No  dementia, A&Ox1  no nonverbal indicators of pain    DYSPNEA: ( ) Yes  (X) No  on room air in NAD      PAST MEDICAL & SURGICAL HISTORY:  Hypertension      Anxiety      Hyperlipidemia      OA (osteoarthritis)      Dementia      Depression      BPH (benign prostatic hyperplasia)      History of tonsillectomy      H/O total knee replacement, right        SOCIAL HX:    Hx opiate tolerance ( )YES  ( )NO    Baseline ADLs  (Prior to Admission)  ( ) Independent   (X)Dependent      FAMILY HISTORY:      Review of Systems:    Unable to obtain/Limited due to: dementia      PHYSICAL EXAM:    Vital Signs Last 24 Hrs  T(C): 36.5 (08 Apr 2024 15:47), Max: 36.6 (07 Apr 2024 23:08)  T(F): 97.7 (08 Apr 2024 15:47), Max: 97.9 (07 Apr 2024 23:08)  HR: 72 (08 Apr 2024 15:47) (72 - 74)  BP: 134/64 (08 Apr 2024 15:47) (134/64 - 152/60)  BP(mean): --  RR: 18 (08 Apr 2024 15:47) (18 - 18)  SpO2: 98% (08 Apr 2024 15:47) (95% - 98%)    Parameters below as of 08 Apr 2024 15:47  Patient On (Oxygen Delivery Method): room air      Daily     Daily     PPSV2:  20 %  FAST:    General: confused elderly male in NAD  Mental Status: A&Ox1  HEENT: dry oral mucosa  Lungs: cta b/l  Cardiac: regular rate and rhythm S1S2  GI: abdomen slightly distended +bsx4  : no suprapubic tenderness  Ext: PAGE x4  Neuro: A&Ox1. No focal neuro deficits      LABS:                        10.1   7.07  )-----------( 144      ( 08 Apr 2024 06:46 )             30.2     04-08    145  |  117<H>  |  19  ----------------------------<  92  3.6   |  23  |  1.05    Ca    8.2<L>      08 Apr 2024 06:46  Phos  2.5     04-08  Mg     1.9     04-08    TPro  5.8<L>  /  Alb  2.6<L>  /  TBili  1.2  /  DBili  x   /  AST  28  /  ALT  14  /  AlkPhos  62  04-08    PT/INR - ( 07 Apr 2024 09:11 )   PT: 12.5 sec;   INR: 1.11 ratio         PTT - ( 07 Apr 2024 09:11 )  PTT:31.2 sec  Albumin: Albumin: 2.6 g/dL (04-08 @ 06:46)      Allergies    No Known Allergies    Intolerances      MEDICATIONS  (STANDING):  cyanocobalamin 1000 MICROGram(s) Oral daily  dextrose 5%. 1000 milliLiter(s) (50 mL/Hr) IV Continuous <Continuous>  gabapentin 600 milliGRAM(s) Oral at bedtime  heparin   Injectable 5000 Unit(s) SubCutaneous every 8 hours  iron sucrose IVPB 200 milliGRAM(s) IV Intermittent every 24 hours  losartan 50 milliGRAM(s) Oral daily  mirtazapine 15 milliGRAM(s) Oral at bedtime  multivitamin 1 Tablet(s) Oral daily  pancrelipase  (CREON 12,000 Lipase Units) 4 Capsule(s) Oral three times a day with meals  piperacillin/tazobactam IVPB.. 3.375 Gram(s) IV Intermittent every 8 hours  sertraline 25 milliGRAM(s) Oral daily    MEDICATIONS  (PRN):  acetaminophen     Tablet .. 650 milliGRAM(s) Oral every 6 hours PRN Temp greater or equal to 38C (100.4F), Mild Pain (1 - 3)  aluminum hydroxide/magnesium hydroxide/simethicone Suspension 30 milliLiter(s) Oral every 4 hours PRN Dyspepsia  melatonin 3 milliGRAM(s) Oral at bedtime PRN Insomnia  ondansetron Injectable 4 milliGRAM(s) IV Push every 8 hours PRN Nausea and/or Vomiting  QUEtiapine 25 milliGRAM(s) Oral two times a day PRN agitation      RADIOLOGY/ADDITIONAL STUDIES:  < from: MR MRCP No Cont (04.07.24 @ 13:40) >    ACC: [id number] EXAM:  MR MRCP   ORDERED BY: CRYSTAL KEEN     PROCEDURE DATE:  04/07/2024          INTERPRETATION:  CLINICAL INFORMATION: Dilated pancreatic duct and   gallstones on CT    COMPARISON: April 07, 2024    CONTRAST/COMPLICATIONS:  IV Contrast: NONE  Oral Contrast: NONE  Complications: None reported at time of study completion    PROCEDURE:  MRI of the abdomen was performed.  MRCP was performed.    FINDINGS:  LOWER CHEST: Within normal limits.    LIVER: Hepaticcysts.  BILE DUCTS: Normalcaliber. No choledocholithiasis.  GALLBLADDER: Very large gallstone (4.1 cm). No wall thickening or   pericholecystic inflammation.  SPLEEN: Within normal limits.  PANCREAS: 3.6 x 1.8 cm septated cystic lesion in the pancreatic neck   which was probably present on CT from July 12, 2023 although comparison   is limited by the noncontrast nature of the prior study. There is   irregular dilatation of the pancreatic duct throughout the tail and body,   measuring up to 8 mm. Dilated branch ducts are also noted. A few   additional subcentimeter cystic lesions are noted in the head and   uncinate process.  ADRENALS: Within normal limits.  KIDNEYS/URETERS: Within normal limits.    VISUALIZED PORTIONS:  BOWEL: Severe diffuse colonic diverticulosis without diverticulitis.   Normal appendix. Small hiatal hernia.  PERITONEUM: No ascites.  VESSELS: Within normal limits.  RETROPERITONEUM/LYMPH NODES: No lymphadenopathy.  ABDOMINAL WALL: Within normal limits.  BONES: Moderate L2 compression deformity with abnormal marrow signal,   suggestive of acute/subacute age.    IMPRESSION:  Dilated and irregular pancreatic duct with ectatic branch ducts and   associated parenchymal atrophy. Findings raise the possibility of   intraductal papillary mucinous neoplasm with main duct involvement.   Chronic pancreatitis is also in the differential diagnosis.    Large gallstone without evidence of acute cholecystitis.    No biliary ductal dilatation choledocholithiasis.    Moderate L2 compression deformity whichis likely acute or subacute.      --- End of Report ---            JANET HUGHES MD; Attending Radiologist  This document has been electronically signed. Apr 7 2024  2:05PM    < end of copied text >    < from: CT Abdomen and Pelvis w/ IV Cont (04.07.24 @ 02:54) >    ACC: [id number] EXAM:  CT ABDOMEN AND PELVIS IC   ORDERED BY: MARIA EUGENIA HANNAH     PROCEDURE DATE:  04/07/2024          INTERPRETATION:  CLINICAL INFORMATION: Diarrhea x1 week    COMPARISON: CT abdomen and pelvis 7/12/2023.    CONTRAST/COMPLICATIONS:  IV Contrast: Omnipaque 350  90 cc administered   0 cc discarded  Oral Contrast: NONE  Complications: None reported at time of study completion    PROCEDURE:  CT of the Abdomen and Pelvis was performed.  Sagittal and coronal reformats were performed.    FINDINGS:  LOWER CHEST: Mild cardiomegaly. Mild coronary artery and valvular   calcifications.    LIVER: Left hepatic lobe cyst.  BILE DUCTS: Normal caliber.  GALLBLADDER: Large 3.9 cm fundal gallbladder stone. The gallbladder   mucosa shows prominent enhancement and there is mild gallbladder wall   thickening versus pericholecystic fluid at the fundus. Correlate   clinically for gallbladder disease.  SPLEEN: Within normal limits.  PANCREAS: Markedly dilated pancreatic duct measuring up to 12 mm with   pancreatic atrophy. This is not well evaluated on this examination.   Recommend contrast-enhanced MRI abdomen with MRCP for further evaluation.  ADRENALS: Within normal limits.  KIDNEYS/URETERS: Within normal limits.    BLADDER: Urinary bladderdiverticula.  REPRODUCTIVE ORGANS: Prostate is enlarged.    BOWEL: Small hiatal hernia. No bowel obstruction. Appendix is normal.   Colonic diverticulosis without diverticulitis.  PERITONEUM: No ascites.  VESSELS: Atherosclerotic changes.  RETROPERITONEUM/LYMPH NODES: No lymphadenopathy.  ABDOMINAL WALL: Tiny fat-containing umbilical hernia.  BONES: Degenerative changes. Height loss at the superior endplate of the   L2 vertebral body is age indeterminant but new since the previous exam of   7/12/2023. Correlate clinically for pain at this site.    IMPRESSION:    Large 3.9 cm fundal gallbladder stone. The gallbladder mucosa shows   prominent enhancement and there is mild gallbladder wall thickening   versus pericholecystic fluid at the fundus.Correlate clinically for   gallbladder disease.    Height loss at the superior endplate of the L2 vertebral body is age   indeterminant but new since the previous exam of 7/12/2023. Correlate   clinically for pain at this site.    Markedly dilated pancreatic duct measuring up to 12 mm with pancreatic   atrophy. Possible is not excluded. This is not well evaluated on this   examination. Recommend contrast-enhanced MRI abdomen with MRCP for   further evaluation.      --- End of Report ---            YECENIA ALMAGUER MD; Attending Radiologist  This document has been electronically signed. Apr 7 2024  3:03AM    < end of copied text >  
GI consult    HPI:  89M with PMH of HTN, Dementia, Depression, BPH presents with diarrhea. Patient AAOx1 at baseline on admission. Information obtained from report and discussion for patient's son Guero. Reported approximately 3-4 episodes of diarrhea in the last 1-2 weeks without blood. Some muscous reported. No reported dizziness, chest pain, SOB, nausea, vomiting, abdominal pain/discomfort.     In the ER, Tmax 98.9, HR 91-88, /63, RR 18, SpO2 100% on RA. Hgb 11.3 (.1), Na 149, BUN 22, Cr 1.12 (Baseline 0.9-1.1), LFTs unremarkable, Lipase WNL, UA non-diagnostic. CT abd/pel with Con showing large 3.9cm Fundal Gall Stone, mild GB wall thickening vs pericholecystic fluid and dilated pancreatic duct up to 12mm with possible pancreatic atrophy.     Limited Surgical, Family, Social history due to dementia. Information documented from prior medical records.  (07 Apr 2024 08:37)    Pt not oriented. Denies abd pain. Denies diarrhea. No n/v.  CT reviewed showing large gallstone and pancreatic duct dilation with pancreas atrophy.      PAST MEDICAL & SURGICAL HISTORY:  Hypertension      Anxiety      Hyperlipidemia      OA (osteoarthritis)      Dementia      Depression      BPH (benign prostatic hyperplasia)      History of tonsillectomy      H/O total knee replacement, right          Home Medications:  gabapentin 600 mg oral tablet: 1 tab(s) orally once a day (at bedtime) (07 Apr 2024 08:35)  losartan 50 mg oral tablet: 1 tab(s) orally once a day (07 Apr 2024 08:35)  mirtazapine 15 mg oral tablet: 1 tab(s) orally once a day (at bedtime) (07 Apr 2024 08:36)  Multiple Vitamins oral tablet: 1 tab(s) orally once a day (07 Apr 2024 08:49)  sertraline 25 mg oral tablet: 1 tab(s) orally once a day (07 Apr 2024 08:49)      MEDICATIONS  (STANDING):  dextrose 5%. 1000 milliLiter(s) (50 mL/Hr) IV Continuous <Continuous>  gabapentin 600 milliGRAM(s) Oral at bedtime  heparin   Injectable 5000 Unit(s) SubCutaneous every 8 hours  losartan 50 milliGRAM(s) Oral daily  mirtazapine 15 milliGRAM(s) Oral at bedtime  multivitamin 1 Tablet(s) Oral daily  piperacillin/tazobactam IVPB.. 3.375 Gram(s) IV Intermittent every 8 hours  potassium phosphate IVPB 15 milliMole(s) IV Intermittent once  sertraline 25 milliGRAM(s) Oral daily    MEDICATIONS  (PRN):  acetaminophen     Tablet .. 650 milliGRAM(s) Oral every 6 hours PRN Temp greater or equal to 38C (100.4F), Mild Pain (1 - 3)  aluminum hydroxide/magnesium hydroxide/simethicone Suspension 30 milliLiter(s) Oral every 4 hours PRN Dyspepsia  melatonin 3 milliGRAM(s) Oral at bedtime PRN Insomnia  ondansetron Injectable 4 milliGRAM(s) IV Push every 8 hours PRN Nausea and/or Vomiting  QUEtiapine 25 milliGRAM(s) Oral two times a day PRN agitation      Allergies    No Known Allergies    Intolerances        SOCIAL HISTORY: unable to obtain    FAMILY HISTORY: unable to obtain      ROS  As above  Otherwise unremarkable, all systems reviewed    PE:  Vital Signs Last 24 Hrs  T(C): 36.6 (07 Apr 2024 09:05), Max: 37.2 (07 Apr 2024 05:10)  T(F): 97.9 (07 Apr 2024 09:05), Max: 98.9 (07 Apr 2024 05:10)  HR: 81 (07 Apr 2024 09:05) (74 - 88)  BP: 148/56 (07 Apr 2024 09:05) (127/50 - 153/63)  BP(mean): 77 (07 Apr 2024 07:11) (74 - 98)  RR: 18 (07 Apr 2024 09:05) (18 - 18)  SpO2: 98% (07 Apr 2024 09:05) (95% - 100%)    Parameters below as of 07 Apr 2024 09:05  Patient On (Oxygen Delivery Method): room air        Constitutional: NAD, well-developed, A+Ox3  Anicteric   Respiratory: CTABL, breathing comfortably  Cardiovascular: S1 and S2, RRR  Gastrointestinal: +BS, soft, non tender, mildly distended, no mass  Extremities: warm, well perfused, no edema  Psychiatric: Normal mood, normal affect  Neuro: moves all extremities, grossly intact  Skin: No rashes or lesions    LABS:                        11.0   8.78  )-----------( 152      ( 07 Apr 2024 09:11 )             33.8     04-07    146<H>  |  118<H>  |  20  ----------------------------<  108<H>  3.7   |  24  |  1.06    Ca    7.9<L>      07 Apr 2024 09:11  Phos  1.8     04-07  Mg     1.8     04-07    TPro  6.3  /  Alb  2.9<L>  /  TBili  1.1  /  DBili  x   /  AST  29  /  ALT  15  /  AlkPhos  79  04-07    PT/INR - ( 07 Apr 2024 09:11 )   PT: 12.5 sec;   INR: 1.11 ratio         PTT - ( 07 Apr 2024 09:11 )  PTT:31.2 sec  LIVER FUNCTIONS - ( 07 Apr 2024 09:11 )  Alb: 2.9 g/dL / Pro: 6.3 gm/dL / ALK PHOS: 79 U/L / ALT: 15 U/L / AST: 29 U/L / GGT: x             RADIOLOGY & ADDITIONAL STUDIES:    ACC: [id number] EXAM:  CT ABDOMEN AND PELVIS IC   ORDERED BY: MARIA EUGENIA HANNAH     PROCEDURE DATE:  04/07/2024          INTERPRETATION:  CLINICAL INFORMATION: Diarrhea x1 week    COMPARISON: CT abdomen and pelvis 7/12/2023.    CONTRAST/COMPLICATIONS:  IV Contrast: Omnipaque 350  90 cc administered   0 cc discarded  Oral Contrast: NONE  Complications: None reported at time of study completion    PROCEDURE:  CT of the Abdomen and Pelvis was performed.  Sagittal and coronal reformats were performed.    FINDINGS:  LOWER CHEST: Mild cardiomegaly. Mild coronary artery and valvular   calcifications.    LIVER: Left hepatic lobe cyst.  BILE DUCTS: Normal caliber.  GALLBLADDER: Large 3.9 cm fundal gallbladder stone. The gallbladder   mucosa shows prominent enhancement and there is mild gallbladder wall   thickening versus pericholecystic fluid at the fundus. Correlate   clinically for gallbladder disease.  SPLEEN: Within normal limits.  PANCREAS: Markedly dilated pancreatic duct measuring up to 12 mm with   pancreatic atrophy. This is not well evaluated on this examination.   Recommend contrast-enhanced MRI abdomen with MRCP for further evaluation.  ADRENALS: Within normal limits.  KIDNEYS/URETERS: Within normal limits.    BLADDER: Urinary bladder diverticula.  REPRODUCTIVE ORGANS: Prostate is enlarged.    BOWEL: Small hiatal hernia. No bowel obstruction. Appendix is normal.   Colonic diverticulosis without diverticulitis.  PERITONEUM: No ascites.  VESSELS: Atherosclerotic changes.  RETROPERITONEUM/LYMPH NODES: No lymphadenopathy.  ABDOMINAL WALL: Tiny fat-containing umbilical hernia.  BONES: Degenerative changes. Height loss at the superior endplate of the   L2 vertebral body is age indeterminant but new since the previous exam of   7/12/2023. Correlate clinically for pain at this site.    IMPRESSION:    Large 3.9 cm fundal gallbladder stone. The gallbladder mucosa shows   prominent enhancement and there is mild gallbladder wall thickening   versus pericholecystic fluid at the fundus. Correlate clinically for   gallbladder disease.    Height loss at the superior endplate of the L2 vertebral body is age   indeterminant but new since the previous exam of 7/12/2023. Correlate   clinically for pain at this site.    Markedly dilated pancreatic duct measuring up to 12 mm with pancreatic   atrophy. Possible is not excluded. This is not well evaluated on this   examination. Recommend contrast-enhanced MRI abdomen with MRCP for   further evaluation.      --- End of Report ---            YECENIA ALMAGUER MD; Attending Radiologist  This document has been electronically signed. Apr 7 2024  3:03AM
Chief Complaint:  Patient is a 89y old  Male who presents with a chief complaint of large gallstone    HPI:  89M with PMH of HTN, Dementia, Depression, BPH presents with diarrhea. Patient AAOx1 at baseline on admission. Information obtained from report and discussion for patient's son Guero. Reported approximately 3-4 episodes of diarrhea in the last 1-2 weeks without blood. Some muscous reported. No reported dizziness, chest pain, SOB, nausea, vomiting, abdominal pain/discomfort.     Allergies  No Known Allergies    MEDICATIONS  (STANDING):  cyanocobalamin 1000 MICROGram(s) Oral daily  dextrose 5%. 1000 milliLiter(s) (50 mL/Hr) IV Continuous <Continuous>  gabapentin 600 milliGRAM(s) Oral at bedtime  heparin   Injectable 5000 Unit(s) SubCutaneous every 8 hours  iron sucrose IVPB 200 milliGRAM(s) IV Intermittent every 24 hours  losartan 50 milliGRAM(s) Oral daily  mirtazapine 15 milliGRAM(s) Oral at bedtime  multivitamin 1 Tablet(s) Oral daily  pancrelipase  (CREON 12,000 Lipase Units) 4 Capsule(s) Oral three times a day with meals  piperacillin/tazobactam IVPB.. 3.375 Gram(s) IV Intermittent every 8 hours  sertraline 25 milliGRAM(s) Oral daily    MEDICATIONS  (PRN):  acetaminophen     Tablet .. 650 milliGRAM(s) Oral every 6 hours PRN Temp greater or equal to 38C (100.4F), Mild Pain (1 - 3)  aluminum hydroxide/magnesium hydroxide/simethicone Suspension 30 milliLiter(s) Oral every 4 hours PRN Dyspepsia  melatonin 3 milliGRAM(s) Oral at bedtime PRN Insomnia  ondansetron Injectable 4 milliGRAM(s) IV Push every 8 hours PRN Nausea and/or Vomiting  QUEtiapine 25 milliGRAM(s) Oral two times a day PRN agitation      PAST MEDICAL & SURGICAL HISTORY:  Hypertension      Anxiety      Hyperlipidemia      OA (osteoarthritis)      Dementia      Depression      BPH (benign prostatic hyperplasia)      History of tonsillectomy      H/O total knee replacement, right      Vital Signs Last 24 Hrs  T(C): 36.2 (08 Apr 2024 08:56), Max: 36.6 (07 Apr 2024 14:04)  T(F): 97.2 (08 Apr 2024 08:56), Max: 97.9 (07 Apr 2024 14:04)  HR: 74 (08 Apr 2024 08:56) (74 - 77)  BP: 152/60 (08 Apr 2024 08:56) (116/80 - 152/60)  BP(mean): --  RR: 18 (08 Apr 2024 08:56) (18 - 18)  SpO2: 95% (08 Apr 2024 08:56) (95% - 99%)    Parameters below as of 08 Apr 2024 08:56  Patient On (Oxygen Delivery Method): room air      GENERAL APPEARANCE: Well developed, in no acute distress.    LUNGS: Auscultation of the lungs revealed normal breath sounds without any other adventitious sounds or rubs.    CARDIOVASCULAR: There was a regular rate and rhythm    ABDOMEN: Soft and nontender with normal bowel sounds.     NEUROLOGIC: Alert and oriented x 3. Normal affect.         CBC                        10.1   7.07  )-----------( 144      ( 08 Apr 2024 06:46 )             30.2       Chemistry  04-08    145  |  117<H>  |  19  ----------------------------<  92  3.6   |  23  |  1.05    Ca    8.2<L>      08 Apr 2024 06:46  Phos  2.5     04-08  Mg     1.9     04-08    TPro  5.8<L>  /  Alb  2.6<L>  /  TBili  1.2  /  DBili  x   /  AST  28  /  ALT  14  /  AlkPhos  62  04-08      PT/INR - ( 07 Apr 2024 09:11 )   PT: 12.5 sec;   INR: 1.11 ratio         PTT - ( 07 Apr 2024 09:11 )  PTT:31.2 sec    
Hx obtained from medical chart as patient has dementia  89 yo M presents with diarrhea and nausea for an hr at home. He lives with wife and son. Patient currently diarrhea any pain, nausea or vomiting.      ROS neg except as above.    PMH: Dementia, MDD, BERNARDO, OA, heart murmur  Allergies: as per chart  Meds: as per chart  PSH: Right total knee replacement   Sohx: no tobacco, etoh, or illicit drug use    Physical Exam:  General: AOx1, Well developed, NAD  HEENT: NC/AT, has right eye patch, normal pinnae and tragi  Chest: Normal respiratory effort, equal chest rise  Heart: RRR  Abdomen: Distended, soft, nontender  Psych: No localized deficits. Normal speech, normal tone, normal affect  Skin: Normal, warm, no rashes, no lesions noted  Extremities: Warm, well perfused, no edema    Vitals:  T(C): 36.8 (04-07 @ 03:27), Max: 36.8 (04-07 @ 03:27)  HR: 74 (04-07 @ 03:27) (74 - 81)  BP: 127/50 (04-07 @ 03:27) (127/50 - 153/63)  RR: 18 (04-07 @ 03:27) (18 - 18)  SpO2: 95% (04-07 @ 03:27) (95% - 100%)      04-06 @ 23:22                    11.3  CBC: 9.68>)-------(<170                     35.3                 149 | 117 | 22    CMP:  ----------------------< 133               3.8 | 22 | 1.12                      Ca:8.7  Phos:-  Mg:-               0.7|      |27        LFTs:  ------|93|-----             -|      |-

## 2024-04-08 NOTE — DIETITIAN INITIAL EVALUATION ADULT - OTHER INFO
89M with PMH of HTN, Dementia, Depression, BPH presents with diarrhea. Patient AAOx1 at baseline on admission. Information obtained from report and discussion for patient's son Guero. Reported approximately 3-4 episodes of diarrhea in the last 1-2 weeks without blood. Some muscous reported. No reported dizziness, chest pain, SOB, nausea, vomiting, abdominal pain/discomfort.  89M with PMH of HTN, Dementia, Depression, BPH presents with diarrhea. Patient AAOx1 at baseline on admission. Information obtained from report and discussion for patient's son Guero. Reported approximately 3-4 episodes of diarrhea in the last 1-2 weeks without blood. Some muscous reported. No reported dizziness, chest pain, SOB, nausea, vomiting, abdominal pain/discomfort.     Admit dx is diarrhea  PT has dementia, and barely answers any questions.   Visited pt at bedside, pt eating breakfast having trouble with knife and fork.  RD bedscale wt is 81 kg   178#  NFPE reveals muscle wasting, fat wasting  PO intake estimated likely < 75% ENN > one month   MOLST indicates acceptance of trial of Feeding Tube  on 4/7 pt had small amounts of loose, brown stool  No note of diarrhea  Recommendations to follow in Plan/Intervention

## 2024-04-08 NOTE — PROGRESS NOTE ADULT - ASSESSMENT
A/P    Cholelithiasis without Acute Cholecystitis  Dilated Pancreatic Duct with possible pancreatic Atrophy vs malignancy. Unable to clinically determine at this time  Diarrhea likely from pancreatic insufficiency  Associated Hypernatremia  -Med/surg  -IVF as needed for fluid balance  -Continue to monitor Cr/Electrolytes and correct electrolytes accordingly  -Surgery consult/recs  -MRCP  -NPO for now->regular diet after MRCP  -Empiric Antibiotics. No Sepsis POA.   -Pain control/Bowel regimen. Diarrhea improved with initiation of creon.     HTN  -Home losartan. Titrate Rx accordingly.       Macrocytic Anemia  -No overt signs of bleeding  -Continue to monitor  -Ferritin/Iron/B12/Folate  -Iron low->venofer  -B12 low normal-> Injec+Oral supplementation    Dementia  Depression  -Continue Remeron/Sertraline  -Continue seroquel PRN    DVT Prophylaxis: heparin subq A/P    Cholelithiasis without Acute Cholecystitis  Dilated Pancreatic Duct with possible pancreatic Atrophy vs malignancy. Unable to clinically determine at this time  Diarrhea likely from pancreatic insufficiency  Associated Hypernatremia  -Med/surg  -IVF as needed for fluid balance  -Continue to monitor Cr/Electrolytes and correct electrolytes accordingly  -Surgery consult/recs  -MRCP findings as above. Surgery/GI recs  -Empiric Antibiotics. No Sepsis POA.   -Pain control/Bowel regimen. Diarrhea improved with initiation of creon.     HTN  -Home losartan. Titrate Rx accordingly.       Macrocytic Anemia  -No overt signs of bleeding  -Continue to monitor  -Ferritin/Iron/B12/Folate  -Iron low->venofer  -B12 low normal-> Injec+Oral supplementation    Dementia  Depression  -Continue Remeron/Sertraline  -Continue seroquel PRN    DVT Prophylaxis: heparin subq

## 2024-04-08 NOTE — PROGRESS NOTE ADULT - ASSESSMENT
89 year old male presents with likely acute cholecystitis and a dilated pancreatic duct, 12mm. He has a poor functional status with dementia and of older age. He is therefore a poor surgical candidate as the risks of surgery outweigh the benefits here.    Plan:  Recommend GOC discussion with family  MRCP to evaluate biliary ducts  IR consult for perc. lexa. tube  IV abx  GI consult   No surgical intervention at this  89 year old male presents with likely acute cholecystitis and a dilated pancreatic duct, 12mm. He has a poor functional status with dementia and of older age. He is therefore a poor surgical candidate as the risks of surgery outweigh the benefits here.    Plan:  Recommend GOC discussion with family  MRCP to evaluate biliary ducts  IR consult for perc. lexa. tube  IV abx  GI consult   No surgical intervention at this     addendum:  - MRCP reviewed, no sign of acute cholecystitis and patient exam congruent. Low suspicious for acute cholecystitis, no surgical intervention warranted. If patient develops acute cholecystitis, would benefit from cholecystostomy tube, however does not require one at this time. Please reconsult as needed 89 year old male presents with likely acute cholecystitis and a dilated pancreatic duct, 12mm. He has a poor functional status with dementia and of older age. He is therefore a poor surgical candidate as the risks of surgery outweigh the benefits here.    Plan:  Recommend GOC discussion with family  MRCP to evaluate biliary ducts  IR consult for perc. lexa. tube  IV abx  GI consult   No surgical intervention at this     addendum:  - MRCP reviewed, no sign of acute cholecystitis and patient exam congruent. Low suspicious for acute cholecystitis, no surgical intervention warranted. If patient develops acute cholecystitis, would benefit from cholecystostomy tube, however does not require one at this time. Please reconsult as needed    Attending A/P:    Chart reviewed, patient examined, agree with above resident evaluation in addition to the following    non tender, diarrhea mostly resolved, IPMN on MRI, no evidence of cholecystitis, would defer to patient's health care proxy regarding further w/u of IPMN with GI due to underlying dementia and advanced age, please call with any concerns    Pt and his family aware of and agree with all of the above    35 minuted of time spent on pt examination, review of relevant labs and radiologic studies, assured stabilization of pt, discussion with relevant services/providers for coordination of pt care and services

## 2024-04-08 NOTE — PROGRESS NOTE ADULT - SUBJECTIVE AND OBJECTIVE BOX
GI    HPI:  not oriented  no pain  no n/v  MRI reviewed    ROS  As above  Otherwise unremarkable, all systems reviewed    PE:  Vital Signs Last 24 Hrs  T(C): 36.5 (08 Apr 2024 15:47), Max: 36.6 (07 Apr 2024 23:08)  T(F): 97.7 (08 Apr 2024 15:47), Max: 97.9 (07 Apr 2024 23:08)  HR: 72 (08 Apr 2024 15:47) (72 - 74)  BP: 134/64 (08 Apr 2024 15:47) (134/64 - 152/60)  BP(mean): --  RR: 18 (08 Apr 2024 15:47) (18 - 18)  SpO2: 98% (08 Apr 2024 15:47) (95% - 98%)    Parameters below as of 08 Apr 2024 15:47  Patient On (Oxygen Delivery Method): room air        Constitutional: NAD, well-developed, A+Ox1-2  Anicteric   Respiratory: CTABL, breathing comfortably  Cardiovascular: S1 and S2, RRR  Gastrointestinal: +BS, soft, non tender, mildly distended, no mass  Extremities: warm, well perfused, no edema  Psychiatric: confused  Neuro: moves all extremities, grossly intact  Skin: No rashes or lesions    LABS:                             ACC: [id number] EXAM:  MR MRCP   ORDERED BY: CRYSTAL KEEN     PROCEDURE DATE:  04/07/2024          INTERPRETATION:  CLINICAL INFORMATION: Dilated pancreatic duct and   gallstones on CT    COMPARISON: April 07, 2024    CONTRAST/COMPLICATIONS:  IV Contrast: NONE  Oral Contrast: NONE  Complications: None reported at time of study completion    PROCEDURE:  MRI of the abdomen was performed.  MRCP was performed.    FINDINGS:  LOWER CHEST: Within normal limits.    LIVER: Hepaticcysts.  BILE DUCTS: Normal caliber. No choledocholithiasis.  GALLBLADDER: Very large gallstone (4.1 cm). No wall thickening or   pericholecystic inflammation.  SPLEEN: Within normal limits.  PANCREAS: 3.6 x 1.8 cm septated cystic lesion in the pancreatic neck   which was probably present on CT from July 12, 2023 although comparison   is limited by the noncontrast nature of the prior study. There is   irregular dilatation of the pancreatic duct throughout the tail and body,   measuring up to 8 mm. Dilated branch ducts are also noted. A few   additional subcentimeter cystic lesions are noted in the head and   uncinate process.  ADRENALS: Within normal limits.  KIDNEYS/URETERS: Within normal limits.    VISUALIZED PORTIONS:  BOWEL: Severe diffuse colonic diverticulosis without diverticulitis.   Normal appendix. Small hiatal hernia.  PERITONEUM: No ascites.  VESSELS: Within normal limits.  RETROPERITONEUM/LYMPH NODES: No lymphadenopathy.  ABDOMINAL WALL: Within normal limits.  BONES: Moderate L2 compression deformity with abnormal marrow signal,   suggestive of acute/subacute age.    IMPRESSION:  Dilated and irregular pancreatic duct with ectatic branch ducts and   associated parenchymal atrophy. Findings raise the possibility of   intraductal papillary mucinous neoplasm with main duct involvement.   Chronic pancreatitis is also in the differential diagnosis.    Large gallstone without evidence of acute cholecystitis.    No biliary ductal dilatation choledocholithiasis.    Moderate L2 compression deformity which is likely acute or subacute.      --- End of Report ---            JANET HUGHES MD; Attending Radiologist  This document has been electronically signed. Apr 7 2024  2:05PM

## 2024-04-08 NOTE — PHYSICAL THERAPY INITIAL EVALUATION ADULT - PLANNED THERAPY INTERVENTIONS, PT EVAL
Increase mobility when possible. Eval. Pt left in bed with all observation section equipment/material intact and bed alarm activated. PAINAD- 0/10. Will cont to follow./bed mobility training/gait training/ROM/strengthening/transfer training

## 2024-04-08 NOTE — PROGRESS NOTE ADULT - ASSESSMENT
Large gallstone with possible gallbladder wall thickening or pericholecystic fluid on CAT scan but no abdominal pain to suggest cholecystitis. Dilated pancreatic duct with atrophy of the pancreas is concerning for neoplasm, but also can be seen with benign conditions such as ampullary stenosis or pancreatic duct stricture, choledocholithiasis, chronic pancreatitis.    MRI shows "Dilated and irregular pancreatic duct with ectatic branch ducts and   associated parenchymal atrophy. Findings raise the possibility of   intraductal papillary mucinous neoplasm with main duct involvement.   Chronic pancreatitis is also in the differential diagnosis."      - cont Creon for presumed pancreatic insufficiency given diarrhea issues on presentation  - given age and advanced dementia he is not a good candidate for pancreas surgery and therefore EUS/bx would not change mgmt. Would not recommend pursuing pancreas findings further.

## 2024-04-08 NOTE — CONSULT NOTE ADULT - NS ATTEND AMEND GEN_ALL_CORE FT
agree w/ above npte  pt confused w/o capacity   will follow up   monitor patients symptoms closely  goc meeting pending

## 2024-04-08 NOTE — PHYSICAL THERAPY INITIAL EVALUATION ADULT - PASSIVE RANGE OF MOTION EXAMINATION, REHAB EVAL
Bilateral hip flex ~ 90 degrees, and bilateral DF neutral./Left UE Passive ROM was WFL (within functional limits)/Right UE Passive ROM was WFL (within functional limits)/Left LE Passive ROM was WFL (within functional limits)/Right LE Passive ROM was WFL (within functional limits)

## 2024-04-08 NOTE — DIETITIAN INITIAL EVALUATION ADULT - PERTINENT MEDS FT
MEDICATIONS  (STANDING):  cyanocobalamin 1000 MICROGram(s) Oral daily  dextrose 5%. 1000 milliLiter(s) (50 mL/Hr) IV Continuous <Continuous>  gabapentin 600 milliGRAM(s) Oral at bedtime  heparin   Injectable 5000 Unit(s) SubCutaneous every 8 hours  iron sucrose IVPB 200 milliGRAM(s) IV Intermittent every 24 hours  losartan 50 milliGRAM(s) Oral daily  mirtazapine 15 milliGRAM(s) Oral at bedtime  multivitamin 1 Tablet(s) Oral daily  pancrelipase  (CREON 12,000 Lipase Units) 4 Capsule(s) Oral three times a day with meals  piperacillin/tazobactam IVPB.. 3.375 Gram(s) IV Intermittent every 8 hours  sertraline 25 milliGRAM(s) Oral daily    MEDICATIONS  (PRN):  acetaminophen     Tablet .. 650 milliGRAM(s) Oral every 6 hours PRN Temp greater or equal to 38C (100.4F), Mild Pain (1 - 3)  aluminum hydroxide/magnesium hydroxide/simethicone Suspension 30 milliLiter(s) Oral every 4 hours PRN Dyspepsia  melatonin 3 milliGRAM(s) Oral at bedtime PRN Insomnia  ondansetron Injectable 4 milliGRAM(s) IV Push every 8 hours PRN Nausea and/or Vomiting  QUEtiapine 25 milliGRAM(s) Oral two times a day PRN agitation

## 2024-04-08 NOTE — DIETITIAN INITIAL EVALUATION ADULT - ORAL INTAKE PTA/DIET HISTORY
PT is elderly and has AMS.  When asked who shops and cooks for him, he answered "My mother."  Pt was eating breakfast, and having a difficult time with   the control of his knife and fork.  PO intake estimated < 75% ENN > one month

## 2024-04-08 NOTE — PHYSICAL THERAPY INITIAL EVALUATION ADULT - GENERAL OBSERVATIONS, REHAB EVAL
Attempted eval x 2 today: pt eating at first attempt, and second attempt pt refusing OOB performed bed eval. Pt found supine in bed with bed alarm activated. Noted right eye closure. PAINAD- 0 /10.

## 2024-04-09 LAB
ANION GAP SERPL CALC-SCNC: 3 MMOL/L — LOW (ref 5–17)
BUN SERPL-MCNC: 18 MG/DL — SIGNIFICANT CHANGE UP (ref 7–23)
CALCIUM SERPL-MCNC: 8.3 MG/DL — LOW (ref 8.5–10.1)
CHLORIDE SERPL-SCNC: 120 MMOL/L — HIGH (ref 96–108)
CO2 SERPL-SCNC: 24 MMOL/L — SIGNIFICANT CHANGE UP (ref 22–31)
CREAT SERPL-MCNC: 1.08 MG/DL — SIGNIFICANT CHANGE UP (ref 0.5–1.3)
EGFR: 64 ML/MIN/1.73M2 — SIGNIFICANT CHANGE UP
GLUCOSE SERPL-MCNC: 104 MG/DL — HIGH (ref 70–99)
HCT VFR BLD CALC: 29.1 % — LOW (ref 39–50)
HGB BLD-MCNC: 9.9 G/DL — LOW (ref 13–17)
MAGNESIUM SERPL-MCNC: 1.8 MG/DL — SIGNIFICANT CHANGE UP (ref 1.6–2.6)
PHOSPHATE SERPL-MCNC: 2.4 MG/DL — LOW (ref 2.5–4.5)
POTASSIUM SERPL-MCNC: 3.3 MMOL/L — LOW (ref 3.5–5.3)
POTASSIUM SERPL-SCNC: 3.3 MMOL/L — LOW (ref 3.5–5.3)
SODIUM SERPL-SCNC: 147 MMOL/L — HIGH (ref 135–145)

## 2024-04-09 PROCEDURE — 99232 SBSQ HOSP IP/OBS MODERATE 35: CPT

## 2024-04-09 RX ORDER — POTASSIUM CHLORIDE 20 MEQ
40 PACKET (EA) ORAL ONCE
Refills: 0 | Status: COMPLETED | OUTPATIENT
Start: 2024-04-09 | End: 2024-04-09

## 2024-04-09 RX ORDER — SODIUM CHLORIDE 9 MG/ML
1000 INJECTION, SOLUTION INTRAVENOUS
Refills: 0 | Status: COMPLETED | OUTPATIENT
Start: 2024-04-09 | End: 2024-04-09

## 2024-04-09 RX ORDER — DEXTROSE MONOHYDRATE, SODIUM CHLORIDE, AND POTASSIUM CHLORIDE 50; .745; 4.5 G/1000ML; G/1000ML; G/1000ML
1000 INJECTION, SOLUTION INTRAVENOUS
Refills: 0 | Status: DISCONTINUED | OUTPATIENT
Start: 2024-04-09 | End: 2024-04-09

## 2024-04-09 RX ORDER — SODIUM,POTASSIUM PHOSPHATES 278-250MG
1 POWDER IN PACKET (EA) ORAL THREE TIMES A DAY
Refills: 0 | Status: COMPLETED | OUTPATIENT
Start: 2024-04-09 | End: 2024-04-09

## 2024-04-09 RX ADMIN — Medication 1 PACKET(S): at 21:37

## 2024-04-09 RX ADMIN — MIRTAZAPINE 15 MILLIGRAM(S): 45 TABLET, ORALLY DISINTEGRATING ORAL at 21:36

## 2024-04-09 RX ADMIN — SERTRALINE 25 MILLIGRAM(S): 25 TABLET, FILM COATED ORAL at 09:39

## 2024-04-09 RX ADMIN — IRON SUCROSE 110 MILLIGRAM(S): 20 INJECTION, SOLUTION INTRAVENOUS at 04:15

## 2024-04-09 RX ADMIN — Medication 1 TABLET(S): at 09:39

## 2024-04-09 RX ADMIN — Medication 4 CAPSULE(S): at 09:40

## 2024-04-09 RX ADMIN — QUETIAPINE FUMARATE 25 MILLIGRAM(S): 200 TABLET, FILM COATED ORAL at 18:23

## 2024-04-09 RX ADMIN — PIPERACILLIN AND TAZOBACTAM 25 GRAM(S): 4; .5 INJECTION, POWDER, LYOPHILIZED, FOR SOLUTION INTRAVENOUS at 05:57

## 2024-04-09 RX ADMIN — Medication 3 MILLIGRAM(S): at 21:36

## 2024-04-09 RX ADMIN — Medication 1 PACKET(S): at 13:00

## 2024-04-09 RX ADMIN — Medication 1 PACKET(S): at 09:39

## 2024-04-09 RX ADMIN — GABAPENTIN 600 MILLIGRAM(S): 400 CAPSULE ORAL at 21:37

## 2024-04-09 RX ADMIN — HEPARIN SODIUM 5000 UNIT(S): 5000 INJECTION INTRAVENOUS; SUBCUTANEOUS at 21:36

## 2024-04-09 RX ADMIN — SODIUM CHLORIDE 150 MILLILITER(S): 9 INJECTION, SOLUTION INTRAVENOUS at 13:00

## 2024-04-09 RX ADMIN — HEPARIN SODIUM 5000 UNIT(S): 5000 INJECTION INTRAVENOUS; SUBCUTANEOUS at 13:23

## 2024-04-09 RX ADMIN — Medication 4 CAPSULE(S): at 13:02

## 2024-04-09 RX ADMIN — HEPARIN SODIUM 5000 UNIT(S): 5000 INJECTION INTRAVENOUS; SUBCUTANEOUS at 09:39

## 2024-04-09 RX ADMIN — Medication 40 MILLIEQUIVALENT(S): at 13:01

## 2024-04-09 RX ADMIN — LOSARTAN POTASSIUM 50 MILLIGRAM(S): 100 TABLET, FILM COATED ORAL at 09:39

## 2024-04-09 RX ADMIN — PREGABALIN 1000 MICROGRAM(S): 225 CAPSULE ORAL at 09:38

## 2024-04-09 RX ADMIN — Medication 4 CAPSULE(S): at 18:23

## 2024-04-09 NOTE — PROGRESS NOTE ADULT - ASSESSMENT
Handoff report received from night shift LILIAN Salazar. Patient currently resting in stretcher, in no acute distress, denies any complaints. Will continue to monitor patient. A/P    Cholelithiasis without Acute Cholecystitis  Dilated Pancreatic Duct with possible pancreatic Atrophy vs malignancy. Unable to clinically determine at this time  Diarrhea likely from pancreatic insufficiency  Associated Hypernatremia  -Med/surg  -IVF as needed for fluid balance  -Continue to monitor Cr/Electrolytes and correct electrolytes accordingly  -Surgery consult/recs  -MRCP findings as above. Surgery/GI recs  -Empiric Antibiotics. No Sepsis POA. ->Culture negative. No acute lexa->discontinue antibotics.   -Pain control/Bowel regimen. Diarrhea improved with initiation of creon. suggestive of underlying pancreatic insufficiency.     HTN  -Home losartan. Titrate Rx accordingly.       Macrocytic Anemia  -No overt signs of bleeding  -Continue to monitor  -Ferritin/Iron/B12/Folate  -Iron low->venofer  -B12 low normal-> Injec+Oral supplementation    Dementia  Depression  -Continue Remeron/Sertraline  -Continue seroquel PRN    DVT Prophylaxis: heparin subq

## 2024-04-09 NOTE — PROGRESS NOTE ADULT - SUBJECTIVE AND OBJECTIVE BOX
CHIEF COMPLAINT: Diarrhea    SUBJECTIVE/SIGNIFICANT INTERVAL EVENTS/OVERNIGHT EVENTS:    4/8: Diarrhea near resolved. MRCP findings noted. No clear neoplasm but could be suggestive of one. No acute lexa. patient denies pain, nasuea, vomiting. Limited ROS/CC in the setting of baseline dementia.     4/9: Clinically stable. Na slightly higher. Continue D5. MRCP findings discussed with son. Discussed with GI->conservative management. Family requesting Dr. Alfaro to evaluate findings. Discussed with soniya and lena and agreeable. Pending further recs. No more diarrhea. Creon appears to be effective.     Review of Systems: 14 Point review of systems reviewed and reported as negative unless otherwise stated above    FROM H&P:  "89M with PMH of HTN, Dementia, Depression, BPH presents with diarrhea. Patient AAOx1 at baseline on admission. Information obtained from report and discussion for patient's son Guero. Reported approximately 3-4 episodes of diarrhea in the last 1-2 weeks without blood. Some muscous reported. No reported dizziness, chest pain, SOB, nausea, vomiting, abdominal pain/discomfort.     In the ER, Tmax 98.9, HR 91-88, /63, RR 18, SpO2 100% on RA. Hgb 11.3 (.1), Na 149, BUN 22, Cr 1.12 (Baseline 0.9-1.1), LFTs unremarkable, Lipase WNL, UA non-diagnostic. CT abd/pel with Con showing large 3.9cm Fundal Gall Stone, mild GB wall thickening vs pericholecystic fluid and dilated pancreatic duct up to 12mm with possible pancreatic atrophy.     Limited Surgical, Family, Social history due to dementia. Information documented from prior medical records. 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   Neck: Non-tender; No JVD  CVS: RRR, S1&S2, No murmur, No edema  Respiratory: Lungs CTA B/L; Normal Respiratory Effort  Abdomen/GI: Soft, non-tender, non-distended, no guarding, no rebound, normal bowel sounds  : No bladder distention, No Li  Extremities: No cyanosis, No clubbing, No edema  MSK: No CVA tenderness, Normal ROM, No injury  Neuro: AAOx1, CNII-XII grossly intact, non-focal  Psych: Appropriate, Cooperative,   Skin: Clean, Dry and Intact      LABS:                          10.1   7.07  )-----------( 144      ( 08 Apr 2024 06:46 )             30.2     04-08    145  |  117<H>  |  19  ----------------------------<  92  3.6   |  23  |  1.05    Ca    8.2<L>      08 Apr 2024 06:46  Phos  2.5     04-08  Mg     1.9     04-08    TPro  5.8<L>  /  Alb  2.6<L>  /  TBili  1.2  /  DBili  x   /  AST  28  /  ALT  14  /  AlkPhos  62  04-08    SARS-CoV-2: NotDetec (11 Nov 2023 16:13)    CAPILLARY BLOOD GLUCOSE          Culture - Blood (collected 07 Apr 2024 05:02)  Source: .Blood None  Preliminary Report (08 Apr 2024 11:02):    No growth at 24 hours    Culture - Blood (collected 07 Apr 2024 05:02)  Source: .Blood None  Preliminary Report (08 Apr 2024 11:02):    No growth at 24 hours    Culture - Urine (collected 07 Apr 2024 01:24)  Source: Clean Catch None  Final Report (08 Apr 2024 16:26):    No growth          RADIOLOGY:  < from: MR MRCP No Cont (04.07.24 @ 13:40) >  IMPRESSION:  Dilated and irregular pancreatic duct with ectatic branch ducts and   associated parenchymal atrophy. Findings raise the possibility of   intraductal papillary mucinous neoplasm with main duct involvement.   Chronic pancreatitis is also in the differential diagnosis.    Large gallstone without evidence of acute cholecystitis.    No biliary ductal dilatation choledocholithiasis.    Moderate L2 compression deformity whichis likely acute or subacute.    < end of copied text >  < from: CT Abdomen and Pelvis w/ IV Cont (04.07.24 @ 02:54) >  IMPRESSION:    Large 3.9 cm fundal gallbladder stone. The gallbladder mucosa shows   prominent enhancement and there is mild gallbladder wall thickening   versus pericholecystic fluid at the fundus.Correlate clinically for   gallbladder disease.    Height loss at the superior endplate of the L2 vertebral body is age   indeterminant but new since the previous exam of 7/12/2023. Correlate   clinically for pain at this site.    Markedly dilated pancreatic duct measuring up to 12 mm with pancreatic   atrophy. Possible is not excluded. This is not well evaluated on this   examination. Recommend contrast-enhanced MRI abdomen with MRCP for   further evaluation.    < end of copied text >            I personally reviewed labs, imaging, ekg, orders and vitals.    Discussed case with:  [X]RN  [X]CM/SW  [X]Patient  [X]Family  [X]Specialist: GI        MEDICATIONS  (STANDING):  cyanocobalamin 1000 MICROGram(s) Oral daily  dextrose 5%. 1000 milliLiter(s) (50 mL/Hr) IV Continuous <Continuous>  gabapentin 600 milliGRAM(s) Oral at bedtime  heparin   Injectable 5000 Unit(s) SubCutaneous every 8 hours  iron sucrose IVPB 200 milliGRAM(s) IV Intermittent every 24 hours  losartan 50 milliGRAM(s) Oral daily  mirtazapine 15 milliGRAM(s) Oral at bedtime  multivitamin 1 Tablet(s) Oral daily  pancrelipase  (CREON 12,000 Lipase Units) 4 Capsule(s) Oral three times a day with meals  piperacillin/tazobactam IVPB.. 3.375 Gram(s) IV Intermittent every 8 hours  sertraline 25 milliGRAM(s) Oral daily    MEDICATIONS  (PRN):  acetaminophen     Tablet .. 650 milliGRAM(s) Oral every 6 hours PRN Temp greater or equal to 38C (100.4F), Mild Pain (1 - 3)  aluminum hydroxide/magnesium hydroxide/simethicone Suspension 30 milliLiter(s) Oral every 4 hours PRN Dyspepsia  melatonin 3 milliGRAM(s) Oral at bedtime PRN Insomnia  ondansetron Injectable 4 milliGRAM(s) IV Push every 8 hours PRN Nausea and/or Vomiting  QUEtiapine 25 milliGRAM(s) Oral two times a day PRN agitation

## 2024-04-09 NOTE — PROGRESS NOTE ADULT - SUBJECTIVE AND OBJECTIVE BOX
Patient is a 89y old  Male who presents with a chief complaint of Diarrhea/Gall Stone/Dilated Pancreatic Duct (09 Apr 2024 18:33)    Follow up for: diarrhea, abnormal imaging    Pt asleep but arousable. No acute complaints. Diarrhea much improved.       MEDICATIONS  (STANDING):  cyanocobalamin 1000 MICROGram(s) Oral daily  gabapentin 600 milliGRAM(s) Oral at bedtime  heparin   Injectable 5000 Unit(s) SubCutaneous every 8 hours  iron sucrose IVPB 200 milliGRAM(s) IV Intermittent every 24 hours  losartan 50 milliGRAM(s) Oral daily  mirtazapine 15 milliGRAM(s) Oral at bedtime  multivitamin 1 Tablet(s) Oral daily  pancrelipase  (CREON 12,000 Lipase Units) 4 Capsule(s) Oral three times a day with meals  sertraline 25 milliGRAM(s) Oral daily    MEDICATIONS  (PRN):  acetaminophen     Tablet .. 650 milliGRAM(s) Oral every 6 hours PRN Temp greater or equal to 38C (100.4F), Mild Pain (1 - 3)  aluminum hydroxide/magnesium hydroxide/simethicone Suspension 30 milliLiter(s) Oral every 4 hours PRN Dyspepsia  melatonin 3 milliGRAM(s) Oral at bedtime PRN Insomnia  ondansetron Injectable 4 milliGRAM(s) IV Push every 8 hours PRN Nausea and/or Vomiting  QUEtiapine 25 milliGRAM(s) Oral two times a day PRN agitation      Vital Signs Last 24 Hrs  T(C): 37.1 (09 Apr 2024 21:39), Max: 37.1 (09 Apr 2024 21:39)  T(F): 98.7 (09 Apr 2024 21:39), Max: 98.7 (09 Apr 2024 21:39)  HR: 66 (09 Apr 2024 21:39) (52 - 77)  BP: 159/68 (09 Apr 2024 21:39) (108/58 - 160/73)  BP(mean): --  RR: 16 (09 Apr 2024 21:39) (16 - 18)  SpO2: 98% (09 Apr 2024 21:39) (96% - 98%)    Parameters below as of 09 Apr 2024 21:39  Patient On (Oxygen Delivery Method): room air        PHYSICAL EXAM:    Constitutional: No acute distress, elderly, sitting in chair alseep but arousable non-toxic appearing  HEENT: unmasked, good phonation, not icteric  Neck: supple, no lymphadenopathy  Respiratory: unlarbored  Gastrointestinal: soft, non-tender, non-distended  Extremities: No peripheral edema, no cyanosis or clubbing  Vascular: 2+ peripheral pulses, no venous stasis  Neurological: A/O x 1, no focal deficits, no asterixis  Psychiatric: normal mood and affect  Skin: No rashes, not jaundiced    LABS:                        9.9    x     )-----------( x        ( 09 Apr 2024 06:49 )             29.1     04-09    147<H>  |  120<H>  |  18  ----------------------------<  104<H>  3.3<L>   |  24  |  1.08    Ca    8.3<L>      09 Apr 2024 06:49  Phos  2.4     04-09  Mg     1.8     04-09    TPro  5.8<L>  /  Alb  2.6<L>  /  TBili  1.2  /  DBili  x   /  AST  28  /  ALT  14  /  AlkPhos  62  04-08      LIVER FUNCTIONS - ( 08 Apr 2024 06:46 )  Alb: 2.6 g/dL / Pro: 5.8 gm/dL / ALK PHOS: 62 U/L / ALT: 14 U/L / AST: 28 U/L / GGT: x             RADIOLOGY & ADDITIONAL STUDIES: MRCP with dilated pd to 8 mm, atrophy, panc cyst

## 2024-04-09 NOTE — GOALS OF CARE CONVERSATION - ADVANCED CARE PLANNING - CONVERSATION DETAILS
HPI:  89M with PMH of HTN, Dementia, Depression, BPH presents with diarrhea. Patient AAOx1 at baseline on admission.     PERTINENT PMH REVIEWED:  [ X ] YES [ ] NO           Mental Status: [  ] Alert  [  ] Oriented [  ] Confused [ X ] Lethargic [  ]  Concerns of Depression [  ]- History of  Anxiety [   ]- Unable to assess due to confusion, not identified by family   Baseline ADLs (prior to admission):  Independent [ ] moderately [ ] fully   Dependent   [ X ] moderately [ ] fully    Anticipated Grief: Patient [  ] Family [ X ]    Caregiver Eustace Assessed: Yes [ X ] No [  ]    Amish: Unknown     Spiritual Concerns: Not identified     Goals of Care: Return home and continue with current management     Previous Services: Private aide through [employer] insurance at night     ADVANCE DIRECTIVES:  DNR/DNI     Anticipated D/C Plan: Return home with home PT                      Summary:    This SW spoke with pts wife via phone to discuss goals of care, assist with planning and provide supportive counseling. Pt. was residing home with his wife and son has been recently there as well. Pts wife reports they  have a aide through [employer] insurance that stays with pt. at night. Pt. also goes to Adult Day Care program 4 days a week. Pts wife shared that pts mobility is declining and he has been working with PT at home. She shared that pts dementia has progressed over the last year and she has noticed a decline. Feelings explored and support provided.     Goals of care discussed. Pts wife shared that she does nto want pt. to go to [facility] and would like to take pt. back home with home Pt and previous aide service. She discussed how they are waiting to speak with GI Dr. Camarena as she reports she was told a MRI showed something on pts Pancreas. We discussed option of hospice and comfort focus if pt. declines further or they are ready at any point to strictly focus on is comfort. Hospice services and philosophy of care explained in detail. Pts wife was receptive to the information. She shared that currently they are not ready for a comfort focus or hospice however, she is aware that if pt. continues to decline they can switch to a comfort focus and Hospice at any point. Pts wife confirmed DNR/DNI.

## 2024-04-09 NOTE — PROGRESS NOTE ADULT - ASSESSMENT
89 year old man with dementia, admitted with diarrhea/failure to thrive, found to have panc cyst with dilated PD to 8 mm, no clear mass.     Pancreatic atrophy, cyst and dilation could be chronic panc. Could have mixed/main duct IPMN. Could have stricture/mass, although CA 19.9 normal. Had long discussion with family (wife and son). They would not want any aggressive treatments, so we decided that aggressive pursuit of his diarrhea and abnormal imaging findings with EUS or ERCP, or egd/colonoscopy. We agreed that just treatments for his symptoms are our goals.     Of note, patient improved his stooling either with time or with panc enzymes. Elastase not sent but improved with enzymes so just continue for now.     If still ok without diarrhea and other intervention needed, ok to be discharged from a GI perspective.

## 2024-04-10 LAB
ADD ON TEST-SPECIMEN IN LAB: SIGNIFICANT CHANGE UP
ANION GAP SERPL CALC-SCNC: 3 MMOL/L — LOW (ref 5–17)
ANION GAP SERPL CALC-SCNC: 4 MMOL/L — LOW (ref 5–17)
BUN SERPL-MCNC: 19 MG/DL — SIGNIFICANT CHANGE UP (ref 7–23)
BUN SERPL-MCNC: 22 MG/DL — SIGNIFICANT CHANGE UP (ref 7–23)
CALCIUM SERPL-MCNC: 8.4 MG/DL — LOW (ref 8.5–10.1)
CALCIUM SERPL-MCNC: 8.5 MG/DL — SIGNIFICANT CHANGE UP (ref 8.5–10.1)
CHLORIDE SERPL-SCNC: 116 MMOL/L — HIGH (ref 96–108)
CHLORIDE SERPL-SCNC: 119 MMOL/L — HIGH (ref 96–108)
CO2 SERPL-SCNC: 23 MMOL/L — SIGNIFICANT CHANGE UP (ref 22–31)
CO2 SERPL-SCNC: 25 MMOL/L — SIGNIFICANT CHANGE UP (ref 22–31)
CREAT SERPL-MCNC: 1.06 MG/DL — SIGNIFICANT CHANGE UP (ref 0.5–1.3)
CREAT SERPL-MCNC: 1.12 MG/DL — SIGNIFICANT CHANGE UP (ref 0.5–1.3)
EGFR: 62 ML/MIN/1.73M2 — SIGNIFICANT CHANGE UP
EGFR: 66 ML/MIN/1.73M2 — SIGNIFICANT CHANGE UP
GLUCOSE SERPL-MCNC: 101 MG/DL — HIGH (ref 70–99)
GLUCOSE SERPL-MCNC: 167 MG/DL — HIGH (ref 70–99)
HCT VFR BLD CALC: 30.9 % — LOW (ref 39–50)
HGB BLD-MCNC: 10.3 G/DL — LOW (ref 13–17)
PHOSPHATE SERPL-MCNC: 2.2 MG/DL — LOW (ref 2.5–4.5)
PHOSPHATE SERPL-MCNC: 2.4 MG/DL — LOW (ref 2.5–4.5)
PLATELET # BLD AUTO: 174 K/UL — SIGNIFICANT CHANGE UP (ref 150–400)
POTASSIUM SERPL-MCNC: 3.8 MMOL/L — SIGNIFICANT CHANGE UP (ref 3.5–5.3)
POTASSIUM SERPL-MCNC: 3.9 MMOL/L — SIGNIFICANT CHANGE UP (ref 3.5–5.3)
POTASSIUM SERPL-SCNC: 3.8 MMOL/L — SIGNIFICANT CHANGE UP (ref 3.5–5.3)
POTASSIUM SERPL-SCNC: 3.9 MMOL/L — SIGNIFICANT CHANGE UP (ref 3.5–5.3)
SODIUM SERPL-SCNC: 143 MMOL/L — SIGNIFICANT CHANGE UP (ref 135–145)
SODIUM SERPL-SCNC: 147 MMOL/L — HIGH (ref 135–145)

## 2024-04-10 PROCEDURE — 99232 SBSQ HOSP IP/OBS MODERATE 35: CPT

## 2024-04-10 RX ORDER — SODIUM,POTASSIUM PHOSPHATES 278-250MG
1 POWDER IN PACKET (EA) ORAL
Refills: 0 | Status: COMPLETED | OUTPATIENT
Start: 2024-04-10 | End: 2024-04-11

## 2024-04-10 RX ORDER — SODIUM CHLORIDE 9 MG/ML
1000 INJECTION, SOLUTION INTRAVENOUS
Refills: 0 | Status: COMPLETED | OUTPATIENT
Start: 2024-04-10 | End: 2024-04-10

## 2024-04-10 RX ADMIN — PREGABALIN 1000 MICROGRAM(S): 225 CAPSULE ORAL at 10:03

## 2024-04-10 RX ADMIN — Medication 4 CAPSULE(S): at 10:01

## 2024-04-10 RX ADMIN — Medication 1 PACKET(S): at 23:12

## 2024-04-10 RX ADMIN — GABAPENTIN 600 MILLIGRAM(S): 400 CAPSULE ORAL at 22:11

## 2024-04-10 RX ADMIN — HEPARIN SODIUM 5000 UNIT(S): 5000 INJECTION INTRAVENOUS; SUBCUTANEOUS at 22:11

## 2024-04-10 RX ADMIN — SERTRALINE 25 MILLIGRAM(S): 25 TABLET, FILM COATED ORAL at 10:03

## 2024-04-10 RX ADMIN — HEPARIN SODIUM 5000 UNIT(S): 5000 INJECTION INTRAVENOUS; SUBCUTANEOUS at 13:05

## 2024-04-10 RX ADMIN — HEPARIN SODIUM 5000 UNIT(S): 5000 INJECTION INTRAVENOUS; SUBCUTANEOUS at 05:18

## 2024-04-10 RX ADMIN — Medication 1 TABLET(S): at 10:03

## 2024-04-10 RX ADMIN — IRON SUCROSE 110 MILLIGRAM(S): 20 INJECTION, SOLUTION INTRAVENOUS at 05:17

## 2024-04-10 RX ADMIN — Medication 1 PACKET(S): at 13:05

## 2024-04-10 RX ADMIN — Medication 4 CAPSULE(S): at 13:06

## 2024-04-10 RX ADMIN — SODIUM CHLORIDE 150 MILLILITER(S): 9 INJECTION, SOLUTION INTRAVENOUS at 10:03

## 2024-04-10 RX ADMIN — MIRTAZAPINE 15 MILLIGRAM(S): 45 TABLET, ORALLY DISINTEGRATING ORAL at 22:11

## 2024-04-10 RX ADMIN — Medication 3 MILLIGRAM(S): at 22:12

## 2024-04-10 RX ADMIN — Medication 1 PACKET(S): at 17:47

## 2024-04-10 RX ADMIN — Medication 1 PACKET(S): at 10:01

## 2024-04-10 RX ADMIN — Medication 4 CAPSULE(S): at 17:46

## 2024-04-10 RX ADMIN — LOSARTAN POTASSIUM 50 MILLIGRAM(S): 100 TABLET, FILM COATED ORAL at 10:03

## 2024-04-10 NOTE — PROGRESS NOTE ADULT - NUTRITIONAL ASSESSMENT
This patient has been assessed with a concern for Malnutrition and has been determined to have a diagnosis/diagnoses of Moderate protein-calorie malnutrition.    This patient is being managed with:   Diet DASH/TLC-  Sodium & Cholesterol Restricted  Entered: Apr 7 2024  3:26PM  

## 2024-04-10 NOTE — PROGRESS NOTE ADULT - ASSESSMENT
A/P    Cholelithiasis without Acute Cholecystitis  Dilated Pancreatic Duct with possible pancreatic Atrophy vs malignancy. Unable to clinically determine at this time  Diarrhea likely from pancreatic insufficiency  Associated Hypernatremia  -Med/surg  -IVF as needed for fluid balance  -Continue to monitor Cr/Electrolytes and correct electrolytes accordingly  -Surgery consult/recs  -MRCP findings as above. Surgery/GI recs  -Empiric Antibiotics. No Sepsis POA. ->Culture negative. No acute lexa->discontinue antibotics.   -Pain control/Bowel regimen. Diarrhea improved with initiation of creon. suggestive of underlying pancreatic insufficiency.     HTN  -Home losartan. Titrate Rx accordingly.       Macrocytic Anemia  -No overt signs of bleeding  -Continue to monitor  -Ferritin/Iron/B12/Folate  -Iron low->venofer  -B12 low normal-> Injec+Oral supplementation    Dementia  Depression  -Continue Remeron/Sertraline  -Continue seroquel PRN    DVT Prophylaxis: heparin subq

## 2024-04-10 NOTE — PROGRESS NOTE ADULT - SUBJECTIVE AND OBJECTIVE BOX
CHIEF COMPLAINT: Diarrhea    SUBJECTIVE/SIGNIFICANT INTERVAL EVENTS/OVERNIGHT EVENTS:    4/8: Diarrhea near resolved. MRCP findings noted. No clear neoplasm but could be suggestive of one. No acute lexa. patient denies pain, nasuea, vomiting. Limited ROS/CC in the setting of baseline dementia.     4/9: Clinically stable. Na slightly higher. Continue D5. MRCP findings discussed with son. Discussed with GI->conservative management. Family requesting Dr. Alfaro to evaluate findings. Discussed with soniya and lena and agreeable. Pending further recs. No more diarrhea. Creon appears to be effective.     4/10: Clinically stable. Hypernatremia improved with D5. Discharge planning->home tomorrow when aids present.     Review of Systems: 14 Point review of systems reviewed and reported as negative unless otherwise stated above    FROM H&P:  "89M with PMH of HTN, Dementia, Depression, BPH presents with diarrhea. Patient AAOx1 at baseline on admission. Information obtained from report and discussion for patient's son Guero. Reported approximately 3-4 episodes of diarrhea in the last 1-2 weeks without blood. Some muscous reported. No reported dizziness, chest pain, SOB, nausea, vomiting, abdominal pain/discomfort.     In the ER, Tmax 98.9, HR 91-88, /63, RR 18, SpO2 100% on RA. Hgb 11.3 (.1), Na 149, BUN 22, Cr 1.12 (Baseline 0.9-1.1), LFTs unremarkable, Lipase WNL, UA non-diagnostic. CT abd/pel with Con showing large 3.9cm Fundal Gall Stone, mild GB wall thickening vs pericholecystic fluid and dilated pancreatic duct up to 12mm with possible pancreatic atrophy.     Limited Surgical, Family, Social history due to dementia. Information documented from prior medical records. "    PHYSICAL EXAM:    T(C): 36.4 (04-10-24 @ 15:20), Max: 37.1 (04-09-24 @ 21:39)  HR: 67 (04-10-24 @ 15:20) (65 - 67)  BP: 131/51 (04-10-24 @ 15:20) (131/51 - 159/68)  RR: 18 (04-10-24 @ 15:20) (16 - 18)  SpO2: 96% (04-10-24 @ 15:20) (94% - 98%)  General: AAOx1; NAD  Head: AT/NC  ENT: Moist Mucous Membranes; No Injury  Eyes: EOMI; PERRL; 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   Neck: Non-tender; No JVD  CVS: RRR, S1&S2, No murmur, No edema  Respiratory: Lungs CTA B/L; Normal Respiratory Effort  Abdomen/GI: Soft, non-tender, non-distended, no guarding, no rebound, normal bowel sounds  : No bladder distention, No Li  Extremities: No cyanosis, No clubbing, No edema  MSK: No CVA tenderness, Normal ROM, No injury  Neuro: AAOx1, CNII-XII grossly intact, non-focal  Psych: Appropriate, Cooperative,   Skin: Clean, Dry and Intact      LABS:                          10.3   x     )-----------( 174      ( 10 Apr 2024 05:53 )             30.9     04-10    143  |  116<H>  |  19  ----------------------------<  167<H>  3.8   |  23  |  1.06    Ca    8.5      10 Apr 2024 14:24  Phos  2.2     04-10  Mg     1.8     04-09      SARS-CoV-2: NotDetec (11 Nov 2023 16:13)    CAPILLARY BLOOD GLUCOSE                                10.1   7.07  )-----------( 144      ( 08 Apr 2024 06:46 )             30.2     04-08    145  |  117<H>  |  19  ----------------------------<  92  3.6   |  23  |  1.05    Ca    8.2<L>      08 Apr 2024 06:46  Phos  2.5     04-08  Mg     1.9     04-08    TPro  5.8<L>  /  Alb  2.6<L>  /  TBili  1.2  /  DBili  x   /  AST  28  /  ALT  14  /  AlkPhos  62  04-08    SARS-CoV-2: NotDetec (11 Nov 2023 16:13)    CAPILLARY BLOOD GLUCOSE          Culture - Blood (collected 07 Apr 2024 05:02)  Source: .Blood None  Preliminary Report (08 Apr 2024 11:02):    No growth at 24 hours    Culture - Blood (collected 07 Apr 2024 05:02)  Source: .Blood None  Preliminary Report (08 Apr 2024 11:02):    No growth at 24 hours    Culture - Urine (collected 07 Apr 2024 01:24)  Source: Clean Catch None  Final Report (08 Apr 2024 16:26):    No growth          RADIOLOGY:  < from: MR MRCP No Cont (04.07.24 @ 13:40) >  IMPRESSION:  Dilated and irregular pancreatic duct with ectatic branch ducts and   associated parenchymal atrophy. Findings raise the possibility of   intraductal papillary mucinous neoplasm with main duct involvement.   Chronic pancreatitis is also in the differential diagnosis.    Large gallstone without evidence of acute cholecystitis.    No biliary ductal dilatation choledocholithiasis.    Moderate L2 compression deformity whichis likely acute or subacute.    < end of copied text >  < from: CT Abdomen and Pelvis w/ IV Cont (04.07.24 @ 02:54) >  IMPRESSION:    Large 3.9 cm fundal gallbladder stone. The gallbladder mucosa shows   prominent enhancement and there is mild gallbladder wall thickening   versus pericholecystic fluid at the fundus.Correlate clinically for   gallbladder disease.    Height loss at the superior endplate of the L2 vertebral body is age   indeterminant but new since the previous exam of 7/12/2023. Correlate   clinically for pain at this site.    Markedly dilated pancreatic duct measuring up to 12 mm with pancreatic   atrophy. Possible is not excluded. This is not well evaluated on this   examination. Recommend contrast-enhanced MRI abdomen with MRCP for   further evaluation.    < end of copied text >            I personally reviewed labs, imaging, ekg, orders and vitals.    Discussed case with:  [X]RN  [X]CM/SW  [X]Patient  [X]Family  [X]Specialist: GI        MEDICATIONS  (STANDING):  cyanocobalamin 1000 MICROGram(s) Oral daily  dextrose 5%. 1000 milliLiter(s) (50 mL/Hr) IV Continuous <Continuous>  gabapentin 600 milliGRAM(s) Oral at bedtime  heparin   Injectable 5000 Unit(s) SubCutaneous every 8 hours  iron sucrose IVPB 200 milliGRAM(s) IV Intermittent every 24 hours  losartan 50 milliGRAM(s) Oral daily  mirtazapine 15 milliGRAM(s) Oral at bedtime  multivitamin 1 Tablet(s) Oral daily  pancrelipase  (CREON 12,000 Lipase Units) 4 Capsule(s) Oral three times a day with meals  piperacillin/tazobactam IVPB.. 3.375 Gram(s) IV Intermittent every 8 hours  sertraline 25 milliGRAM(s) Oral daily    MEDICATIONS  (PRN):  acetaminophen     Tablet .. 650 milliGRAM(s) Oral every 6 hours PRN Temp greater or equal to 38C (100.4F), Mild Pain (1 - 3)  aluminum hydroxide/magnesium hydroxide/simethicone Suspension 30 milliLiter(s) Oral every 4 hours PRN Dyspepsia  melatonin 3 milliGRAM(s) Oral at bedtime PRN Insomnia  ondansetron Injectable 4 milliGRAM(s) IV Push every 8 hours PRN Nausea and/or Vomiting  QUEtiapine 25 milliGRAM(s) Oral two times a day PRN agitation

## 2024-04-10 NOTE — PROGRESS NOTE ADULT - REASON FOR ADMISSION
Diarrhea/Gall Stone/Dilated Pancreatic Duct

## 2024-04-11 ENCOUNTER — TRANSCRIPTION ENCOUNTER (OUTPATIENT)
Age: 89
End: 2024-04-11

## 2024-04-11 VITALS
SYSTOLIC BLOOD PRESSURE: 169 MMHG | RESPIRATION RATE: 18 BRPM | OXYGEN SATURATION: 100 % | DIASTOLIC BLOOD PRESSURE: 69 MMHG | HEART RATE: 57 BPM | TEMPERATURE: 98 F

## 2024-04-11 PROCEDURE — 99239 HOSP IP/OBS DSCHRG MGMT >30: CPT

## 2024-04-11 RX ORDER — PREGABALIN 225 MG/1
1 CAPSULE ORAL
Qty: 30 | Refills: 0
Start: 2024-04-11 | End: 2024-05-10

## 2024-04-11 RX ORDER — LIPASE/PROTEASE/AMYLASE 16-48-48K
4 CAPSULE,DELAYED RELEASE (ENTERIC COATED) ORAL
Qty: 360 | Refills: 0
Start: 2024-04-11 | End: 2024-05-10

## 2024-04-11 RX ADMIN — Medication 1 PACKET(S): at 06:42

## 2024-04-11 RX ADMIN — Medication 1 TABLET(S): at 09:31

## 2024-04-11 RX ADMIN — SERTRALINE 25 MILLIGRAM(S): 25 TABLET, FILM COATED ORAL at 09:31

## 2024-04-11 RX ADMIN — Medication 4 CAPSULE(S): at 08:51

## 2024-04-11 RX ADMIN — LOSARTAN POTASSIUM 50 MILLIGRAM(S): 100 TABLET, FILM COATED ORAL at 09:31

## 2024-04-11 RX ADMIN — HEPARIN SODIUM 5000 UNIT(S): 5000 INJECTION INTRAVENOUS; SUBCUTANEOUS at 06:42

## 2024-04-11 RX ADMIN — PREGABALIN 1000 MICROGRAM(S): 225 CAPSULE ORAL at 09:31

## 2024-04-11 RX ADMIN — Medication 4 CAPSULE(S): at 13:04

## 2024-04-11 RX ADMIN — HEPARIN SODIUM 5000 UNIT(S): 5000 INJECTION INTRAVENOUS; SUBCUTANEOUS at 14:55

## 2024-04-11 NOTE — DISCHARGE NOTE PROVIDER - CARE PROVIDERS DIRECT ADDRESSES
[email or web address]

## 2024-04-11 NOTE — DISCHARGE NOTE PROVIDER - NSDCACTIVITY_GEN_ALL_CORE
Ambulation/Activity as tolerated with assistance/assistive device(s)/Do not drive or operate machinery/Do not make important decisions

## 2024-04-11 NOTE — DISCHARGE NOTE PROVIDER - CARE PROVIDER_API CALL
Tashi Akbar  Internal Medicine  [address], [address]  [place] [address]  Phone: [phone]  Fax: [phone]  Established Patient  Follow Up Time: 1 week    Jus Alfaro  Gastroenterology  [address] [place] [address], [facility] [place] [address]  Phone: [phone]  Fax: [phone]  Established Patient  Follow Up Time: Routine

## 2024-04-11 NOTE — DISCHARGE NOTE PROVIDER - NSDCCPCAREPLAN_GEN_ALL_CORE_FT
PRINCIPAL DISCHARGE DIAGNOSIS  Diagnosis: Diarrhea  Assessment and Plan of Treatment: Suspected to be from pancreatic insufficiency which is the organ that creates digestive enxzymes to help digest good. Supplemental enzymes started with improvement in symptoms. Continue to take medication as prescribed. In addition, imaging was suggestive of possible underlying malignancy located in the pancreas result in dilation of the ducts ("piping") of the biliar and pancreatic system. Clinically stable and further evaluation and testing and procedures for the possible malignancy has been deferred. Follow up with your primary medical doctor and gastroenterologist.      SECONDARY DISCHARGE DIAGNOSES  Diagnosis: Gallstone  Assessment and Plan of Treatment: No signs of acute cholecystitis. Follow up with your primary medical doctor and gastroenterologist    Diagnosis: Hypernatremia  Assessment and Plan of Treatment: Likely from inadequate oral hydration. Continue to maintain oral hydration and follow up with your primary medical doctor.     PRINCIPAL DISCHARGE DIAGNOSIS  Diagnosis: Diarrhea  Assessment and Plan of Treatment: Suspected to be from pancreatic insufficiency which is the organ that creates digestive enxzymes to help digest good. Supplemental enzymes started with improvement in symptoms. Continue to take medication as prescribed. In addition, imaging was suggestive of possible underlying malignancy located in the pancreas result in dilation of the ducts ("piping") of the biliar and pancreatic system. Clinically stable and further evaluation and testing and procedures for the possible malignancy has been deferred. Follow up with your primary medical doctor and gastroenterologist.      SECONDARY DISCHARGE DIAGNOSES  Diagnosis: Gallstone  Assessment and Plan of Treatment: No signs of acute cholecystitis. Follow up with your primary medical doctor and gastroenterologist    Diagnosis: Hypernatremia  Assessment and Plan of Treatment: Likely from inadequate oral hydration. Continue to maintain oral hydration and follow up with your primary medical doctor.    Diagnosis: Iron deficiency anemia  Assessment and Plan of Treatment: Hemoglobin is the molecule in our blood that carries oxygen. A low Hemoglobin (Hgb for short) is termed anemia. Multiple causes of anemia:  1)Blood loss can cause anemia. Red blood cells "dye off" every 3-4 months as well. No signs of blood loss and your hemoglobin level remained stable during the hospitalizatioin  2)Production. Produced in the bone marrow. Three main "building blocks" to make hemoglobin include iron, Vitamin B12 and Folate. Deficiencies in any of these can lead to decreased production. Your iron level was low. You received some IV Iron supplementation replacement. Can start taking oral iron however side effects are GI upset, constipation which is a lot of times not well tolerated. Your vitamin B12 level was "low-normal" and your received a vitamin B12 injection and to continue oral vitamin b12 supplementation. Get your levels checked intermittently outpatient with your primary medical doctor to assess whether further supplementation is required.   3)Autoimmune process or physical destruction of blood cells (your blood does NOT indicate that this is suspected in you)

## 2024-04-11 NOTE — DISCHARGE NOTE PROVIDER - DETAILS OF MALNUTRITION DIAGNOSIS/DIAGNOSES
This patient has been assessed with a concern for Malnutrition and was treated during this hospitalization for the following Nutrition diagnosis/diagnoses:     -  04/08/2024: Moderate protein-calorie malnutrition

## 2024-04-11 NOTE — DISCHARGE NOTE PROVIDER - HOSPITAL COURSE
FROM H&P:    "89M with PMH of HTN, Dementia, Depression, BPH presents with diarrhea. Patient AAOx1 at baseline on admission. Information obtained from report and discussion for patient's son Guero. Reported approximately 3-4 episodes of diarrhea in the last 1-2 weeks without blood. Some muscous reported. No reported dizziness, chest pain, SOB, nausea, vomiting, abdominal pain/discomfort.     In the ER, Tmax 98.9, HR 91-88, /63, RR 18, SpO2 100% on RA. Hgb 11.3 (.1), Na 149, BUN 22, Cr 1.12 (Baseline 0.9-1.1), LFTs unremarkable, Lipase WNL, UA non-diagnostic. CT abd/pel with Con showing large 3.9cm Fundal Gall Stone, mild GB wall thickening vs pericholecystic fluid and dilated pancreatic duct up to 12mm with possible pancreatic atrophy.     Limited Surgical, Family, Social history due to dementia. Information documented from prior medical records. "    Cholelithiasis without Acute Cholecystitis  Dilated Pancreatic Duct with possible pancreatic Atrophy vs malignancy. Unable to clinically determine at this time  Diarrhea likely from pancreatic insufficiency  Associated Hypernatremia  -Med/surg  -IVF as needed for fluid balance  -Continue to monitor Cr/Electrolytes and correct electrolytes accordingly  -Surgery consult/recs  -MRCP findings as above. Surgery/GI recs  -Empiric Antibiotics. No Sepsis POA. ->Culture negative. No acute lexa->discontinue antibotics.   -Pain control/Bowel regimen. Diarrhea improved with initiation of creon. suggestive of underlying pancreatic insufficiency.     HTN  -Home losartan. Titrate Rx accordingly.       Macrocytic Anemia  -No overt signs of bleeding  -Continue to monitor  -Ferritin/Iron/B12/Folate  -Iron low->venofer  -B12 low normal-> Injec+Oral supplementation    Dementia  Depression  -Continue Remeron/Sertraline  -Continue seroquel PRN    Possible malignancy work up deferred (CEA mildly elevated at 5.5 and CA 19-9 WNL). Diarrhea improved. Labs and vitals stable. PT evaluation->YARELI rec. Family elected to retru n to home with Home PT and resumption of home aides. Discharge home in stable condition and close outpatient follow up.    T(C): 36.8 (04-11-24 @ 08:05), Max: 37.1 (04-10-24 @ 22:05)  HR: 57 (04-11-24 @ 08:05) (57 - 82)  BP: 169/69 (04-11-24 @ 08:05) (131/51 - 169/69)  RR: 18 (04-11-24 @ 08:05) (18 - 18)  SpO2: 100% (04-11-24 @ 08:05) (96% - 100%)  General: AAOx1; NAD  Head: AT/NC  ENT: Moist Mucous Membranes; No Injury  Eyes: EOMI; PERRL; 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   Neck: Non-tender; No JVD  CVS: RRR, S1&S2, No murmur, No edema  Respiratory: Lungs CTA B/L; Normal Respiratory Effort  Abdomen/GI: Soft, non-tender, non-distended, no guarding, no rebound, normal bowel sounds  : No bladder distention, No Li  Extremities: No cyanosis, No clubbing, No edema  MSK: No CVA tenderness, Normal ROM, No injury  Neuro: AAOx1, CNII-XII grossly intact, non-focal  Psych: Appropriate, Cooperative,   Skin: Clean, Dry and Intact  Discharge Management: 37 minutes  Date of Discharge/Service: 4/11/2024

## 2024-04-11 NOTE — DISCHARGE NOTE PROVIDER - NSDCCAREPROVSEEN_GEN_ALL_CORE_FT
[place], Zach Barth, Kimberly Hernández, Tessie Obrien, Pili Montgomery, Elisha El, Andres Reddy, Latoya Ferraro, Alvarado Martinez, Joleen Rangel, Hillary Andrade, Tana Fulton, Jorge Slater, Mello

## 2024-04-11 NOTE — DISCHARGE NOTE PROVIDER - PROVIDER TOKENS
PROVIDER:[TOKEN:[[id number]],FOLLOWUP:[1 week],ESTABLISHEDPATIENT:[T]],PROVIDER:[TOKEN:[[id number]],FOLLOWUP:[Routine],ESTABLISHEDPATIENT:[T]]

## 2024-04-11 NOTE — DISCHARGE NOTE PROVIDER - NSDCMRMEDTOKEN_GEN_ALL_CORE_FT
cyanocobalamin 1000 mcg oral tablet: 1 tab(s) orally once a day  gabapentin 600 mg oral tablet: 1 tab(s) orally once a day (at bedtime)  losartan 50 mg oral tablet: 1 tab(s) orally once a day  mirtazapine 15 mg oral tablet: 1 tab(s) orally once a day (at bedtime)  Multiple Vitamins oral tablet: 1 tab(s) orally once a day  pancrelipase 12,000 units-38,000 units-60,000 units oral delayed release capsule: 4 cap(s) orally 3 times a day (with meals)  QUEtiapine 25 mg oral tablet: 1 tab(s) orally once a day (at bedtime), As Needed -for agitation   sertraline 25 mg oral tablet: 1 tab(s) orally once a day

## 2024-04-16 DIAGNOSIS — E87.0 HYPEROSMOLALITY AND HYPERNATREMIA: ICD-10-CM

## 2024-04-16 DIAGNOSIS — E78.5 HYPERLIPIDEMIA, UNSPECIFIED: ICD-10-CM

## 2024-04-16 DIAGNOSIS — E44.0 MODERATE PROTEIN-CALORIE MALNUTRITION: ICD-10-CM

## 2024-04-16 DIAGNOSIS — R62.7 ADULT FAILURE TO THRIVE: ICD-10-CM

## 2024-04-16 DIAGNOSIS — D53.9 NUTRITIONAL ANEMIA, UNSPECIFIED: ICD-10-CM

## 2024-04-16 DIAGNOSIS — I10 ESSENTIAL (PRIMARY) HYPERTENSION: ICD-10-CM

## 2024-04-16 DIAGNOSIS — F03.90 UNSPECIFIED DEMENTIA WITHOUT BEHAVIORAL DISTURBANCE: ICD-10-CM

## 2024-04-16 DIAGNOSIS — Z96.651 PRESENCE OF RIGHT ARTIFICIAL KNEE JOINT: ICD-10-CM

## 2024-04-16 DIAGNOSIS — F41.1 GENERALIZED ANXIETY DISORDER: ICD-10-CM

## 2024-04-16 DIAGNOSIS — N40.0 BENIGN PROSTATIC HYPERPLASIA WITHOUT LOWER URINARY TRACT SYMPTOMS: ICD-10-CM

## 2024-04-16 DIAGNOSIS — K86.89 OTHER SPECIFIED DISEASES OF PANCREAS: ICD-10-CM

## 2024-04-16 DIAGNOSIS — R19.7 DIARRHEA, UNSPECIFIED: ICD-10-CM

## 2024-04-16 DIAGNOSIS — F32.9 MAJOR DEPRESSIVE DISORDER, SINGLE EPISODE, UNSPECIFIED: ICD-10-CM

## 2024-04-16 DIAGNOSIS — G47.00 INSOMNIA, UNSPECIFIED: ICD-10-CM

## 2024-04-16 DIAGNOSIS — K80.50 CALCULUS OF BILE DUCT WITHOUT CHOLANGITIS OR CHOLECYSTITIS WITHOUT OBSTRUCTION: ICD-10-CM

## 2024-04-25 NOTE — ED ADULT NURSE NOTE - NSFALLASSISTNEEDED_ED_ALL_ED
Photo Preface (Leave Blank If You Do Not Want): Photographs were obtained today Detail Level: Zone Standing/Walking/Toileting/Moving from bed to stretcher

## 2024-05-15 DIAGNOSIS — K86.1 OTHER CHRONIC PANCREATITIS: ICD-10-CM

## 2024-05-15 RX ORDER — PANCRELIPASE 60000; 12000; 38000 [USP'U]/1; [USP'U]/1; [USP'U]/1
12000-38000 CAPSULE, DELAYED RELEASE PELLETS ORAL 3 TIMES DAILY
Qty: 360 | Refills: 3 | Status: ACTIVE | COMMUNITY
Start: 2024-05-15 | End: 1900-01-01

## 2024-06-06 DIAGNOSIS — Z11.1 ENCOUNTER FOR SCREENING FOR RESPIRATORY TUBERCULOSIS: ICD-10-CM

## 2024-06-06 NOTE — ASU PATIENT PROFILE, ADULT - NSALCOHOLFREQ_GEN_A_CORE_SD
Fever in Children    Your child was seen in the Emergency Department for a fever.      A fever is an increase in the body's temperature. It is usually defined as a temperature of 100.4°F (38°C) or higher. In children older than 3 months, a brief mild or moderate fever generally has no long-term effect, and it usually does not need treatment. In children younger than 3 months, a fever may indicate a serious problem.  The sweating that may occur with repeated or prolonged fever may also cause mild dehydration.    Fever is typically caused by infection.  Your health care provider may have tested your child during your Emergency Department visit to identify the cause of the fever.  Most fevers in children are caused by viruses and blood tests are not routinely required.    General tips for managing fevers at home:  -Give over-the-counter and prescription medicines only as told by your child's health care provider. Carefully follow dosing instructions.   -Watch your child's condition for any changes. Let your child's health care provider know about them.   -Have your child rest as needed.   -Have your child drink enough fluid to keep his or her urine clear to pale yellow. This helps to prevent dehydration.   -Sponge or bathe your child with room-temperature water to help reduce body temperature as needed. Do not use cold water, and do not do this if it makes your child more fussy or uncomfortable.   -If your child's fever is caused by an infection that spreads from person to person (is contagious), such as a cold or the flu, he or she should stay home. He or she may leave the house only to get medical care if needed. The child should not return to school or  until at least 24 hours after the fever is gone. The fever should be gone without the use of medicines.     Follow-up with your pediatrician in 1-2 days to make sure that your child is doing better.    Return to the Emergency Department if your child:  -Becomes limp or floppy, or is not responding to you.  -Has fever more than 7-10 days, or fever more than 5 days if with rash, cracked lips, or pink eyes.   -Has wheezing or shortness of breath.   -Has a febrile seizure.   -Is dizzy or faints.   -Will not drink.   -Develops any of the following:   ·         A rash, a stiff neck, or a severe headache.   ·         Severe pain in the abdomen.   ·         Persistent or severe vomiting or diarrhea.   ·         A severe or productive cough.  -Is one year old or younger, and you notice signs of dehydration. These may include:   ·         A sunken soft spot (fontanel) on his or her head.   ·         No wet diapers in 6 hours.   ·         Increased fussiness.  -Is one year old or older, and you notice signs of dehydration. These may include:   ·         No urine in 8–12 hours.   ·         Cracked lips.   ·         Not making tears while crying.   ·         Dry mouth.   ·         Sunken eyes.   ·         Sleepiness.   ·         Weakness. 2-4 times/mo

## 2024-06-11 LAB
M TB IFN-G BLD-IMP: NEGATIVE
QUANTIFERON TB PLUS MITOGEN MINUS NIL: >10 IU/ML
QUANTIFERON TB PLUS NIL: 0.03 IU/ML
QUANTIFERON TB PLUS TB1 MINUS NIL: 0 IU/ML
QUANTIFERON TB PLUS TB2 MINUS NIL: 0 IU/ML

## 2024-07-16 ENCOUNTER — INPATIENT (INPATIENT)
Facility: HOSPITAL | Age: 89
LOS: 2 days | Discharge: HOME | DRG: 195 | End: 2024-07-19
Attending: HOSPITALIST | Admitting: INTERNAL MEDICINE
Payer: MEDICARE

## 2024-07-16 VITALS
HEART RATE: 82 BPM | WEIGHT: 179.9 LBS | TEMPERATURE: 101 F | SYSTOLIC BLOOD PRESSURE: 182 MMHG | DIASTOLIC BLOOD PRESSURE: 83 MMHG | RESPIRATION RATE: 17 BRPM | OXYGEN SATURATION: 95 %

## 2024-07-16 DIAGNOSIS — J18.9 PNEUMONIA, UNSPECIFIED ORGANISM: ICD-10-CM

## 2024-07-16 DIAGNOSIS — Z90.89 ACQUIRED ABSENCE OF OTHER ORGANS: Chronic | ICD-10-CM

## 2024-07-16 DIAGNOSIS — Z96.651 PRESENCE OF RIGHT ARTIFICIAL KNEE JOINT: Chronic | ICD-10-CM

## 2024-07-16 LAB
ALBUMIN SERPL ELPH-MCNC: 3.4 G/DL — SIGNIFICANT CHANGE UP (ref 3.3–5)
ALP SERPL-CCNC: 85 U/L — SIGNIFICANT CHANGE UP (ref 40–120)
ALT FLD-CCNC: 17 U/L — SIGNIFICANT CHANGE UP (ref 12–78)
ANION GAP SERPL CALC-SCNC: 5 MMOL/L — SIGNIFICANT CHANGE UP (ref 5–17)
APPEARANCE UR: CLEAR — SIGNIFICANT CHANGE UP
APTT BLD: 30.5 SEC — SIGNIFICANT CHANGE UP (ref 24.5–35.6)
AST SERPL-CCNC: 23 U/L — SIGNIFICANT CHANGE UP (ref 15–37)
BACTERIA # UR AUTO: NEGATIVE /HPF — SIGNIFICANT CHANGE UP
BASOPHILS # BLD AUTO: 0.03 K/UL — SIGNIFICANT CHANGE UP (ref 0–0.2)
BASOPHILS NFR BLD AUTO: 0.5 % — SIGNIFICANT CHANGE UP (ref 0–2)
BILIRUB SERPL-MCNC: 0.9 MG/DL — SIGNIFICANT CHANGE UP (ref 0.2–1.2)
BILIRUB UR-MCNC: NEGATIVE — SIGNIFICANT CHANGE UP
BUN SERPL-MCNC: 22 MG/DL — SIGNIFICANT CHANGE UP (ref 7–23)
CALCIUM SERPL-MCNC: 9 MG/DL — SIGNIFICANT CHANGE UP (ref 8.5–10.1)
CHLORIDE SERPL-SCNC: 109 MMOL/L — HIGH (ref 96–108)
CO2 SERPL-SCNC: 27 MMOL/L — SIGNIFICANT CHANGE UP (ref 22–31)
COLOR SPEC: YELLOW — SIGNIFICANT CHANGE UP
CREAT SERPL-MCNC: 1.18 MG/DL — SIGNIFICANT CHANGE UP (ref 0.5–1.3)
DIFF PNL FLD: ABNORMAL
EGFR: 58 ML/MIN/1.73M2 — LOW
EOSINOPHIL # BLD AUTO: 0.06 K/UL — SIGNIFICANT CHANGE UP (ref 0–0.5)
EOSINOPHIL NFR BLD AUTO: 1 % — SIGNIFICANT CHANGE UP (ref 0–6)
FLUAV AG NPH QL: SIGNIFICANT CHANGE UP
FLUBV AG NPH QL: SIGNIFICANT CHANGE UP
GLUCOSE SERPL-MCNC: 104 MG/DL — HIGH (ref 70–99)
GLUCOSE UR QL: NEGATIVE MG/DL — SIGNIFICANT CHANGE UP
HCT VFR BLD CALC: 35.9 % — LOW (ref 39–50)
HGB BLD-MCNC: 12.2 G/DL — LOW (ref 13–17)
IMM GRANULOCYTES NFR BLD AUTO: 0.2 % — SIGNIFICANT CHANGE UP (ref 0–0.9)
INR BLD: 1.03 RATIO — SIGNIFICANT CHANGE UP (ref 0.85–1.18)
KETONES UR-MCNC: NEGATIVE MG/DL — SIGNIFICANT CHANGE UP
LACTATE SERPL-SCNC: 0.8 MMOL/L — SIGNIFICANT CHANGE UP (ref 0.7–2)
LEUKOCYTE ESTERASE UR-ACNC: NEGATIVE — SIGNIFICANT CHANGE UP
LYMPHOCYTES # BLD AUTO: 0.99 K/UL — LOW (ref 1–3.3)
LYMPHOCYTES # BLD AUTO: 16.2 % — SIGNIFICANT CHANGE UP (ref 13–44)
MANUAL SMEAR VERIFICATION: SIGNIFICANT CHANGE UP
MCHC RBC-ENTMCNC: 33.3 PG — SIGNIFICANT CHANGE UP (ref 27–34)
MCHC RBC-ENTMCNC: 34 GM/DL — SIGNIFICANT CHANGE UP (ref 32–36)
MCV RBC AUTO: 98.1 FL — SIGNIFICANT CHANGE UP (ref 80–100)
MONOCYTES # BLD AUTO: 1.06 K/UL — HIGH (ref 0–0.9)
MONOCYTES NFR BLD AUTO: 17.3 % — HIGH (ref 2–14)
NEUTROPHILS # BLD AUTO: 3.97 K/UL — SIGNIFICANT CHANGE UP (ref 1.8–7.4)
NEUTROPHILS NFR BLD AUTO: 64.8 % — SIGNIFICANT CHANGE UP (ref 43–77)
NITRITE UR-MCNC: NEGATIVE — SIGNIFICANT CHANGE UP
PH UR: 7 — SIGNIFICANT CHANGE UP (ref 5–8)
PLAT MORPH BLD: NORMAL — SIGNIFICANT CHANGE UP
PLATELET # BLD AUTO: 159 K/UL — SIGNIFICANT CHANGE UP (ref 150–400)
PLATELET COUNT - ESTIMATE: NORMAL — SIGNIFICANT CHANGE UP
POTASSIUM SERPL-MCNC: 4.5 MMOL/L — SIGNIFICANT CHANGE UP (ref 3.5–5.3)
POTASSIUM SERPL-SCNC: 4.5 MMOL/L — SIGNIFICANT CHANGE UP (ref 3.5–5.3)
PROT SERPL-MCNC: 7.2 GM/DL — SIGNIFICANT CHANGE UP (ref 6–8.3)
PROT UR-MCNC: NEGATIVE MG/DL — SIGNIFICANT CHANGE UP
PROTHROM AB SERPL-ACNC: 11.6 SEC — SIGNIFICANT CHANGE UP (ref 9.5–13)
RBC # BLD: 3.66 M/UL — LOW (ref 4.2–5.8)
RBC # FLD: 12.4 % — SIGNIFICANT CHANGE UP (ref 10.3–14.5)
RBC BLD AUTO: NORMAL — SIGNIFICANT CHANGE UP
RBC CASTS # UR COMP ASSIST: 3 /HPF — SIGNIFICANT CHANGE UP (ref 0–4)
RSV RNA NPH QL NAA+NON-PROBE: SIGNIFICANT CHANGE UP
SARS-COV-2 RNA SPEC QL NAA+PROBE: SIGNIFICANT CHANGE UP
SODIUM SERPL-SCNC: 141 MMOL/L — SIGNIFICANT CHANGE UP (ref 135–145)
SP GR SPEC: 1.01 — SIGNIFICANT CHANGE UP (ref 1–1.03)
SQUAMOUS # UR AUTO: 1 /HPF — SIGNIFICANT CHANGE UP (ref 0–5)
UROBILINOGEN FLD QL: 0.2 MG/DL — SIGNIFICANT CHANGE UP (ref 0.2–1)
WBC # BLD: 6.12 K/UL — SIGNIFICANT CHANGE UP (ref 3.8–10.5)
WBC # FLD AUTO: 6.12 K/UL — SIGNIFICANT CHANGE UP (ref 3.8–10.5)
WBC UR QL: 4 /HPF — SIGNIFICANT CHANGE UP (ref 0–5)

## 2024-07-16 PROCEDURE — 72125 CT NECK SPINE W/O DYE: CPT | Mod: MC

## 2024-07-16 PROCEDURE — 72100 X-RAY EXAM L-S SPINE 2/3 VWS: CPT

## 2024-07-16 PROCEDURE — 84100 ASSAY OF PHOSPHORUS: CPT

## 2024-07-16 PROCEDURE — 80048 BASIC METABOLIC PNL TOTAL CA: CPT

## 2024-07-16 PROCEDURE — 85025 COMPLETE CBC W/AUTO DIFF WBC: CPT

## 2024-07-16 PROCEDURE — 97162 PT EVAL MOD COMPLEX 30 MIN: CPT | Mod: GP

## 2024-07-16 PROCEDURE — 72128 CT CHEST SPINE W/O DYE: CPT | Mod: MC

## 2024-07-16 PROCEDURE — 99285 EMERGENCY DEPT VISIT HI MDM: CPT | Mod: FS

## 2024-07-16 PROCEDURE — 74177 CT ABD & PELVIS W/CONTRAST: CPT | Mod: 26,MC

## 2024-07-16 PROCEDURE — 36415 COLL VENOUS BLD VENIPUNCTURE: CPT

## 2024-07-16 PROCEDURE — 83735 ASSAY OF MAGNESIUM: CPT

## 2024-07-16 PROCEDURE — 85027 COMPLETE CBC AUTOMATED: CPT

## 2024-07-16 PROCEDURE — 80053 COMPREHEN METABOLIC PANEL: CPT

## 2024-07-16 PROCEDURE — 93010 ELECTROCARDIOGRAM REPORT: CPT

## 2024-07-16 PROCEDURE — 70450 CT HEAD/BRAIN W/O DYE: CPT | Mod: 26,MC

## 2024-07-16 PROCEDURE — 71045 X-RAY EXAM CHEST 1 VIEW: CPT | Mod: 26

## 2024-07-16 RX ORDER — CEFTRIAXONE SODIUM 500 MG
1000 VIAL (EA) INJECTION ONCE
Refills: 0 | Status: DISCONTINUED | OUTPATIENT
Start: 2024-07-16 | End: 2024-07-16

## 2024-07-16 RX ORDER — CEFTRIAXONE SODIUM 500 MG
1000 VIAL (EA) INJECTION ONCE
Refills: 0 | Status: COMPLETED | OUTPATIENT
Start: 2024-07-16 | End: 2024-07-16

## 2024-07-16 RX ORDER — ACETAMINOPHEN 325 MG
1000 TABLET ORAL ONCE
Refills: 0 | Status: COMPLETED | OUTPATIENT
Start: 2024-07-16 | End: 2024-07-16

## 2024-07-16 RX ORDER — AZITHROMYCIN 250 MG/1
500 TABLET, FILM COATED ORAL ONCE
Refills: 0 | Status: COMPLETED | OUTPATIENT
Start: 2024-07-16 | End: 2024-07-16

## 2024-07-16 RX ORDER — SODIUM CHLORIDE 0.9 % (FLUSH) 0.9 %
2000 SYRINGE (ML) INJECTION ONCE
Refills: 0 | Status: COMPLETED | OUTPATIENT
Start: 2024-07-16 | End: 2024-07-16

## 2024-07-16 RX ADMIN — Medication 1000 MILLIGRAM(S): at 23:36

## 2024-07-16 RX ADMIN — AZITHROMYCIN 500 MILLIGRAM(S): 250 TABLET, FILM COATED ORAL at 23:36

## 2024-07-16 RX ADMIN — Medication 400 MILLIGRAM(S): at 21:16

## 2024-07-16 RX ADMIN — Medication 2000 MILLILITER(S): at 20:43

## 2024-07-16 NOTE — ED ADULT NURSE NOTE - NSFALLHARMRISKINTERV_ED_ALL_ED

## 2024-07-16 NOTE — ED PROVIDER NOTE - PHYSICAL EXAMINATION
PA NOTE: GEN: Confused but cooperative. +Febrile. NAD. HEENT: Throat clear. Airway is patent. EYES: PERRLA. EOMI. Head: NC/AT. NECK: Supple, No JVD. FROM. C-spine non-tender. CV:S1S2, RRR, LUNGS: Non-labored breathing, no tachypnea. O2sat 100% RA. CTA b/l. No w/r/r. CHEST: Equal chest expansion and rise. No deformity. ABD: Distended ABD. +Mild diffuse tenderness. No rebound, no guarding. No CVAT. EXT: No e/c/c. 2+ distal pulses. SKIN: No rashes. NEURO: No focal deficits. CN II-XII intact. FROM. 5/5 motor and sensory. ~Issa Head PA-C

## 2024-07-16 NOTE — ED PROVIDER NOTE - OBJECTIVE STATEMENT
PA: Patient is a 89 year old male with PMHx of anxiety, dementia, depression, HLD, HTN, OA, who presents to [facility] from home BIB EMS c/o fever and poor appetite. Patient is a poor historian due to hx of dementia. ~Issa Head PA-C

## 2024-07-16 NOTE — ED ADULT NURSE NOTE - OBJECTIVE STATEMENT
pt. is a 89 y/o male a&o x1 (self) with complaints of fever and poor appetite. PMHx of anxiety, dementia, depression, HLD, HTN, OA. Patient is a poor historian due to hx of dementia. pt. endorsing no complaints, denying pain and any discomfort at this time. rectal temperature obtained, 100.2 F. pt. in no acute distress at this time. pt. denies nausea/ vomiting/ diarrhea. 20 gauge IV placed in right AC with labs drawn and sent. Scalp free of abrasions, defects, masses and swelling/Hair pattern normal/[place](s) - size and tension

## 2024-07-16 NOTE — ED PROVIDER NOTE - CLINICAL SUMMARY MEDICAL DECISION MAKING FREE TEXT BOX
Dr. Buitrago ED attending-2 year old male with PMHx of anxiety, dementia, depression, HLD, HTN, OA presents from home with poor PO intake, weakness and fever . Pt is poor historian 2/2 dementia.   Constitutional: frail appearing, AAOX1  Eyes: EOMI, PERRL  Head: Normocephalic atraumatic  Mouth: no airway obstruction, posterior oropharynx clear without erythema or exudate  Neck: supple  Cardiac: regular rate and rhythm, no MRG  Resp: Lungs CTAB  GI: Abd s/nt/nd  Neuro: CN2-12 intact, strength 4/5x4, sensation grossly intact  Skin: No rashes  MDM: Pt found to have LLL PNA on CXR, CTAP/CTH and UA negative. WBC <12, lactic WNL. Pt treated with 2L NC, ceftriaxone and azithromycin.   Admitted to Dr. Laughlin hospitalist attending for LLL PNA

## 2024-07-16 NOTE — ED ADULT TRIAGE NOTE - CHIEF COMPLAINT QUOTE
Patient presents to the ER with complaints of poor appetite and fever. Patient denies any other complaint.

## 2024-07-16 NOTE — ED ADULT NURSE REASSESSMENT NOTE - NS ED NURSE REASSESS COMMENT FT1
pt. intermittently catheterized for urine, 400 cc's obtained. urine sample sent to laboratory. pt. tolerated procedure, changed into gown with linens changed.

## 2024-07-16 NOTE — ED PROVIDER NOTE - ATTENDING APP SHARED VISIT CONTRIBUTION OF CARE
I, Ruth Buitrago DO, personally saw the patient with the PA, and completed the key components of the history and physical exam. I then discussed the management plan with the PA.

## 2024-07-17 LAB
ALBUMIN SERPL ELPH-MCNC: 3.1 G/DL — LOW (ref 3.3–5)
ALP SERPL-CCNC: 74 U/L — SIGNIFICANT CHANGE UP (ref 40–120)
ALT FLD-CCNC: 15 U/L — SIGNIFICANT CHANGE UP (ref 12–78)
ANION GAP SERPL CALC-SCNC: 5 MMOL/L — SIGNIFICANT CHANGE UP (ref 5–17)
AST SERPL-CCNC: 19 U/L — SIGNIFICANT CHANGE UP (ref 15–37)
BILIRUB SERPL-MCNC: 0.8 MG/DL — SIGNIFICANT CHANGE UP (ref 0.2–1.2)
BUN SERPL-MCNC: 19 MG/DL — SIGNIFICANT CHANGE UP (ref 7–23)
CALCIUM SERPL-MCNC: 8.8 MG/DL — SIGNIFICANT CHANGE UP (ref 8.5–10.1)
CHLORIDE SERPL-SCNC: 111 MMOL/L — HIGH (ref 96–108)
CO2 SERPL-SCNC: 29 MMOL/L — SIGNIFICANT CHANGE UP (ref 22–31)
CREAT SERPL-MCNC: 1.17 MG/DL — SIGNIFICANT CHANGE UP (ref 0.5–1.3)
EGFR: 58 ML/MIN/1.73M2 — LOW
GLUCOSE SERPL-MCNC: 95 MG/DL — SIGNIFICANT CHANGE UP (ref 70–99)
HCT VFR BLD CALC: 35.1 % — LOW (ref 39–50)
HGB BLD-MCNC: 11.6 G/DL — LOW (ref 13–17)
MAGNESIUM SERPL-MCNC: 2.2 MG/DL — SIGNIFICANT CHANGE UP (ref 1.6–2.6)
MCHC RBC-ENTMCNC: 32.5 PG — SIGNIFICANT CHANGE UP (ref 27–34)
MCHC RBC-ENTMCNC: 33 GM/DL — SIGNIFICANT CHANGE UP (ref 32–36)
MCV RBC AUTO: 98.3 FL — SIGNIFICANT CHANGE UP (ref 80–100)
PHOSPHATE SERPL-MCNC: 2.6 MG/DL — SIGNIFICANT CHANGE UP (ref 2.5–4.5)
PLATELET # BLD AUTO: 144 K/UL — LOW (ref 150–400)
POTASSIUM SERPL-MCNC: 4.1 MMOL/L — SIGNIFICANT CHANGE UP (ref 3.5–5.3)
POTASSIUM SERPL-SCNC: 4.1 MMOL/L — SIGNIFICANT CHANGE UP (ref 3.5–5.3)
PROT SERPL-MCNC: 6.5 GM/DL — SIGNIFICANT CHANGE UP (ref 6–8.3)
RBC # BLD: 3.57 M/UL — LOW (ref 4.2–5.8)
RBC # FLD: 12.5 % — SIGNIFICANT CHANGE UP (ref 10.3–14.5)
SODIUM SERPL-SCNC: 145 MMOL/L — SIGNIFICANT CHANGE UP (ref 135–145)
WBC # BLD: 3.72 K/UL — LOW (ref 3.8–10.5)
WBC # FLD AUTO: 3.72 K/UL — LOW (ref 3.8–10.5)

## 2024-07-17 PROCEDURE — 72128 CT CHEST SPINE W/O DYE: CPT | Mod: 26

## 2024-07-17 PROCEDURE — 99222 1ST HOSP IP/OBS MODERATE 55: CPT

## 2024-07-17 PROCEDURE — 72125 CT NECK SPINE W/O DYE: CPT | Mod: 26

## 2024-07-17 PROCEDURE — 72100 X-RAY EXAM L-S SPINE 2/3 VWS: CPT | Mod: 26

## 2024-07-17 RX ORDER — MIRTAZAPINE 15 MG/1
15 TABLET, FILM COATED ORAL AT BEDTIME
Refills: 0 | Status: DISCONTINUED | OUTPATIENT
Start: 2024-07-17 | End: 2024-07-19

## 2024-07-17 RX ORDER — HEPARIN SODIUM 50 [USP'U]/ML
5000 INJECTION, SOLUTION INTRAVENOUS EVERY 12 HOURS
Refills: 0 | Status: DISCONTINUED | OUTPATIENT
Start: 2024-07-17 | End: 2024-07-19

## 2024-07-17 RX ORDER — NEOMYCIN SULF/POLYMYXIN B/PRED 0.5 %
1 SUSPENSION, DROPS(FINAL DOSAGE FORM)(ML) OPHTHALMIC (EYE)
Refills: 0 | DISCHARGE

## 2024-07-17 RX ORDER — OXYBUTYNIN CHLORIDE 5 MG
5 TABLET ORAL
Refills: 0 | Status: DISCONTINUED | OUTPATIENT
Start: 2024-07-17 | End: 2024-07-19

## 2024-07-17 RX ORDER — CYANOCOBALAMIN (VITAMIN B-12) 1000 MCG
1000 TABLET, EXTENDED RELEASE ORAL DAILY
Refills: 0 | Status: DISCONTINUED | OUTPATIENT
Start: 2024-07-17 | End: 2024-07-19

## 2024-07-17 RX ORDER — SENNOSIDES 8.6 MG
2 TABLET ORAL AT BEDTIME
Refills: 0 | Status: DISCONTINUED | OUTPATIENT
Start: 2024-07-17 | End: 2024-07-19

## 2024-07-17 RX ORDER — MAGNESIUM, ALUMINUM HYDROXIDE 400-400
30 TABLET,CHEWABLE ORAL EVERY 4 HOURS
Refills: 0 | Status: DISCONTINUED | OUTPATIENT
Start: 2024-07-17 | End: 2024-07-19

## 2024-07-17 RX ORDER — ACETAMINOPHEN 325 MG
650 TABLET ORAL EVERY 6 HOURS
Refills: 0 | Status: DISCONTINUED | OUTPATIENT
Start: 2024-07-17 | End: 2024-07-19

## 2024-07-17 RX ORDER — ONDANSETRON HYDROCHLORIDE 2 MG/ML
4 INJECTION INTRAMUSCULAR; INTRAVENOUS EVERY 8 HOURS
Refills: 0 | Status: DISCONTINUED | OUTPATIENT
Start: 2024-07-17 | End: 2024-07-19

## 2024-07-17 RX ORDER — SOLIFENACIN SUCCINATE 10 MG/1
1 TABLET, FILM COATED ORAL
Refills: 0 | DISCHARGE

## 2024-07-17 RX ORDER — SERTRALINE HYDROCHLORIDE 100 MG/1
25 TABLET, FILM COATED ORAL DAILY
Refills: 0 | Status: DISCONTINUED | OUTPATIENT
Start: 2024-07-17 | End: 2024-07-19

## 2024-07-17 RX ORDER — AZITHROMYCIN 250 MG/1
500 TABLET, FILM COATED ORAL EVERY 24 HOURS
Refills: 0 | Status: COMPLETED | OUTPATIENT
Start: 2024-07-17 | End: 2024-07-18

## 2024-07-17 RX ORDER — GABAPENTIN
600 POWDER (GRAM) MISCELLANEOUS AT BEDTIME
Refills: 0 | Status: DISCONTINUED | OUTPATIENT
Start: 2024-07-17 | End: 2024-07-19

## 2024-07-17 RX ORDER — CEFTRIAXONE SODIUM 500 MG
1000 VIAL (EA) INJECTION EVERY 24 HOURS
Refills: 0 | Status: DISCONTINUED | OUTPATIENT
Start: 2024-07-17 | End: 2024-07-19

## 2024-07-17 RX ORDER — LOSARTAN POTASSIUM 100 MG/1
50 TABLET, FILM COATED ORAL DAILY
Refills: 0 | Status: DISCONTINUED | OUTPATIENT
Start: 2024-07-17 | End: 2024-07-19

## 2024-07-17 RX ORDER — LIPASE/PROTEASE/AMYLASE 4.5-25-20K
4 CAPSULE,DELAYED RELEASE (ENTERIC COATED) ORAL
Refills: 0 | Status: DISCONTINUED | OUTPATIENT
Start: 2024-07-17 | End: 2024-07-19

## 2024-07-17 RX ADMIN — HEPARIN SODIUM 5000 UNIT(S): 50 INJECTION, SOLUTION INTRAVENOUS at 21:47

## 2024-07-17 RX ADMIN — Medication 1 TABLET(S): at 11:11

## 2024-07-17 RX ADMIN — Medication 2 TABLET(S): at 21:47

## 2024-07-17 RX ADMIN — Medication 5 MILLIGRAM(S): at 21:47

## 2024-07-17 RX ADMIN — Medication 4 CAPSULE(S): at 11:13

## 2024-07-17 RX ADMIN — MIRTAZAPINE 15 MILLIGRAM(S): 15 TABLET, FILM COATED ORAL at 21:48

## 2024-07-17 RX ADMIN — Medication 4 CAPSULE(S): at 16:48

## 2024-07-17 RX ADMIN — Medication 1000 MILLIGRAM(S): at 16:48

## 2024-07-17 RX ADMIN — Medication 25 MILLIGRAM(S): at 21:48

## 2024-07-17 RX ADMIN — LOSARTAN POTASSIUM 50 MILLIGRAM(S): 100 TABLET, FILM COATED ORAL at 11:11

## 2024-07-17 RX ADMIN — AZITHROMYCIN 500 MILLIGRAM(S): 250 TABLET, FILM COATED ORAL at 16:48

## 2024-07-17 RX ADMIN — Medication 600 MILLIGRAM(S): at 21:50

## 2024-07-17 RX ADMIN — Medication 1000 MICROGRAM(S): at 11:12

## 2024-07-17 RX ADMIN — Medication 3 MILLIGRAM(S): at 21:47

## 2024-07-17 NOTE — H&P ADULT - NSHPLABSRESULTS_GEN_ALL_CORE
07-17-24 @ 10:29  Glucose 95 [70 - 99]  CO2 total 29 [22 - 31]  Chloride 111 [96 - 108]  Sodium 145 [135 - 145]  Potassium 4.1 [3.5 - 5.3]  Calcium 8.8 [8.5 - 10.1]  Creatinine 1.17 [0.50 - 1.30]  BUN 19 [7 - 23]  eGFR 58  Anion gap 5 [5 - 17]  AST 19 [15 - 37]  ALT 15 [12 - 78]  Alk phos 74 [40 - 120]  Albumin 3.1 [3.3 - 5.0]    WBC 3.72 [3.80 - 10.50]  Hemoglobin 11.6 [13.0 - 17.0]  Hematocrit 35.1 [39.0 - 50.0]  Platelets 144 [150 - 400]      < from: CT Head No Cont (07.16.24 @ 21:58) >  IMPRESSION:  No obvious acute intracranial hemorrhage or mass effect. Nonspecific   punctate calcification near the right operculum, new since 7/12/2013. It   is unclear whether this is related to vascular etiology. If clinically   indicated, short-term follow-up or MRI may be obtained for further   evaluation.    Paranasal sinus disease. Persistent right mastoid opacification.   Recommend clinical correlation to assess acute sinusitis/mastoiditis.      < from: CT Abdomen and Pelvis w/ IV Cont (07.16.24 @ 22:01) >  IMPRESSION:  Severe compression deformity of the superior endplate of the L2 vertebral   body which has progressed since the previous exam of 4/7/2024. Otherwise   stable exam as described above.      < from: Xray Chest 1 View-PORTABLE IMMEDIATE (07.16.24 @ 21:02) >  IMPRESSION:  No acute radiographic findings    RVP/UA negative 07-17-24 @ 10:29  Glucose 95 [70 - 99]  CO2 total 29 [22 - 31]  Chloride 111 [96 - 108]  Sodium 145 [135 - 145]  Potassium 4.1 [3.5 - 5.3]  Calcium 8.8 [8.5 - 10.1]  Creatinine 1.17 [0.50 - 1.30]  BUN 19 [7 - 23]  eGFR 58  Anion gap 5 [5 - 17]  AST 19 [15 - 37]  ALT 15 [12 - 78]  Alk phos 74 [40 - 120]  Albumin 3.1 [3.3 - 5.0]    WBC 3.72 [3.80 - 10.50]  Hemoglobin 11.6 [13.0 - 17.0]  Hematocrit 35.1 [39.0 - 50.0]  Platelets 144 [150 - 400]      < from: CT Head No Cont (07.16.24 @ 21:58) >  IMPRESSION:  No obvious acute intracranial hemorrhage or mass effect. Nonspecific   punctate calcification near the right operculum, new since 7/12/2013. It   is unclear whether this is related to vascular etiology. If clinically   indicated, short-term follow-up or MRI may be obtained for further   evaluation.    Paranasal sinus disease. Persistent right mastoid opacification.   Recommend clinical correlation to assess acute sinusitis/mastoiditis.      < from: CT Abdomen and Pelvis w/ IV Cont (07.16.24 @ 22:01) >  IMPRESSION:  Severe compression deformity of the superior endplate of the L2 vertebral   body which has progressed since the previous exam of 4/7/2024. Otherwise   stable exam as described above.      < from: Xray Chest 1 View-PORTABLE IMMEDIATE (07.16.24 @ 21:02) >  IMPRESSION:  No acute radiographic findings    RVP/UA negative    Personally reviewed labs, imaging, orders and vitals

## 2024-07-17 NOTE — CONSULT NOTE ADULT - SUBJECTIVE AND OBJECTIVE BOX
89y Male presents with low back pain. Admitted to medicine for fever workup. On CT scan was found to have incidental L2 VCF. Currently endorses mild low back pain that does not radiate to any extremities. Denies recent fall or trauma. Denies pain/injury elsewhere. Denies numbness/tingling/gait issues. Denies saddle anesthesia. Denies changes in bowel/bladder function. Recent mild fever to 100.5F now resolved. No other complaints at this time.      PAST MEDICAL & SURGICAL HISTORY:  Hypertension      Anxiety      Hyperlipidemia      OA (osteoarthritis)      Dementia      Depression      BPH (benign prostatic hyperplasia)      History of tonsillectomy      H/O total knee replacement, right        Home Medications:  gabapentin 600 mg oral tablet: 1 tab(s) orally once a day (at bedtime) (17 Jul 2024 10:27)  losartan 50 mg oral tablet: 0.5 tab(s) orally once a day (17 Jul 2024 10:42)  mirtazapine 15 mg oral tablet: 1 tab(s) orally once a day (at bedtime) (17 Jul 2024 10:27)  prednisolone/neomycin/polymyxin B 0.5%-0.35%-10,000 units/mL ophthalmic suspension: 1 application to each affected eye once a day prn eye infection/irritation (17 Jul 2024 10:50)  sertraline 25 mg oral tablet: 1 tab(s) orally once a day (17 Jul 2024 10:27)  solifenacin 10 mg oral tablet: 1 tab(s) orally once a day (17 Jul 2024 10:46)    Allergies    No Known Allergies    Intolerances                              11.6   3.72  )-----------( 144      ( 17 Jul 2024 10:29 )             35.1     07-17    145  |  111<H>  |  19  ----------------------------<  95  4.1   |  29  |  1.17    Ca    8.8      17 Jul 2024 10:29  Phos  2.6     07-17  Mg     2.2     07-17    TPro  6.5  /  Alb  3.1<L>  /  TBili  0.8  /  DBili  x   /  AST  19  /  ALT  15  /  AlkPhos  74  07-17    PT/INR - ( 16 Jul 2024 19:08 )   PT: 11.6 sec;   INR: 1.03 ratio         PTT - ( 16 Jul 2024 19:08 )  PTT:30.5 sec  Urinalysis Basic - ( 17 Jul 2024 10:29 )    Color: x / Appearance: x / SG: x / pH: x  Gluc: 95 mg/dL / Ketone: x  / Bili: x / Urobili: x   Blood: x / Protein: x / Nitrite: x   Leuk Esterase: x / RBC: x / WBC x   Sq Epi: x / Non Sq Epi: x / Bacteria: x        Vital Signs Last 24 Hrs  T(C): 37.2 (17 Jul 2024 16:16), Max: 38.1 (16 Jul 2024 18:36)  T(F): 98.9 (17 Jul 2024 16:16), Max: 100.5 (16 Jul 2024 18:36)  HR: 70 (17 Jul 2024 16:16) (55 - 82)  BP: 157/65 (17 Jul 2024 16:16) (148/89 - 184/79)  BP(mean): 89 (17 Jul 2024 07:41) (82 - 103)  RR: 18 (17 Jul 2024 16:16) (16 - 21)  SpO2: 98% (17 Jul 2024 16:16) (94% - 100%)    Parameters below as of 17 Jul 2024 16:16  Patient On (Oxygen Delivery Method): room air        EXAM:  Gen: NAD, awake and alert  Spine: mild TTP LSp, otherwise NTTP  Motor:                   C5                C6              C7               C8           T1   R            5/5                5/5            5/5             5/5          5/5  L             5/5               5/5             5/5             5/5          5/5                L2             L3             L4               L5            S1  R         5/5           5/5          5/5             5/5           5/5  L          5/5          5/5           5/5             5/5           5/5    Sensory:            C5         C6         C7      C8       T1        (0=absent, 1=impaired, 2=normal, NT=not testable)  R         2            2           2        2         2  L          2            2           2        2         2               L2          L3         L4      L5       S1         (0=absent, 1=impaired, 2=normal, NT=not testable)  R         2            2            2        2        2  L          2            2           2        2         2      Imaging:     CT Abdomen: L2 VCF - chronic-appearing. No other acute obvious fxs.    Assessment/Plan:   89y Male with L2 vertebral compression deformity  -Obtain XR LSp  -Pain control as needed  -WBAT/OOB with assistive devices as needed  -LSO for comfort prn  -No acute orthopedic surgical intervention at this time - please reconsult as needed  -F/u outpt w/ Dr. Yeager in 10-14 days after DC  -DVT ppx per primary team  -Medical management per primary team  -D/w Dr. Yeager who agrees

## 2024-07-17 NOTE — PATIENT PROFILE ADULT - FALL HARM RISK - HARM RISK INTERVENTIONS
Assistance with ambulation/Assistance OOB with selected safe patient handling equipment/Communicate Risk of Fall with Harm to all staff/Discuss with provider need for PT consult/Monitor for mental status changes/Monitor gait and stability/Provide patient with walking aids - walker, cane, crutches/Reinforce activity limits and safety measures with patient and family/Reorient to person, place and time as needed/Tailored Fall Risk Interventions/Toileting schedule using arm’s reach rule for commode and bathroom/Use of alarms - bed, chair and/or voice tab/Visual Cue: Yellow wristband and red socks/Bed in lowest position, wheels locked, appropriate side rails in place/Call bell, personal items and telephone in reach/Instruct patient to call for assistance before getting out of bed or chair/Non-slip footwear when patient is out of bed/[place] to call system/Physically safe environment - no spills, clutter or unnecessary equipment/Purposeful Proactive Rounding/Room/bathroom lighting operational, light cord in reach

## 2024-07-17 NOTE — H&P ADULT - TIME BILLING
I spent a total of 76  minutes in face-to-face time with the patient and on the floor managing patient including coordination of care. Overt 50% of the time spent in discussion of the diagnosis, counseling and treatment plan.

## 2024-07-17 NOTE — ED ADULT NURSE REASSESSMENT NOTE - NS ED NURSE REASSESS COMMENT FT1
pt. hypertensive at this time, 182/70. attempted to contact hospitalist, who is unavailable at this time. pt. in no apparent distress, asymptomatic. pt. resting in stretcher, endorsing no complaints. Rivaroxaban/Xarelto

## 2024-07-17 NOTE — H&P ADULT - NS ATTEND AMEND GEN_ALL_CORE FT
I saw and evaluated the patient.   Fever mild on admission. Subjective fever at home  ER evaluted for possible pneumonia. CT and CXRAY reviewed. No clear signs of pneumonia and no reported respiratory symptoms. Unclear etiology of reported fever. Will continue to monitor  UA and labs grossly unremarkable  Patient at baseline when it comes to dementia.

## 2024-07-17 NOTE — H&P ADULT - ASSESSMENT
89 year old male with PMHx of anxiety, dementia, depression, HLD, HTN, OA, BPH, who presented to  ED from home BIB EMS c/o fever and poor appetite. Admitted for:    #Fever, likely due to URI  #Generalized weakness, poor PO intake  - admit to med/surg  - Tmax 100.5F in ED, clinically does not meet sepsis criteria  - CT head negative for acute pathology  - CXR with mild atelectasis, no PNA or CHF noted  - RVP, UA negative  - s/p 2L IVF  - Continue empiric IV CTX, Azithro   - F/u BCx  - Monitor WBC, temps   - PT eval     #HTN urgency  - /83 , improving  - Increase home losartan to 50mg QD  - Monitor BP    #Severe compression deformity L2   - incidentally noted on CT abd/pelvis, progressed from prior imaging  - Ortho spine consulted Dr. Yeager   - Pain control    #Markedly dilated pancreatic duct  #Severe pancreatic atrophy  - CT abd/pelvis: large gallstones and markedly dilated pancreatic duct w/ severe pancreatic atrophy - both unchanged from prior CT   - Noted on last admission in 04/2024, had MRCP, after discussion with GI and family no further workup planned at this time per pt/family wishes  - Continue Creon     #Hx dementia, anxiety, BPH  - c/w home meds    #DNR/DNI  - MOLST in chart    #DVT ppx   - heparin SQ 89 year old male with PMHx of anxiety, dementia, depression, HLD, HTN, OA, BPH, who presented to  ED from home BIB EMS c/o fever and poor appetite. Admitted for:    #Fever, likely due to URI  #Generalized weakness, poor PO intake  - admit to med/surg  - Tmax 100.5F in ED, clinically does not meet sepsis criteria  - CT head negative for acute pathology  - CXR with mild atelectasis, no PNA or CHF noted  - RVP, UA negative  - s/p 2L IVF  - Continue empiric IV CTX, Azithro   - F/u BCx  - Monitor WBC, temps   - PT eval     #HTN urgency  - /83 , improving  - Increase home losartan to 50mg QD  - Monitor BP    #Severe compression deformity L2   - incidentally noted on CT abd/pelvis, progressed from prior imaging  - Ortho spine consulted Dr. Yeager   - Pain control    #Markedly dilated pancreatic duct  #Severe pancreatic atrophy  - CT abd/pelvis: large gallstones and markedly dilated pancreatic duct w/ severe pancreatic atrophy - both unchanged from prior CT   - Noted on last admission in 04/2024, had MRCP, after discussion with GI and family no further workup planned at this time per pt/family wishes  - Continue Creon     #Hx dementia, anxiety, BPH  - c/w home meds    #DNR/DNI  - MOLST in chart    #DVT ppx   - heparin SQ    I spent a total of 76  minutes in face-to-face time with the patient and on the floor managing patient including coordination of care. Overt 50% of the time spent in discussion of the diagonosis, counseling and treatment plan.

## 2024-07-17 NOTE — ED ADULT NURSE REASSESSMENT NOTE - NS ED NURSE REASSESS COMMENT FT1
Pt resting comfortably in stretcher, proactive rounding completed. Pt. assessed at bedside, aox1, No acute distress noted. Pt. updated on the current plan of care. Questions answered, no needs or requests at this time. Educated on safety and fall prevention measures. Remote control within reach. breakfast ordered.

## 2024-07-17 NOTE — PHARMACOTHERAPY INTERVENTION NOTE - COMMENTS
Attempted to contact family at multiple numbers to complete med rec, however, no answer. Completed med history based on [employer] med profile.  Med history complete. Med rec tech spoke with son Guero via telephone ([phone]) and confirmed med list with [employer] med profile. All medication related questions answered.

## 2024-07-17 NOTE — H&P ADULT - NSHPPHYSICALEXAM_GEN_ALL_CORE
Constitutional: NAD, awake and alert, mildly confused  HEENT: EOMI, dry MM, Right eye blind  Respiratory: Breath sounds are clear bilaterally, No wheezing, rales or rhonchi  Cardiovascular: S1 and S2, RRR, no murmurs, gallops or rubs  Gastrointestinal: +BS, soft, non-tender, mild-mod abd distention, no CVA tenderness  Extremities: No peripheral edema, +DP pulses b/l  Neurological: A&O x 1-2, no focal deficits  Musculoskeletal: 5/5 strength b/l upper and lower extremities  Skin: Normal, skin warm and dry Vitals reviewed on admission and stable.   Constitutional: NAD, awake and alert, mildly confused  HEENT: EOMI, dry MM, Right eye blind  Respiratory: Breath sounds are clear bilaterally, No wheezing, rales or rhonchi  Cardiovascular: S1 and S2, RRR, no murmurs, gallops or rubs  Gastrointestinal: +BS, soft, non-tender, mild-mod abd distention, no CVA tenderness  Extremities: No peripheral edema, +DP pulses b/l  Neurological: A&O x 1-2, no focal deficits  Musculoskeletal: 5/5 strength b/l upper and lower extremities  Skin: Normal, skin warm and dry

## 2024-07-17 NOTE — H&P ADULT - HISTORY OF PRESENT ILLNESS
89 year old male with PMHx of anxiety, dementia, depression, HLD, HTN, OA, BPH, who presented to  ED from home BIB EMS c/o fever and poor appetite. Patient is a poor historian due to hx of dementia, collateral information obtained by Son/HCP. Pt reportedly has had generalized weakness, fever and intermittent mild cough at home for few days. Son was unable to safely transfer patient to wheelchair yesterday and subsequently called EMS. On current exam, patient is alert but mildly confused, has no current complaints. Denies chest pain, SOB, n/v/d, abd pain, LE edema, dysuria, HA, dizziness.     In ED, /83, Tmax 100.5F oral, rest of VSS.  Labs overall unremarkable. CT head negative for acute pathology. CXR with mild atelectasis, no obvious PNA or CHF noted. RVP/UA negative. CT abd/pelvis obtained which incidentally noted severe compression deformity in L2 which progressed from prior imaging in April, also notes large gallstones and markedly dilated pancreatic duct w/ severe pancreatic atrophy - both unchanged from prior. He received IV CTX/azithro and IVF in ED. Admitted to med/surg for further management.

## 2024-07-18 LAB
ANION GAP SERPL CALC-SCNC: 6 MMOL/L — SIGNIFICANT CHANGE UP (ref 5–17)
BUN SERPL-MCNC: 22 MG/DL — SIGNIFICANT CHANGE UP (ref 7–23)
CALCIUM SERPL-MCNC: 8.6 MG/DL — SIGNIFICANT CHANGE UP (ref 8.5–10.1)
CHLORIDE SERPL-SCNC: 111 MMOL/L — HIGH (ref 96–108)
CO2 SERPL-SCNC: 27 MMOL/L — SIGNIFICANT CHANGE UP (ref 22–31)
CREAT SERPL-MCNC: 1.21 MG/DL — SIGNIFICANT CHANGE UP (ref 0.5–1.3)
EGFR: 56 ML/MIN/1.73M2 — LOW
GLUCOSE SERPL-MCNC: 94 MG/DL — SIGNIFICANT CHANGE UP (ref 70–99)
HCT VFR BLD CALC: 33.2 % — LOW (ref 39–50)
HGB BLD-MCNC: 11.2 G/DL — LOW (ref 13–17)
MCHC RBC-ENTMCNC: 32.9 PG — SIGNIFICANT CHANGE UP (ref 27–34)
MCHC RBC-ENTMCNC: 33.7 GM/DL — SIGNIFICANT CHANGE UP (ref 32–36)
MCV RBC AUTO: 97.6 FL — SIGNIFICANT CHANGE UP (ref 80–100)
PLATELET # BLD AUTO: 133 K/UL — LOW (ref 150–400)
POTASSIUM SERPL-MCNC: 3.7 MMOL/L — SIGNIFICANT CHANGE UP (ref 3.5–5.3)
POTASSIUM SERPL-SCNC: 3.7 MMOL/L — SIGNIFICANT CHANGE UP (ref 3.5–5.3)
RBC # BLD: 3.4 M/UL — LOW (ref 4.2–5.8)
RBC # FLD: 12.3 % — SIGNIFICANT CHANGE UP (ref 10.3–14.5)
SODIUM SERPL-SCNC: 144 MMOL/L — SIGNIFICANT CHANGE UP (ref 135–145)
WBC # BLD: 3.93 K/UL — SIGNIFICANT CHANGE UP (ref 3.8–10.5)
WBC # FLD AUTO: 3.93 K/UL — SIGNIFICANT CHANGE UP (ref 3.8–10.5)

## 2024-07-18 PROCEDURE — 99232 SBSQ HOSP IP/OBS MODERATE 35: CPT

## 2024-07-18 RX ORDER — METRONIDAZOLE 500 MG/1
500 TABLET ORAL EVERY 12 HOURS
Refills: 0 | Status: DISCONTINUED | OUTPATIENT
Start: 2024-07-18 | End: 2024-07-19

## 2024-07-18 RX ADMIN — HEPARIN SODIUM 5000 UNIT(S): 50 INJECTION, SOLUTION INTRAVENOUS at 21:10

## 2024-07-18 RX ADMIN — Medication 1 TABLET(S): at 17:44

## 2024-07-18 RX ADMIN — Medication 5 MILLIGRAM(S): at 21:09

## 2024-07-18 RX ADMIN — LOSARTAN POTASSIUM 50 MILLIGRAM(S): 100 TABLET, FILM COATED ORAL at 09:50

## 2024-07-18 RX ADMIN — Medication 4 CAPSULE(S): at 09:47

## 2024-07-18 RX ADMIN — Medication 1 TABLET(S): at 09:51

## 2024-07-18 RX ADMIN — HEPARIN SODIUM 5000 UNIT(S): 50 INJECTION, SOLUTION INTRAVENOUS at 09:51

## 2024-07-18 RX ADMIN — METRONIDAZOLE 500 MILLIGRAM(S): 500 TABLET ORAL at 21:10

## 2024-07-18 RX ADMIN — Medication 1000 MICROGRAM(S): at 09:51

## 2024-07-18 RX ADMIN — Medication 2 TABLET(S): at 21:09

## 2024-07-18 RX ADMIN — Medication 5 MILLIGRAM(S): at 09:51

## 2024-07-18 RX ADMIN — MIRTAZAPINE 15 MILLIGRAM(S): 15 TABLET, FILM COATED ORAL at 21:09

## 2024-07-18 RX ADMIN — Medication 4 CAPSULE(S): at 17:45

## 2024-07-18 RX ADMIN — AZITHROMYCIN 500 MILLIGRAM(S): 250 TABLET, FILM COATED ORAL at 17:44

## 2024-07-18 RX ADMIN — Medication 600 MILLIGRAM(S): at 21:09

## 2024-07-18 RX ADMIN — Medication 1000 MILLIGRAM(S): at 17:44

## 2024-07-18 RX ADMIN — SERTRALINE HYDROCHLORIDE 25 MILLIGRAM(S): 100 TABLET, FILM COATED ORAL at 09:51

## 2024-07-18 RX ADMIN — Medication 3 MILLIGRAM(S): at 21:09

## 2024-07-18 NOTE — DIETITIAN INITIAL EVALUATION ADULT - FEEDING SKILL
Impression: H/O Cataract (lens) fragments in eye following cataract surgery, right eye: H59.021. OD.
s/p PPV 10/2018 (Dr. Golden Ko) Plan: Fragments resolved s/p PPV. Observe. independent/age appropriate assistance

## 2024-07-18 NOTE — DIETITIAN INITIAL EVALUATION ADULT - OTHER INFO
89 y/o male with PMHx of anxiety, dementia, depression, HLD, HTN, OA, BPH, who presented to  ED from home BIB EMS c/o fever and poor appetite. Patient is a poor historian due to hx of dementia, collateral information obtained by Son/HCP. Pt reportedly has had generalized weakness, fever and intermittent mild cough at home for few days. Son was unable to safely transfer patient to wheelchair yesterday and subsequently called EMS.  GOC: DNR/ DNI/ determine use for TF.    Known to nutr services, met criteria for PCM on previous admit. Unable to obtain in-depth info upon assessment 2/2 dementia. States no changes in appetite since admission. RD observed breakfast tray, consumed 100% (eggs, potatoes). Wt hx reviewed: 178# on 4/8/24 (taken by RD). Bed scale wt not obtained 2/2 sitting in chair upon assessment. EMR wt doc'd at 180#. NFPE reveals mild to moderate muscle/fat wasting. Liberalize diet to regular to maximize caloric and nutrient intake. Will add Ensure + HP shake daily to optimize nutritional needs (provides 350 kcal, 20g protein/ shake). See other recs below.

## 2024-07-18 NOTE — PROGRESS NOTE ADULT - NUTRITIONAL ASSESSMENT
This patient has been assessed with a concern for Malnutrition and has been determined to have a diagnosis/diagnoses of Moderate protein-calorie malnutrition.    This patient is being managed with:   Diet DASH/TLC-  Sodium & Cholesterol Restricted  Entered: Jul 16 2024 11:04PM

## 2024-07-18 NOTE — DIETITIAN INITIAL EVALUATION ADULT - NSFNSGIIOFT_GEN_A_CORE
I&O's Detail    17 Jul 2024 07:01  -  18 Jul 2024 07:00  --------------------------------------------------------  IN:  Total IN: 0 mL    OUT:    Voided (mL): 300 mL  Total OUT: 300 mL    Total NET: -300 mL

## 2024-07-18 NOTE — DIETITIAN INITIAL EVALUATION ADULT - REASON INDICATOR FOR ASSESSMENT
[FreeTextEntry3] : I, Dr. Heron Muñiz, personally performed the evaluation and management (E/M) services for this established patient who presents today with (a) new problem(s)/exacerbation of (an) existing condition(s).  That E/M includes conducting the clinically appropriate interval history &/or exam, assessing all new/exacerbated conditions, and establishing a new plan of care.  Today, my DEEPTHI,  was here to observe &/or participate in the visit & follow plan of care established by me. 
MST score 2 or >

## 2024-07-18 NOTE — PROGRESS NOTE ADULT - ASSESSMENT
89 year old male with PMHx of anxiety, dementia, depression, HLD, HTN, OA, BPH, who presented to  ED from home BIB EMS c/o fever and poor appetite. Admitted for:    #Fever, likely due to Viral URI and Viral gastroenteritis  #Generalized weakness, poor PO intake  - Tmax 100.5F in ED, clinically does not meet sepsis criteria  - CT head negative for acute pathology  - CXR with mild atelectasis, no PNA or CHF noted  - RVP, UA negative  - s/p 2L IVF  ++Diarrhea  plan:  - Continue empiric IV CTX, Azithro   - F/u BCx  - Monitor WBC, temps   - PT eval   -lactobacillus  = GI and CDIFF    #HTN urgency  - /83 , improving  - Increase home losartan to 50mg QD  - Monitor BP    #Severe compression deformity L2   - incidentally noted on CT abd/pelvis, progressed from prior imaging  - Ortho spine consulted Dr. Yeager   - Pain control    #Markedly dilated pancreatic duct  #Severe pancreatic atrophy  - CT abd/pelvis: large gallstones and markedly dilated pancreatic duct w/ severe pancreatic atrophy - both unchanged from prior CT   - Noted on last admission in 04/2024, had MRCP, after discussion with GI and family no further workup planned at this time per pt/family wishes  - Continue Creon     #Hx dementia, anxiety, BPH  - c/w home meds    #DNR/DNI  - MOLST in chart    #DVT ppx   - heparin SQ

## 2024-07-18 NOTE — DIETITIAN INITIAL EVALUATION ADULT - ORAL INTAKE PTA/DIET HISTORY
Lives at home w/ son. States he has a good appetite at home. Unable to obtain further diet hx 2/2 dementia. Per H&P w/ poor appetite. Likely meeting <75% ENN. Per home meds review takes Remeron at nighttime (may enhance appetite).

## 2024-07-18 NOTE — PHYSICAL THERAPY INITIAL EVALUATION ADULT - ADDITIONAL COMMENTS
Pt is AAOx1 to self only; unable to provide information regarding prior level of function and living situation. Information obtained from care coordinator note. Pt lives in a condo with elevator access. Pt has a private hire aide at home from [facility] 7 days a week. Pt goes to [facility] as well. Exact prior level of function unknown.

## 2024-07-18 NOTE — DIETITIAN INITIAL EVALUATION ADULT - ADD RECOMMEND
1) Liberalize diet to regular to maximize caloric and nutrient intake. Encourage protein-rich foods, maximize food preferences  2) Will add Ensure + HP shake daily to optimize nutritional needs (provides 350 kcal, 20g protein/ shake)   3) Monitor bowel movements, if no BM for >3 days, consider implementing bowel regimen.  4) In v/o malnutrition consider adding 100mg thiamine daily  5) Obtain weekly wt to track/trend changes  6) Consider to obtain vitamin D 25OH level to assess nutriture  7) Confirm goals of care regarding nutrition support. Current MOLST reports determine use for TF. Nutrition support is not recommended due to advanced age which evidenced based studies indicate EN is not effective in prolonging survival and improving quality of life. It can also increase risk of aspiration pneumonia as well as other related issues (infection, GI complications, and worsening/ non-healing PI's). However, will provide nutrition/ hydration within GOC.   RD will continue to monitor PO intake, labs, hydration, and wt prn.

## 2024-07-18 NOTE — DIETITIAN INITIAL EVALUATION ADULT - PERTINENT LABORATORY DATA
07-18    144  |  111<H>  |  22  ----------------------------<  94  3.7   |  27  |  1.21    Ca    8.6      18 Jul 2024 07:41  Phos  2.6     07-17  Mg     2.2     07-17    TPro  6.5  /  Alb  3.1<L>  /  TBili  0.8  /  DBili  x   /  AST  19  /  ALT  15  /  AlkPhos  74  07-17  A1C with Estimated Average Glucose Result: 5.9 % (04-07-24 @ 09:11)

## 2024-07-18 NOTE — PROGRESS NOTE ADULT - SUBJECTIVE AND OBJECTIVE BOX
Evelia seen and examined  sitting in chair  denies pain  Reports new onset diarrhea  vitals stable afebrile  Orthopedic eval appreciated    Review of Systems:  General:denies fever chills, headache, weakness  HEENT: denies blurry vision,diffculty swallowing, difficulty hearing, tinnitus  Cardiovascular: denies chest pain  ,palpitations  Pulmonary:denies shortness of breath, cough, wheezing, hemoptysis  Gastrointestinal: denies abdominal pain, constipation, diarrhea,nausea , vomiting, hematochezia  : denies hematuria, dysuria, or incontinence  Neurological: denies weakness, numbness , tingling, dizziness, tremors  MSK: denies muscle pain, difficulty ambulating, swelling, back pain  skin: denies skin rash, itching, burning, or  skin lesions  Psychiatrical: denies mood disturbances, anxierty, feeling depressed, depression , or difficulty sleeping    Objective:  Vitals  T(C): 36.3 (07-18-24 @ 07:55), Max: 37.2 (07-17-24 @ 16:16)  HR: 52 (07-18-24 @ 07:55) (52 - 70)  BP: 157/56 (07-18-24 @ 07:55) (157/56 - 168/92)  RR: 18 (07-18-24 @ 07:55) (18 - 18)  SpO2: 99% (07-18-24 @ 07:55) (97% - 99%)    Physical Exam:  General: comfortable, no acute distress  HEENT: Atraumatic, no LAD, trachea midline, PERRLA  Cardiovascular: normal s1s2, no murmurs, gallops or fricition rubs  Pulmonary: clear to ausculation Bilaterally, no wheezing , rhonchi  Gastrointestinal: soft non tender non distended, no masses felt, no organomegally  Muscloskeletal: no lower extremity edema, intact bilateral lower extremity pulses  Neurological: CN II-12 intact. No focal weakness  Psychiatrical: normal mood, cooperative  SKIN: no rash, lesions or ulcers    Labs:                          11.2   3.93  )-----------( 133      ( 18 Jul 2024 07:41 )             33.2     07-18    144  |  111<H>  |  22  ----------------------------<  94  3.7   |  27  |  1.21    Ca    8.6      18 Jul 2024 07:41  Phos  2.6     07-17  Mg     2.2     07-17    TPro  6.5  /  Alb  3.1<L>  /  TBili  0.8  /  DBili  x   /  AST  19  /  ALT  15  /  AlkPhos  74  07-17    LIVER FUNCTIONS - ( 17 Jul 2024 10:29 )  Alb: 3.1 g/dL / Pro: 6.5 gm/dL / ALK PHOS: 74 U/L / ALT: 15 U/L / AST: 19 U/L / GGT: x           PT/INR - ( 16 Jul 2024 19:08 )   PT: 11.6 sec;   INR: 1.03 ratio         PTT - ( 16 Jul 2024 19:08 )  PTT:30.5 sec      Active Medications  MEDICATIONS  (STANDING):  azithromycin   Tablet 500 milliGRAM(s) Oral every 24 hours  cefTRIAXone Injectable. 1000 milliGRAM(s) IV Push every 24 hours  cyanocobalamin 1000 MICROGram(s) Oral daily  gabapentin 600 milliGRAM(s) Oral at bedtime  heparin   Injectable 5000 Unit(s) SubCutaneous every 12 hours  lactobacillus acidophilus 1 Tablet(s) Oral three times a day with meals  losartan 50 milliGRAM(s) Oral daily  metroNIDAZOLE    Tablet 500 milliGRAM(s) Oral every 12 hours  mirtazapine 15 milliGRAM(s) Oral at bedtime  multivitamin 1 Tablet(s) Oral daily  oxybutynin 5 milliGRAM(s) Oral two times a day  pancrelipase  (CREON 12,000 Lipase Units) 4 Capsule(s) Oral three times a day with meals  senna 2 Tablet(s) Oral at bedtime  sertraline 25 milliGRAM(s) Oral daily    MEDICATIONS  (PRN):  acetaminophen     Tablet .. 650 milliGRAM(s) Oral every 6 hours PRN Temp greater or equal to 38C (100.4F), Mild Pain (1 - 3)  aluminum hydroxide/magnesium hydroxide/simethicone Suspension 30 milliLiter(s) Oral every 4 hours PRN Dyspepsia  melatonin 3 milliGRAM(s) Oral at bedtime PRN Insomnia  ondansetron Injectable 4 milliGRAM(s) IV Push every 8 hours PRN Nausea and/or Vomiting  QUEtiapine 25 milliGRAM(s) Oral at bedtime PRN agitation

## 2024-07-18 NOTE — PHYSICAL THERAPY INITIAL EVALUATION ADULT - PERTINENT HX OF CURRENT PROBLEM, REHAB EVAL
Pt is a 89 year old male presenting with fever and decreased appetite. Found to have incidental L2 VCF. Per ortho - no acute orthopedic surgical intervention at this time, LSO for comfort. PMH listed below.

## 2024-07-18 NOTE — DIETITIAN INITIAL EVALUATION ADULT - PERTINENT MEDS FT
MEDICATIONS  (STANDING):  azithromycin   Tablet 500 milliGRAM(s) Oral every 24 hours  cefTRIAXone Injectable. 1000 milliGRAM(s) IV Push every 24 hours  cyanocobalamin 1000 MICROGram(s) Oral daily  gabapentin 600 milliGRAM(s) Oral at bedtime  heparin   Injectable 5000 Unit(s) SubCutaneous every 12 hours  losartan 50 milliGRAM(s) Oral daily  mirtazapine 15 milliGRAM(s) Oral at bedtime  multivitamin 1 Tablet(s) Oral daily  oxybutynin 5 milliGRAM(s) Oral two times a day  pancrelipase  (CREON 12,000 Lipase Units) 4 Capsule(s) Oral three times a day with meals  senna 2 Tablet(s) Oral at bedtime  sertraline 25 milliGRAM(s) Oral daily    MEDICATIONS  (PRN):  acetaminophen     Tablet .. 650 milliGRAM(s) Oral every 6 hours PRN Temp greater or equal to 38C (100.4F), Mild Pain (1 - 3)  aluminum hydroxide/magnesium hydroxide/simethicone Suspension 30 milliLiter(s) Oral every 4 hours PRN Dyspepsia  melatonin 3 milliGRAM(s) Oral at bedtime PRN Insomnia  ondansetron Injectable 4 milliGRAM(s) IV Push every 8 hours PRN Nausea and/or Vomiting  QUEtiapine 25 milliGRAM(s) Oral at bedtime PRN agitation

## 2024-07-19 ENCOUNTER — TRANSCRIPTION ENCOUNTER (OUTPATIENT)
Age: 89
End: 2024-07-19

## 2024-07-19 VITALS
SYSTOLIC BLOOD PRESSURE: 136 MMHG | RESPIRATION RATE: 18 BRPM | DIASTOLIC BLOOD PRESSURE: 67 MMHG | OXYGEN SATURATION: 98 % | HEART RATE: 65 BPM | TEMPERATURE: 98 F

## 2024-07-19 LAB
ANION GAP SERPL CALC-SCNC: 4 MMOL/L — LOW (ref 5–17)
BASOPHILS # BLD AUTO: 0.02 K/UL — SIGNIFICANT CHANGE UP (ref 0–0.2)
BASOPHILS NFR BLD AUTO: 0.6 % — SIGNIFICANT CHANGE UP (ref 0–2)
BUN SERPL-MCNC: 23 MG/DL — SIGNIFICANT CHANGE UP (ref 7–23)
CALCIUM SERPL-MCNC: 8.6 MG/DL — SIGNIFICANT CHANGE UP (ref 8.5–10.1)
CHLORIDE SERPL-SCNC: 112 MMOL/L — HIGH (ref 96–108)
CO2 SERPL-SCNC: 27 MMOL/L — SIGNIFICANT CHANGE UP (ref 22–31)
CREAT SERPL-MCNC: 1.28 MG/DL — SIGNIFICANT CHANGE UP (ref 0.5–1.3)
EGFR: 53 ML/MIN/1.73M2 — LOW
EOSINOPHIL # BLD AUTO: 0.06 K/UL — SIGNIFICANT CHANGE UP (ref 0–0.5)
EOSINOPHIL NFR BLD AUTO: 1.7 % — SIGNIFICANT CHANGE UP (ref 0–6)
GLUCOSE SERPL-MCNC: 100 MG/DL — HIGH (ref 70–99)
HCT VFR BLD CALC: 32.5 % — LOW (ref 39–50)
HGB BLD-MCNC: 11.1 G/DL — LOW (ref 13–17)
IMM GRANULOCYTES NFR BLD AUTO: 0.3 % — SIGNIFICANT CHANGE UP (ref 0–0.9)
LYMPHOCYTES # BLD AUTO: 1.11 K/UL — SIGNIFICANT CHANGE UP (ref 1–3.3)
LYMPHOCYTES # BLD AUTO: 31 % — SIGNIFICANT CHANGE UP (ref 13–44)
MCHC RBC-ENTMCNC: 33.4 PG — SIGNIFICANT CHANGE UP (ref 27–34)
MCHC RBC-ENTMCNC: 34.2 GM/DL — SIGNIFICANT CHANGE UP (ref 32–36)
MCV RBC AUTO: 97.9 FL — SIGNIFICANT CHANGE UP (ref 80–100)
MONOCYTES # BLD AUTO: 0.48 K/UL — SIGNIFICANT CHANGE UP (ref 0–0.9)
MONOCYTES NFR BLD AUTO: 13.4 % — SIGNIFICANT CHANGE UP (ref 2–14)
NEUTROPHILS # BLD AUTO: 1.9 K/UL — SIGNIFICANT CHANGE UP (ref 1.8–7.4)
NEUTROPHILS NFR BLD AUTO: 53 % — SIGNIFICANT CHANGE UP (ref 43–77)
PLATELET # BLD AUTO: 139 K/UL — LOW (ref 150–400)
POTASSIUM SERPL-MCNC: 3.9 MMOL/L — SIGNIFICANT CHANGE UP (ref 3.5–5.3)
POTASSIUM SERPL-SCNC: 3.9 MMOL/L — SIGNIFICANT CHANGE UP (ref 3.5–5.3)
RBC # BLD: 3.32 M/UL — LOW (ref 4.2–5.8)
RBC # FLD: 12.5 % — SIGNIFICANT CHANGE UP (ref 10.3–14.5)
SODIUM SERPL-SCNC: 143 MMOL/L — SIGNIFICANT CHANGE UP (ref 135–145)
WBC # BLD: 3.58 K/UL — LOW (ref 3.8–10.5)
WBC # FLD AUTO: 3.58 K/UL — LOW (ref 3.8–10.5)

## 2024-07-19 PROCEDURE — 99239 HOSP IP/OBS DSCHRG MGMT >30: CPT

## 2024-07-19 RX ORDER — LOSARTAN POTASSIUM 100 MG/1
1 TABLET, FILM COATED ORAL
Qty: 0 | Refills: 0 | DISCHARGE
Start: 2024-07-19

## 2024-07-19 RX ORDER — CEFPODOXIME PROXETIL 50 MG/5 ML
1 SUSPENSION, RECONSTITUTED, ORAL (ML) ORAL
Qty: 6 | Refills: 0
Start: 2024-07-19 | End: 2024-07-21

## 2024-07-19 RX ADMIN — Medication 4 CAPSULE(S): at 13:24

## 2024-07-19 RX ADMIN — Medication 1000 MICROGRAM(S): at 10:28

## 2024-07-19 RX ADMIN — Medication 5 MILLIGRAM(S): at 10:26

## 2024-07-19 RX ADMIN — LOSARTAN POTASSIUM 50 MILLIGRAM(S): 100 TABLET, FILM COATED ORAL at 10:28

## 2024-07-19 RX ADMIN — Medication 4 CAPSULE(S): at 10:24

## 2024-07-19 RX ADMIN — Medication 1 TABLET(S): at 10:26

## 2024-07-19 RX ADMIN — HEPARIN SODIUM 5000 UNIT(S): 50 INJECTION, SOLUTION INTRAVENOUS at 10:27

## 2024-07-19 RX ADMIN — SERTRALINE HYDROCHLORIDE 25 MILLIGRAM(S): 100 TABLET, FILM COATED ORAL at 10:28

## 2024-07-19 RX ADMIN — METRONIDAZOLE 500 MILLIGRAM(S): 500 TABLET ORAL at 10:28

## 2024-07-19 NOTE — DISCHARGE NOTE NURSING/CASE MANAGEMENT/SOCIAL WORK - PATIENT PORTAL LINK FT
You can access the FollowMyHealth Patient Portal offered by [facility] by registering at the following website: [email or web address]. By joining [facility]’s FollowMyHealth portal, you will also be able to view your health information using other applications (apps) compatible with our system.
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
## 2024-07-19 NOTE — DISCHARGE NOTE NURSING/CASE MANAGEMENT/SOCIAL WORK - NSDCFUADDAPPT_GEN_ALL_CORE_FT
Dr Tashi Akbar  [address] [place] [address], [address],   [place] [address]  [phone]  Tuesday July 30th @3:40pm

## 2024-07-19 NOTE — DISCHARGE NOTE PROVIDER - HOSPITAL COURSE
resented to  ED from home BIB EMS c/o fever and poor appetite. Admitted for:    #Fever, likely due to Viral URI and Viral gastroenteritis  #Generalized weakness, poor PO intake  - Tmax 100.5F in ED, clinically does not meet sepsis criteria  - CT head negative for acute pathology  - CXR with mild atelectasis, no PNA or CHF noted  - RVP, UA negative  - s/p 2L IVF  -sp empiric antibiotics  -had temp bout of diarrhea resolved  BCx negative    #HTN urgency  - /83 , improving  - Increase home losartan to 50mg QD  - Monitor BP    #Severe compression deformity L2   - incidentally noted on CT abd/pelvis, progressed from prior imaging  - Ortho spine consulted Dr. Yeager   - Pain control    #diarrhea; temporary resolved  d/w nursing: not diarrhea loose stool:  plan  lactobacillus      #Markedly dilated pancreatic duct  #Severe pancreatic atrophy  - CT abd/pelvis: large gallstones and markedly dilated pancreatic duct w/ severe pancreatic atrophy - both unchanged from prior CT   - Noted on last admission in 04/2024, had MRCP, after discussion with GI and family no further workup planned at this time per pt/family wishes  - Continue Creon   -d/w  with son ;patient should be on creon to avoid diarrhea    #Hx dementia, anxiety, BPH  - c/w home meds    #DNR/DNI  - MOLST in chart    #DVT ppx   - heparin SQ    Critical updates on discharge: Patient at  baseline, afebrile no diarrhea  stop abx

## 2024-07-19 NOTE — DISCHARGE NOTE PROVIDER - NSDCQMCOGNITION_NEU_ALL_CORE
HPI


Chief Complaint:  Abdominal Pain


Time Seen by Provider:  12:03


Travel History


International Travel<30 days:  No


Contact w/Intl Traveler<30days:  No


Traveled to known affect area:  No





History of Present Illness


HPI


54-year-old male came to the emergency room with history of abdominal pain in 

the left lower quadrant since past 48 hours with no radiation.  He says the 

pain has been progressively worsening rate he's having trouble walking.  

Patient says he has history of diverticulitis and this pain feels exactly the 

same as before.  He has been nauseous but no vomiting.  He has been having 

normal bowel movement.  His last bowel movement was this morning.  No history 

of rectal bleeding.  1 week ago patient said he had fever with chills but no 

abdominal pain.  Vital signs were stable in the triage.  He does look quite 

uncomfortable especially when asked to lay down on the stretcher.





Formerly [facility]


Past Medical History


*** Narrative Medical


List of his past medical, social and family history was reviewed from the 

nursing note as well as the patient.


High Cholesterol:  Yes


Diverticulitis:  Yes


GERD:  Yes


Thyroid Disease:  Yes


Tetanus Vaccination:  > 5 Years


Influenza Vaccination:  Yes





Family History


Family Hypercholesterolemia:  Yes





Social History


Alcohol Use:  Yes (~1 6 PACK WEEKLY)


Tobacco Use:  Yes (~1 PACK PER WEEK)


Substance Use:  No





Allergies-Medications


(Allergen,Severity, Reaction):  


Coded Allergies:  


     No Known Allergies (Unverified , 2/17/17)


Comments


No known drug allergies.


Reported Meds & Prescriptions





Reported Meds & Active Scripts


Active


Hydrocodone-Acetaminophen 5-325 mg Tab 1 Tab PO Q6H PRN


Ciprofloxacin (Ciprofloxacin HCl) 500 Mg Tab 500 Mg PO BID 10 Days


Flagyl (Metronidazole) 250 Mg Tab 250 Mg PO QID 10 Days


Reported


Simvastatin 80 Mg Tab 80 Mg PO HS


Prilosec (Omeprazole) 20 Mg Cap 20 Mg PO DAILY


Synthroid (Levothyroxine Sodium) 175 Mcg Tab 175 Mcg PO DAILY





Narrative Medication


List of his home medications reviewed from the nursing note.





Review of Systems


Except as stated in HPI:  all other systems reviewed are Neg





Physical Exam


Narrative


GENERAL: Awake, alert, moderate to significant distress especially upon moving


SKIN: Warm and dry.


HEAD: Atraumatic. Normocephalic. 


EYES: Pupils equal and round. No scleral icterus. No injection or drainage. 


ENT: No nasal bleeding or discharge.  Mucous membranes pink and moist.


NECK: Trachea midline. No JVD. 


CARDIOVASCULAR: Regular rate and rhythm.  No murmur appreciated.


RESPIRATORY: No accessory muscle use. Clear to auscultation. Breath sounds 

equal bilaterally. 


GASTROINTESTINAL: Left lower quadrant abdominal tenderness with guarding.. 

Hepatic and splenic margins not palpable. 


MUSCULOSKELETAL: No obvious deformities. No clubbing.  No cyanosis.  No edema. 


NEUROLOGICAL: Awake and alert. No obvious cranial nerve deficits.  Motor 

grossly within normal limits. Normal speech.


PSYCHIATRIC: Appropriate mood and affect; insight and judgment normal.





Data


Data


Last Documented VS





Vital Signs








  Date Time  Temp Pulse Resp B/P Pulse Ox O2 Delivery O2 Flow Rate FiO2


 


2/17/17 15:35  58 17 148/86 98 Room Air  


 


2/17/17 11:32 98.0       








Orders





 Complete Blood Count With Diff (2/17/17 12:13)


Comprehensive Metabolic Panel (2/17/17 12:13)


Lipase (2/17/17 12:13)


Urinalysis - C+S If Indicated (2/17/17 12:13)


Ct Abd/Pel W/O Iv Contrast (2/17/17 12:13)


Iv Access Insert/Monitor (2/17/17 12:13)


Ecg Monitoring (2/17/17 12:13)


Oximetry (2/17/17 12:13)


Ondansetron Inj (Zofran Inj) (2/17/17 12:15)


Sodium Chlor 0.9% 1000 Ml Inj (Ns 1000 M (2/17/17 12:13)


Sodium Chloride 0.9% Flush (Ns Flush) (2/17/17 12:15)


Morphine Inj (Morphine Inj) (2/17/17 12:15)


Ciprofloxacin 400 Mg Premix (Cipro 400 M (2/17/17 12:15)


Metronidazole 500 Mg Inj (Flagyl 500 Mg (2/17/17 12:15)


Blood Culture (2/17/17 12:13)





Labs





 Laboratory Tests








Test 2/17/17 2/17/17





 12:25 12:30


 


Urine Collection Type CLEAN CATCH  


 


Urine Color YELLOW  


 


Urine Turbidity CLEAR  


 


Urine pH 5.0  


 


Urine Specific Gravity 1.026  


 


Urine Protein NEG mg/dL 


 


Urine Glucose (UA) NEG mg/dL 


 


Urine Ketones NEG mg/dL 


 


Urine Occult Blood NEG  


 


Urine Nitrite NEG  


 


Urine Bilirubin NEG  


 


Urine Leukocyte Esterase NEG  


 


Urine RBC 0-3 /hpf 


 


Urine WBC 0-2 /hpf 


 


Urine Squamous Epithelial 0-5 /hpf 





Cells  


 


Urine Mucus FEW /lpf 


 


Microscopic Urinalysis Comment CULT NOT 





 INDICATED 


 


Urine Collection Time 12:25  


 


White Blood Count  8.7 TH/MM3


 


Red Blood Count  4.63 MIL/MM3


 


Hemoglobin  14.8 GM/DL


 


Hematocrit  43.2 %


 


Mean Corpuscular Volume  93.4 FL


 


Mean Corpuscular Hemoglobin  32.0 PG


 


Mean Corpuscular Hemoglobin  34.2 %





Concent  


 


Red Cell Distribution Width  11.9 %


 


Platelet Count  357 TH/MM3


 


Mean Platelet Volume  8.4 FL


 


Neutrophils (%) (Auto)  73.3 %


 


Lymphocytes (%) (Auto)  16.7 %


 


Monocytes (%) (Auto)  8.3 %


 


Eosinophils (%) (Auto)  1.1 %


 


Basophils (%) (Auto)  0.6 %


 


Neutrophils # (Auto)  6.3 TH/MM3


 


Lymphocytes # (Auto)  1.5 TH/MM3


 


Monocytes # (Auto)  0.7 TH/MM3


 


Eosinophils # (Auto)  0.1 TH/MM3


 


Basophils # (Auto)  0.1 TH/MM3


 


CBC Comment  DIFF FINAL 


 


Differential Comment   


 


Sodium Level  141 MEQ/L


 


Potassium Level  4.1 MEQ/L


 


Chloride Level  105 MEQ/L


 


Carbon Dioxide Level  28.5 MEQ/L


 


Anion Gap  8 MEQ/L


 


Blood Urea Nitrogen  14 MG/DL


 


Creatinine  0.97 MG/DL


 


Estimat Glomerular Filtration  81 ML/MIN





Rate  


 


Random Glucose  105 MG/DL


 


Calcium Level  9.0 MG/DL


 


Total Bilirubin  0.8 MG/DL


 


Aspartate Amino Transf  20 U/L





(AST/SGOT)  


 


Alanine Aminotransferase  42 U/L





(ALT/SGPT)  


 


Alkaline Phosphatase  102 U/L


 


Total Protein  7.3 GM/DL


 


Albumin  4.1 GM/DL


 


Lipase  185 U/L











[facility]


Medical Decision Making


Medical Screen Exam Complete:  Yes


Emergency Medical Condition:  Yes


Medical Record Reviewed:  Yes


Differential Diagnosis


Acute diverticulitis, diverticular abscess, colitis


Narrative Course


2 PM given the high suspicion for acute diverticulitis I started the patient on 

IV Cipro and Flagyl.  He was given 1 L of IV fluid bolus.  Blood test results 

came back which were within normal limits.  CAT scan showed acute 

diverticulitis but no abscess or perforation.  I explained all the test results 

to the patient and the diagnosis.  He will be discharged home on a prescription 

for ciprofloxacin, Flagyl and hydrocodone.  He has been given instructions to 

return if symptoms worsen.  He understands and he is comfortable going home.  

He says that he has an appointment set up with a GI specialist in [facility].





Procedures


EKG Prior to Arrival:  No





Diagnosis





 Primary Impression:  


 Acute diverticulitis


Referrals:  


Primary Care Physician


3 days





***Additional Instructions:


Please return to the ER if the condition worsens or any other new concerns.  

Take the medications as per the prescription direction.  Do not drive while 

taking the pain medication as it will make you groggy.  Return to the ER if 

there is vomiting, worsening of symptoms, fever with chills or any other 

concerns otherwise follow-up with your primary care in couple days.  Take clear 

liquid diet for the next 48 hours.  Do not take your cholesterol pills while 

you are taking the ciprofloxacin due to drug interactions.


***Med/Other Pt SpecificInfo:  Prescription(s) given


Scripts


Hydrocodone-Acetaminophen 5-325 mg Tab1 Tab PO Q6H PRN (PAIN) #10 TAB  Ref 0


   Prov:Leticia Burgos MD         2/17/17 


Ciprofloxacin 500 Mg [id number] Mg PO BID  10 Days  Ref 0


   Prov:Leticia Burgos MD         2/17/17 


Metronidazole (Flagyl)250 Mg Qbc633 Mg PO QID  10 Days  Ref 0


   Prov:Leticia Burgos MD         2/17/17


Disposition:  01 DISCHARGE HOME


Condition:  Stable








Leticia Burgos MD Feb 17, 2017 12:05
Difficulty making decisions/Difficulty remembering

## 2024-07-19 NOTE — DISCHARGE NOTE PROVIDER - NSDCFUADDAPPT_GEN_ALL_CORE_FT
Dr Tashi Akbar  [address] [place] [address], [address],   [place] [address]  [phone]  Tuesday July 30th @3:40pm

## 2024-07-19 NOTE — DISCHARGE NOTE PROVIDER - NSDCCPCAREPLAN_GEN_ALL_CORE_FT
PRINCIPAL DISCHARGE DIAGNOSIS  Diagnosis: Left lower lobe pneumonia  Assessment and Plan of Treatment: You were admitted and evaluated for:  Fever, likely due to Viral URI and Viral gastroenteritis  Imaging:  - Cat scan head negative for acute pathology  - Chest XRay with mild atelectasis, no PNA or CHF noted  - Viral panel was negative, Urine negative  -Blood cultures are negative  Instructions:  Please complete the short course of antibiotics  HTN urgency  We have increased your blood pressure medication to 50mg daily  Severe compression deformity L2   - incidentally noted on CT abd/pelvis, progressed from prior imaging  - Ortho spine consulted Dr. Yeager   - Pain control  Markedly dilated pancreatic duct  Severe pancreatic atrophy  - CT abd/pelvis: large gallstones and markedly dilated pancreatic duct w/ severe pancreatic atrophy - both unchanged from prior CT   Instructions:  - Continue Creon   Critical updates on discharge: Patient at  baseline, afebrile no diarrhea  stop abx

## 2024-07-19 NOTE — DISCHARGE NOTE PROVIDER - NSDCQMSTAIRS_GEN_ALL_CORE
Render Post-Care Instructions In Note?: no Detail Level: Detailed Consent: The patient's consent was obtained including but not limited to risks of crusting, scabbing, blistering, scarring, darker or lighter pigmentary change, recurrence, incomplete removal and infection. Medical Necessity Clause: This procedure was medically necessary because the lesions that were treated were: Medical Necessity Information: It is in your best interest to select a reason for this procedure from the list below. All of these items fulfill various CMS LCD requirements except the new and changing color options. Treatment Number (Will Not Render If 0): 0 Anesthesia Volume In Cc: 0.5 Post-Care Instructions: I reviewed with the patient in detail post-care instructions. Patient is to wear sunprotection, and avoid picking at any of the treated lesions. Pt may apply Vaseline to crusted or scabbing areas. Yes

## 2024-07-19 NOTE — DISCHARGE NOTE PROVIDER - DETAILS OF MALNUTRITION DIAGNOSIS/DIAGNOSES
This patient has been assessed with a concern for Malnutrition and was treated during this hospitalization for the following Nutrition diagnosis/diagnoses:     -  07/18/2024: Moderate protein-calorie malnutrition

## 2024-07-19 NOTE — DISCHARGE NOTE PROVIDER - NSDCFUSCHEDAPPT_GEN_ALL_CORE_FT
[facility]  INTMED [facility]  Scheduled Appointment: 07/30/2024    [facility]  GERIATRICS [address] [place] [address]   Scheduled Appointment: 09/13/2024

## 2024-07-22 ENCOUNTER — NON-APPOINTMENT (OUTPATIENT)
Age: 89
End: 2024-07-22

## 2024-07-22 ENCOUNTER — TRANSCRIPTION ENCOUNTER (OUTPATIENT)
Age: 89
End: 2024-07-22

## 2024-07-26 DIAGNOSIS — I16.0 HYPERTENSIVE URGENCY: ICD-10-CM

## 2024-07-26 DIAGNOSIS — E78.5 HYPERLIPIDEMIA, UNSPECIFIED: ICD-10-CM

## 2024-07-26 DIAGNOSIS — N40.0 BENIGN PROSTATIC HYPERPLASIA WITHOUT LOWER URINARY TRACT SYMPTOMS: ICD-10-CM

## 2024-07-26 DIAGNOSIS — F03.90 UNSPECIFIED DEMENTIA, UNSPECIFIED SEVERITY, WITHOUT BEHAVIORAL DISTURBANCE, PSYCHOTIC DISTURBANCE, MOOD DISTURBANCE, AND ANXIETY: ICD-10-CM

## 2024-07-26 DIAGNOSIS — J06.9 ACUTE UPPER RESPIRATORY INFECTION, UNSPECIFIED: ICD-10-CM

## 2024-07-26 DIAGNOSIS — E44.0 MODERATE PROTEIN-CALORIE MALNUTRITION: ICD-10-CM

## 2024-07-26 DIAGNOSIS — A08.4 VIRAL INTESTINAL INFECTION, UNSPECIFIED: ICD-10-CM

## 2024-07-26 DIAGNOSIS — I10 ESSENTIAL (PRIMARY) HYPERTENSION: ICD-10-CM

## 2024-07-26 DIAGNOSIS — F41.9 ANXIETY DISORDER, UNSPECIFIED: ICD-10-CM

## 2024-07-26 DIAGNOSIS — Z66 DO NOT RESUSCITATE: ICD-10-CM

## 2024-07-30 ENCOUNTER — APPOINTMENT (OUTPATIENT)
Dept: INTERNAL MEDICINE | Facility: CLINIC | Age: 89
End: 2024-07-30

## 2024-08-02 ENCOUNTER — TRANSCRIPTION ENCOUNTER (OUTPATIENT)
Age: 89
End: 2024-08-02

## 2024-08-08 ENCOUNTER — TRANSCRIPTION ENCOUNTER (OUTPATIENT)
Age: 89
End: 2024-08-08

## 2024-08-15 ENCOUNTER — TRANSCRIPTION ENCOUNTER (OUTPATIENT)
Age: 89
End: 2024-08-15

## 2024-08-16 ENCOUNTER — APPOINTMENT (OUTPATIENT)
Dept: GERIATRICS | Facility: CLINIC | Age: 89
End: 2024-08-16
Payer: MEDICARE

## 2024-08-16 VITALS
HEART RATE: 50 BPM | DIASTOLIC BLOOD PRESSURE: 66 MMHG | TEMPERATURE: 98 F | OXYGEN SATURATION: 99 % | RESPIRATION RATE: 15 BRPM | SYSTOLIC BLOOD PRESSURE: 127 MMHG

## 2024-08-16 DIAGNOSIS — G47.00 INSOMNIA, UNSPECIFIED: ICD-10-CM

## 2024-08-16 DIAGNOSIS — S32.049A UNSPECIFIED FRACTURE OF FOURTH LUMBAR VERTEBRA, INITIAL ENCOUNTER FOR CLOSED FRACTURE: ICD-10-CM

## 2024-08-16 DIAGNOSIS — I10 ESSENTIAL (PRIMARY) HYPERTENSION: ICD-10-CM

## 2024-08-16 DIAGNOSIS — T78.40XA ALLERGY, UNSPECIFIED, INITIAL ENCOUNTER: ICD-10-CM

## 2024-08-16 DIAGNOSIS — K86.1 OTHER CHRONIC PANCREATITIS: ICD-10-CM

## 2024-08-16 DIAGNOSIS — A08.4 VIRAL INTESTINAL INFECTION, UNSPECIFIED: ICD-10-CM

## 2024-08-16 DIAGNOSIS — J06.9 ACUTE UPPER RESPIRATORY INFECTION, UNSPECIFIED: ICD-10-CM

## 2024-08-16 DIAGNOSIS — Z09 ENCOUNTER FOR FOLLOW-UP EXAMINATION AFTER COMPLETED TREATMENT FOR CONDITIONS OTHER THAN MALIGNANT NEOPLASM: ICD-10-CM

## 2024-08-16 PROCEDURE — 99204 OFFICE O/P NEW MOD 45 MIN: CPT

## 2024-08-16 RX ORDER — FEXOFENADINE HYDROCHLORIDE 180 MG/1
180 TABLET, FILM COATED ORAL DAILY
Qty: 60 | Refills: 1 | Status: ACTIVE | COMMUNITY
Start: 2024-08-16

## 2024-08-16 RX ORDER — ACETAMINOPHEN 500 MG/1
500 TABLET, COATED ORAL
Qty: 1 | Refills: 0 | Status: ACTIVE | COMMUNITY
Start: 2024-08-16

## 2024-08-16 RX ORDER — DICLOFENAC SODIUM 1 %
1 KIT TOPICAL
Qty: 1 | Refills: 0 | Status: ACTIVE | COMMUNITY
Start: 2024-08-16

## 2024-08-16 RX ORDER — LIDOCAINE 42 MG/1
4 PATCH PERCUTANEOUS; TOPICAL; TRANSDERMAL
Qty: 1 | Refills: 0 | Status: ACTIVE | COMMUNITY
Start: 2024-08-16

## 2024-08-18 PROBLEM — J06.9 VIRAL URI WITH COUGH: Status: ACTIVE | Noted: 2024-08-18 | Resolved: 2024-09-17

## 2024-08-18 PROBLEM — A08.4 VIRAL GASTROENTERITIS: Status: ACTIVE | Noted: 2024-08-18

## 2024-08-18 PROBLEM — S32.049A: Status: ACTIVE | Noted: 2024-08-16

## 2024-08-18 PROBLEM — Z09 HOSPITAL DISCHARGE FOLLOW-UP: Status: ACTIVE | Noted: 2024-08-18

## 2024-08-18 PROBLEM — T78.40XA ALLERGIES: Status: ACTIVE | Noted: 2024-08-16

## 2024-08-18 NOTE — HISTORY OF PRESENT ILLNESS
[No falls in past year] : Patient reported no falls in the past year [NO] : No [Little interest or pleasure doing things] : 1) Little interest or pleasure doing things [Feeling down, depressed, or hopeless] : 2) Feeling down, depressed, or hopeless [0] : 2) Feeling down, depressed, or hopeless: Not at all (0) [PHQ-2 Negative - No further assessment needed] : PHQ-2 Negative - No further assessment needed [FreeTextEntry1] : 89 y/o M with PMHx of dementia, BPH, gait instability, chronic pancreatitis, depression, HTN, insomnia, vitamin D deficiency, orthostasis, R rotator cuff arthropathy presents to the practice for a follow up visit.  Accompanied by daughter.  MANJINDER: Admitted to [facility] on 07/16/2024 and discharged on 07/19/2024. Admitted for viral URI and viral gastroenteritis, Tmax 100.5F in ED, did not meet sepsis criteria, CT head negative, CXR with mild atelectasis but no PNA or CHF. RVP and UA negative, s/p 2L IVF, s/p empiric abx, bcx ngtd. Had htn urgency, bp in the systolic 180's, they had increased losartan to 50 mg daily. Found incidentally on CT abdomen and pelvis, severe compression deformity of L2 vertebra, progressed from prior imaging, ortho spine was consulted with Dr. Yeager, managing with pain medication only. Added on lactobacillus for recurrent diarrhea. Discussed creon for pancreatic atrphy to help avoid diarrhea.   Diarrhea: As needed imodium. No dietary changes, Gets constipated taking imodium for 2-3 days consecutively. Takes meds in activia, creon for pancreatic insufficiency.  Gait instability: Referred to home PT, has ATC care from [facility] and son. Relying on wheelchair more.  Eye allergies: Looks like he is experiencing eye allergies in his L eye, causing it to be itchy, has clear discharge, no vision changes otherwise.  Dementia: Care programs [facility], [facility] from 10PM to 6AM. Goes to [facility]. Night sx have mostly abated since having the [facility] in place. Still focused on going to the bathroom despite not producing a lot of urine.  Insomnia: Mirtazapine 15 mg QHS, gabapentin 600 mg QHS, relaxium 1 tablet QHS, seroquel 12.5 mg QHS M-F and 25mg QHS on weekends, not falling asleep until 4AM and wakes up around 11AM. [[id number]] : 0

## 2024-08-18 NOTE — ASSESSMENT
[FreeTextEntry1] : RTC in 1 month for follow up visit.  Total time spent: 30 mins This includes chart review, patient assessment, discussion and collaboration with interdisciplinary team members, excluding time spent on separately billable services.

## 2024-08-18 NOTE — PHYSICAL EXAM
[Alert] : alert [No Acute Distress] : in no acute distress [Sclera] : the sclera and conjunctiva were normal [EOMI] : extraocular movements were intact [PERRL] : pupils were equal in size, round, and reactive to light [Normal Outer Ear/Nose] : the ears and nose were normal in appearance [Normal Appearance] : the appearance of the neck was normal [Supple] : the neck was supple [No Respiratory Distress] : no respiratory distress [No Acc Muscle Use] : no accessory muscle use [Respiration, Rhythm And Depth] : normal respiratory rhythm and effort [Auscultation Breath Sounds / Voice Sounds] : lungs were clear to auscultation bilaterally [Heart Rate And Rhythm] : heart rate was normal and rhythm regular [Bowel Sounds] : normal bowel sounds [Abdomen Tenderness] : non-tender [Abdomen Soft] : soft [No Spinal Tenderness] : no spinal tenderness [Normal Color / Pigmentation] : normal skin color and pigmentation [Normal Turgor] : normal skin turgor [No Focal Deficits] : no focal deficits [Normal Affect] : the affect was normal [Normal Mood] : the mood was normal [FreeTextEntry1] : Clear discharge from the L eye w/o conjunctival injection or obvious deformity noted

## 2024-08-18 NOTE — HISTORY OF PRESENT ILLNESS
[No falls in past year] : Patient reported no falls in the past year [NO] : No [Little interest or pleasure doing things] : 1) Little interest or pleasure doing things [Feeling down, depressed, or hopeless] : 2) Feeling down, depressed, or hopeless [0] : 2) Feeling down, depressed, or hopeless: Not at all (0) [PHQ-2 Negative - No further assessment needed] : PHQ-2 Negative - No further assessment needed [FreeTextEntry1] : 89 y/o M with PMHx of dementia, BPH, gait instability, chronic pancreatitis, depression, HTN, insomnia, vitamin D deficiency, orthostasis, R rotator cuff arthropathy presents to the practice for a follow up visit.  Accompanied by daughter.  MANJINDER: Admitted to [facility] on 07/16/2024 and discharged on 07/19/2024. Admitted for viral URI and viral gastroenteritis, Tmax 100.5F in ED, did not meet sepsis criteria, CT head negative, CXR with mild atelectasis but no PNA or CHF. RVP and UA negative, s/p 2L IVF, s/p empiric abx, bcx ngtd. Had htn urgency, bp in the systolic 180's, they had increased losartan to 50 mg daily. Found incidentally on CT abdomen and pelvis, severe compression deformity of L2 vertebra, progressed from prior imaging, ortho spine was consulted with Dr. Yeager, managing with pain medication only. Added on lactobacillus for recurrent diarrhea. Discussed creon for pancreatic atrphy to help avoid diarrhea.   Diarrhea: As needed imodium. No dietary changes, Gets constipated taking imodium for 2-3 days consecutively. Takes meds in activia, creon for pancreatic insufficiency.  Gait instability: Referred to home PT, has ATC care from HHA and son. Relying on wheelchair more.  Eye allergies: Looks like he is experiencing eye allergies in his L eye, causing it to be itchy, has clear discharge, no vision changes otherwise.  Dementia: Care programs TIW, HHHA from 10PM to 6AM. Goes to [employer] social program. Night sx have mostly abated since having the HHA in place. Still focused on going to the bathroom despite not producing a lot of urine.  Insomnia: Mirtazapine 15 mg QHS, gabapentin 600 mg QHS, relaxium 1 tablet QHS, seroquel 12.5 mg QHS M-F and 25mg QHS on weekends, not falling asleep until 4AM and wakes up around 11AM. [[id number]] : 0

## 2024-09-18 ENCOUNTER — TRANSCRIPTION ENCOUNTER (OUTPATIENT)
Age: 89
End: 2024-09-18

## 2024-09-18 ENCOUNTER — APPOINTMENT (OUTPATIENT)
Dept: GERIATRICS | Facility: CLINIC | Age: 89
End: 2024-09-18
Payer: MEDICARE

## 2024-09-18 VITALS
HEART RATE: 50 BPM | RESPIRATION RATE: 15 BRPM | DIASTOLIC BLOOD PRESSURE: 71 MMHG | SYSTOLIC BLOOD PRESSURE: 154 MMHG | TEMPERATURE: 98.4 F | OXYGEN SATURATION: 99 %

## 2024-09-18 VITALS — WEIGHT: 174 LBS | BODY MASS INDEX: 25.7 KG/M2

## 2024-09-18 VITALS — SYSTOLIC BLOOD PRESSURE: 130 MMHG | DIASTOLIC BLOOD PRESSURE: 70 MMHG

## 2024-09-18 DIAGNOSIS — K86.1 OTHER CHRONIC PANCREATITIS: ICD-10-CM

## 2024-09-18 DIAGNOSIS — F03.C0 UNSPECIFIED DEMENTIA, SEVERE, WITHOUT BEHAVIORAL DISTURBANCE, PSYCHOTIC DISTURBANCE, MOOD DISTURBANCE, AND ANXIETY: ICD-10-CM

## 2024-09-18 DIAGNOSIS — G47.00 INSOMNIA, UNSPECIFIED: ICD-10-CM

## 2024-09-18 DIAGNOSIS — Z71.89 OTHER SPECIFIED COUNSELING: ICD-10-CM

## 2024-09-18 DIAGNOSIS — R26.81 UNSTEADINESS ON FEET: ICD-10-CM

## 2024-09-18 PROCEDURE — G0008: CPT

## 2024-09-18 PROCEDURE — 99214 OFFICE O/P EST MOD 30 MIN: CPT

## 2024-09-18 PROCEDURE — 90662 IIV NO PRSV INCREASED AG IM: CPT

## 2024-09-18 PROCEDURE — G2211 COMPLEX E/M VISIT ADD ON: CPT

## 2024-09-19 RX ORDER — PANCRELIPASE 36000; 180000; 114000 [USP'U]/1; [USP'U]/1; [USP'U]/1
36000-114000 CAPSULE, DELAYED RELEASE PELLETS ORAL
Qty: 90 | Refills: 3 | Status: DISCONTINUED | COMMUNITY
Start: 2024-09-18 | End: 2024-09-19

## 2024-09-19 RX ORDER — GABAPENTIN 250 MG/5ML
250 SOLUTION ORAL AT BEDTIME
Qty: 360 | Refills: 3 | Status: ACTIVE | COMMUNITY
Start: 2024-09-19 | End: 1900-01-01

## 2024-09-19 NOTE — PHYSICAL EXAM
[Alert] : alert [Normal Outer Ear/Nose] : the ears and nose were normal in appearance [Both Tympanic Membranes Were Examined] : both tympanic membranes were normal [Normal Appearance] : the appearance of the neck was normal [Supple] : the neck was supple [No Respiratory Distress] : no respiratory distress [No Acc Muscle Use] : no accessory muscle use [Respiration, Rhythm And Depth] : normal respiratory rhythm and effort [Auscultation Breath Sounds / Voice Sounds] : lungs were clear to auscultation bilaterally [Normal S1, S2] : normal S1 and S2 [Heart Rate And Rhythm] : heart rate was normal and rhythm regular [Edema] : edema was not present [Bowel Sounds] : normal bowel sounds [Abdomen Tenderness] : non-tender [Abdomen Soft] : soft [Cervical Lymph Nodes Enlarged Posterior Bilaterally] : posterior cervical [Supraclavicular Lymph Nodes Enlarged Bilaterally] : supraclavicular [Cervical Lymph Nodes Enlarged Anterior Bilaterally] : anterior cervical, supraclavicular [Axillary Lymph Nodes Enlarged Bilaterally] : axillary [No CVA Tenderness] : no CVA  tenderness [No Spinal Tenderness] : no spinal tenderness [Motor Tone] : muscle strength and tone were normal [Normal Color / Pigmentation] : normal skin color and pigmentation [Normal Affect] : the affect was normal [Normal Mood] : the mood was normal [Normal Gait] : abnormal gait [Normal Insight/Judgment] : insight and judgment were not intact [de-identified] : well appearing; answers questions appropriately [FreeTextEntry1] : right eye patch in place

## 2024-09-19 NOTE — HISTORY OF PRESENT ILLNESS
[No falls in past year] : Patient reported no falls in the past year [Completely Dependent] : Completely dependent. [0] : 2) Feeling down, depressed, or hopeless: Not at all (0) [PHQ-2 Negative - No further assessment needed] : PHQ-2 Negative - No further assessment needed [With Patient/Caregiver] : , with patient/caregiver [I will adhere to the patient's wishes.] : I will adhere to the patient's wishes. [FreeTextEntry1] : JANET DALLAS is a 89-year-old man with a PMH of advanced dementia, HTN, insomnia, pancreatic insufficiency (taking Creon) and gait instability who presents for follow up visit. Patient is accompanied by daughter, Renetta, who provided collateral history.  Since last visit, patient continues to have intake Creon before meals, he has been having better diarrhea control.   He has been having worsening problems with swallowing and family has to open creon capsules.  They request to change Gabapentin to liquid form.   He gained 5lbs since last visit.   He continues to attend day care programs x 3 a week. They have hired a HHA at night from 10PM to 6AM. They have noticed pt has become more debilitated.   Insomnia: -continues -daughter states that patient sleeps throughout the night 1-2 times per week but more often than not, will not fall asleep until 4am and will then sleep until 11am  -currently taking mirtazapine 15mg, gabapentin 600mg at bedtime and relaxium x 1 tablet and Seroquel 12.5mg at night  -patient also has HHAs nightly from 10pm-6am  Leg weakness: -trying to go for walks with walker; daughter states that patient requires a lot of encouragement to engage in activity -patient went swimming recently at daughter's pool   Dysphagia: -occasionally coughing with fluids -unclear pneumonia in April was s/t aspiration -evaluated at bedside by SLP while in hospital but did not have a formal swallow evaluation  Dementia: -sundowning behaviors stable  - he is also taking Sertraline 25mg daily   [[id number]] : 0 [AdvancecareDate] : 05/22 [FreeTextEntry4] : daughter Renetta states that she has HCP form- she will obtain copy and bring to next visit\par  -no MOLST form completed; briefly discussed patient's wishes given recent hospitalization; Renetta states "I think he would wanted all aggressive measures as long as it were reversible but I am not sure and I need to check his living will"- plan to address at follow up visit

## 2024-09-19 NOTE — PHYSICAL EXAM
[Alert] : alert [Normal Outer Ear/Nose] : the ears and nose were normal in appearance [Both Tympanic Membranes Were Examined] : both tympanic membranes were normal [Normal Appearance] : the appearance of the neck was normal [Supple] : the neck was supple [No Respiratory Distress] : no respiratory distress [No Acc Muscle Use] : no accessory muscle use [Respiration, Rhythm And Depth] : normal respiratory rhythm and effort [Auscultation Breath Sounds / Voice Sounds] : lungs were clear to auscultation bilaterally [Normal S1, S2] : normal S1 and S2 [Heart Rate And Rhythm] : heart rate was normal and rhythm regular [Edema] : edema was not present [Bowel Sounds] : normal bowel sounds [Abdomen Tenderness] : non-tender [Abdomen Soft] : soft [Cervical Lymph Nodes Enlarged Posterior Bilaterally] : posterior cervical [Supraclavicular Lymph Nodes Enlarged Bilaterally] : supraclavicular [Cervical Lymph Nodes Enlarged Anterior Bilaterally] : anterior cervical, supraclavicular [Axillary Lymph Nodes Enlarged Bilaterally] : axillary [No CVA Tenderness] : no CVA  tenderness [No Spinal Tenderness] : no spinal tenderness [Motor Tone] : muscle strength and tone were normal [Normal Color / Pigmentation] : normal skin color and pigmentation [Normal Affect] : the affect was normal [Normal Mood] : the mood was normal [Normal Gait] : abnormal gait [Normal Insight/Judgment] : insight and judgment were not intact [de-identified] : well appearing; answers questions appropriately [FreeTextEntry1] : right eye patch in place

## 2024-09-19 NOTE — HISTORY OF PRESENT ILLNESS
[No falls in past year] : Patient reported no falls in the past year [Completely Dependent] : Completely dependent. [0] : 2) Feeling down, depressed, or hopeless: Not at all (0) [PHQ-2 Negative - No further assessment needed] : PHQ-2 Negative - No further assessment needed [With Patient/Caregiver] : , with patient/caregiver [I will adhere to the patient's wishes.] : I will adhere to the patient's wishes. [FreeTextEntry1] : JANET DALLAS is a 89-year-old man with a PMH of advanced dementia, HTN, insomnia, pancreatic insufficiency (taking Creon) and gait instability who presents for follow up visit. Patient is accompanied by daughter, Renetta, who provided collateral history.  Since last visit, patient continues to have intake Creon before meals, he has been having better diarrhea control.   He has been having worsening problems with swallowing and family has to open creon capsules.  They request to change Gabapentin to liquid form.   He gained 5lbs since last visit.   He continues to attend day care programs x 3 a week. They have hired a HHA at night from 10PM to 6AM. They have noticed pt has become more debilitated.   Insomnia: -continues -daughter states that patient sleeps throughout the night 1-2 times per week but more often than not, will not fall asleep until 4am and will then sleep until 11am  -currently taking mirtazapine 15mg, gabapentin 600mg at bedtime and relaxium x 1 tablet and Seroquel 12.5mg at night  -patient also has HHAs nightly from 10pm-6am  Leg weakness: -trying to go for walks with walker; daughter states that patient requires a lot of encouragement to engage in activity -patient went swimming recently at daughter's pool   Dysphagia: -occasionally coughing with fluids -unclear pneumonia in April was s/t aspiration -evaluated at bedside by SLP while in hospital but did not have a formal swallow evaluation  Dementia: -sundowning behaviors stable  - he is also taking Sertraline 25mg daily   [[id number]] : 0 [AdvancecareDate] : 05/22 [FreeTextEntry4] : daughter Renetta states that she has HCP form- she will obtain copy and bring to next visit\par  -no MOLST form completed; briefly discussed patient's wishes given recent hospitalization; Renetta states "I think he would wanted all aggressive measures as long as it were reversible but I am not sure and I need to check his living will"- plan to address at follow up visit

## 2024-09-20 ENCOUNTER — LABORATORY RESULT (OUTPATIENT)
Age: 89
End: 2024-09-20

## 2024-09-23 DIAGNOSIS — D50.9 IRON DEFICIENCY ANEMIA, UNSPECIFIED: ICD-10-CM

## 2024-09-23 RX ORDER — PANCRELIPASE 120000; 24000; 76000 [USP'U]/1; [USP'U]/1; [USP'U]/1
24000-76000 CAPSULE, DELAYED RELEASE PELLETS ORAL 4 TIMES DAILY
Qty: 240 | Refills: 3 | Status: ACTIVE | COMMUNITY
Start: 2024-09-19 | End: 1900-01-01

## 2024-09-23 NOTE — ED ADULT TRIAGE NOTE - HOW PATIENT ADDRESSED, PROFILE
Medication:  passed protocol.   Last office visit date: 09/03/24  Follow up: 10/07/24  Next appointment scheduled?: Yes   Number of refills given: 0   Guero

## 2024-10-25 NOTE — PHYSICAL THERAPY INITIAL EVALUATION ADULT - ADDITIONAL COMMENTS
As discussed your ear does not look infected today.  I recommend taking an over-the-counter oral decongestant such as Mucinex for sinus congestion.  Continue with your sinus rinses, Tylenol or ibuprofen.  Keep scheduled appointment with ENT for reevaluation.    Return to urgent care or emergency department for any worsening or concerning symptoms.  
pt unable to provide accurate Hx

## 2024-10-31 ENCOUNTER — INPATIENT (INPATIENT)
Facility: HOSPITAL | Age: 89
LOS: 5 days | Discharge: HOME HEALTH | DRG: 871 | End: 2024-11-06
Attending: STUDENT IN AN ORGANIZED HEALTH CARE EDUCATION/TRAINING PROGRAM | Admitting: STUDENT IN AN ORGANIZED HEALTH CARE EDUCATION/TRAINING PROGRAM
Payer: MEDICARE

## 2024-10-31 VITALS
TEMPERATURE: 104 F | HEART RATE: 122 BPM | WEIGHT: 183.2 LBS | SYSTOLIC BLOOD PRESSURE: 180 MMHG | RESPIRATION RATE: 20 BRPM | OXYGEN SATURATION: 96 % | DIASTOLIC BLOOD PRESSURE: 69 MMHG

## 2024-10-31 DIAGNOSIS — Z90.89 ACQUIRED ABSENCE OF OTHER ORGANS: Chronic | ICD-10-CM

## 2024-10-31 DIAGNOSIS — Z96.651 PRESENCE OF RIGHT ARTIFICIAL KNEE JOINT: Chronic | ICD-10-CM

## 2024-10-31 LAB
ALBUMIN SERPL ELPH-MCNC: 3.5 G/DL — SIGNIFICANT CHANGE UP (ref 3.3–5)
ALP SERPL-CCNC: 75 U/L — SIGNIFICANT CHANGE UP (ref 40–120)
ALT FLD-CCNC: 19 U/L — SIGNIFICANT CHANGE UP (ref 12–78)
ANION GAP SERPL CALC-SCNC: 3 MMOL/L — LOW (ref 5–17)
APTT BLD: 29.8 SEC — SIGNIFICANT CHANGE UP (ref 24.5–35.6)
AST SERPL-CCNC: 12 U/L — LOW (ref 15–37)
BASOPHILS # BLD AUTO: 0.03 K/UL — SIGNIFICANT CHANGE UP (ref 0–0.2)
BASOPHILS NFR BLD AUTO: 0.3 % — SIGNIFICANT CHANGE UP (ref 0–2)
BILIRUB SERPL-MCNC: 0.7 MG/DL — SIGNIFICANT CHANGE UP (ref 0.2–1.2)
BUN SERPL-MCNC: 34 MG/DL — HIGH (ref 7–23)
CALCIUM SERPL-MCNC: 9.1 MG/DL — SIGNIFICANT CHANGE UP (ref 8.5–10.1)
CHLORIDE SERPL-SCNC: 110 MMOL/L — HIGH (ref 96–108)
CO2 SERPL-SCNC: 25 MMOL/L — SIGNIFICANT CHANGE UP (ref 22–31)
CREAT SERPL-MCNC: 1.37 MG/DL — HIGH (ref 0.5–1.3)
EGFR: 48 ML/MIN/1.73M2 — LOW
EOSINOPHIL # BLD AUTO: 0.01 K/UL — SIGNIFICANT CHANGE UP (ref 0–0.5)
EOSINOPHIL NFR BLD AUTO: 0.1 % — SIGNIFICANT CHANGE UP (ref 0–6)
FLUAV AG NPH QL: SIGNIFICANT CHANGE UP
FLUBV AG NPH QL: SIGNIFICANT CHANGE UP
GLUCOSE SERPL-MCNC: 159 MG/DL — HIGH (ref 70–99)
HCT VFR BLD CALC: 37.8 % — LOW (ref 39–50)
HGB BLD-MCNC: 12.7 G/DL — LOW (ref 13–17)
IMM GRANULOCYTES NFR BLD AUTO: 0.4 % — SIGNIFICANT CHANGE UP (ref 0–0.9)
INR BLD: 1.03 RATIO — SIGNIFICANT CHANGE UP (ref 0.85–1.16)
LACTATE SERPL-SCNC: 2.2 MMOL/L — HIGH (ref 0.7–2)
LYMPHOCYTES # BLD AUTO: 0.36 K/UL — LOW (ref 1–3.3)
LYMPHOCYTES # BLD AUTO: 3.2 % — LOW (ref 13–44)
MACROCYTES BLD QL: SLIGHT — SIGNIFICANT CHANGE UP
MANUAL SMEAR VERIFICATION: SIGNIFICANT CHANGE UP
MCHC RBC-ENTMCNC: 33 PG — SIGNIFICANT CHANGE UP (ref 27–34)
MCHC RBC-ENTMCNC: 33.6 G/DL — SIGNIFICANT CHANGE UP (ref 32–36)
MCV RBC AUTO: 98.2 FL — SIGNIFICANT CHANGE UP (ref 80–100)
MONOCYTES # BLD AUTO: 1.04 K/UL — HIGH (ref 0–0.9)
MONOCYTES NFR BLD AUTO: 9.2 % — SIGNIFICANT CHANGE UP (ref 2–14)
NEUTROPHILS # BLD AUTO: 9.87 K/UL — HIGH (ref 1.8–7.4)
NEUTROPHILS NFR BLD AUTO: 86.8 % — HIGH (ref 43–77)
PLAT MORPH BLD: NORMAL — SIGNIFICANT CHANGE UP
PLATELET # BLD AUTO: 157 K/UL — SIGNIFICANT CHANGE UP (ref 150–400)
POTASSIUM SERPL-MCNC: 4.7 MMOL/L — SIGNIFICANT CHANGE UP (ref 3.5–5.3)
POTASSIUM SERPL-SCNC: 4.7 MMOL/L — SIGNIFICANT CHANGE UP (ref 3.5–5.3)
PROT SERPL-MCNC: 7.3 GM/DL — SIGNIFICANT CHANGE UP (ref 6–8.3)
PROTHROM AB SERPL-ACNC: 12.2 SEC — SIGNIFICANT CHANGE UP (ref 9.9–13.4)
RBC # BLD: 3.85 M/UL — LOW (ref 4.2–5.8)
RBC # FLD: 12.3 % — SIGNIFICANT CHANGE UP (ref 10.3–14.5)
RBC BLD AUTO: ABNORMAL
RSV RNA NPH QL NAA+NON-PROBE: SIGNIFICANT CHANGE UP
SARS-COV-2 RNA SPEC QL NAA+PROBE: SIGNIFICANT CHANGE UP
SODIUM SERPL-SCNC: 138 MMOL/L — SIGNIFICANT CHANGE UP (ref 135–145)
STOMATOCYTES BLD QL SMEAR: SLIGHT — SIGNIFICANT CHANGE UP
TOXIC GRANULES BLD QL SMEAR: PRESENT — SIGNIFICANT CHANGE UP
WBC # BLD: 11.36 K/UL — HIGH (ref 3.8–10.5)
WBC # FLD AUTO: 11.36 K/UL — HIGH (ref 3.8–10.5)

## 2024-10-31 PROCEDURE — 93010 ELECTROCARDIOGRAM REPORT: CPT

## 2024-10-31 PROCEDURE — 99285 EMERGENCY DEPT VISIT HI MDM: CPT

## 2024-10-31 PROCEDURE — 71045 X-RAY EXAM CHEST 1 VIEW: CPT | Mod: 26

## 2024-10-31 PROCEDURE — 71250 CT THORAX DX C-: CPT | Mod: 26,MC

## 2024-10-31 RX ORDER — CEFEPIME 2 G/1
2000 INJECTION, POWDER, FOR SOLUTION INTRAVENOUS ONCE
Refills: 0 | Status: DISCONTINUED | OUTPATIENT
Start: 2024-10-31 | End: 2024-10-31

## 2024-10-31 RX ORDER — SODIUM CHLORIDE 9 MG/ML
2600 INJECTION, SOLUTION INTRAMUSCULAR; INTRAVENOUS; SUBCUTANEOUS ONCE
Refills: 0 | Status: COMPLETED | OUTPATIENT
Start: 2024-10-31 | End: 2024-10-31

## 2024-10-31 RX ORDER — VANCOMYCIN HYDROCHLORIDE 50 MG/ML
1000 KIT ORAL ONCE
Refills: 0 | Status: COMPLETED | OUTPATIENT
Start: 2024-10-31 | End: 2024-10-31

## 2024-10-31 RX ORDER — CEFEPIME 2 G/1
2000 INJECTION, POWDER, FOR SOLUTION INTRAVENOUS ONCE
Refills: 0 | Status: COMPLETED | OUTPATIENT
Start: 2024-10-31 | End: 2024-10-31

## 2024-10-31 RX ADMIN — VANCOMYCIN HYDROCHLORIDE 250 MILLIGRAM(S): KIT at 23:00

## 2024-10-31 RX ADMIN — CEFEPIME 2000 MILLIGRAM(S): 2 INJECTION, POWDER, FOR SOLUTION INTRAVENOUS at 22:55

## 2024-10-31 RX ADMIN — SODIUM CHLORIDE 2600 MILLILITER(S): 9 INJECTION, SOLUTION INTRAMUSCULAR; INTRAVENOUS; SUBCUTANEOUS at 22:50

## 2024-10-31 NOTE — ED PROVIDER NOTE - CLINICAL SUMMARY MEDICAL DECISION MAKING FREE TEXT BOX
Pt with hx of dementia, HTN, R eye blindness, PNA in the past, coming in with fever and lethargy. Plan on labs, sepsis protocol, IVF, abx, XR, CT. Pt with hx of dementia, HTN, R eye blindness, PNA in the past, coming in with fever, shaking chills  and lethargy. Plan on labs, sepsis protocol, IVF, abx, XR, CT.

## 2024-10-31 NOTE — ED ADULT NURSE NOTE - CHIEF COMPLAINT QUOTE
Pt BIBEMS from home c/o fever, increased AMS, weakness, "shakiness" starting a few hours ago. PMHx dementia, pt A&Ox0-1 at baseline. Pt currently A&Ox0. Daughter at bedside denies changes in urination. Per daughter pt has not had a bowel movement in x3 days and started having a wet cough yesterday.  in triage. Pt sent for EKG.

## 2024-10-31 NOTE — ED ADULT NURSE NOTE - OBJECTIVE STATEMENT
Pt received on stretcher Alert, answering very few questions with a simple "yes", as per pt's daughter pt has become more and more disoriented and speaking ess since yesterday, pt has history of dementia, pt usually gets around via walker or wheelchair, pt is febrile rectally upon arrival. Pt placed on a cardiac monitor, labs drawn.

## 2024-10-31 NOTE — ED PROVIDER NOTE - OBJECTIVE STATEMENT
89 y/o M with PMHx of dementia,  HTN, R eye blindness, who lives with his wife and his son presents to the ED BIB daughter c/o cough that developed yesterday but was more alert than usual. Today she was called by the brother that the pt was very lethargic with increased cough. Pt has hx of PNA in the past. 89 y/o M with PMHx of dementia,  HTN, R eye blindness, who lives with his wife and his son presents to the ED BIB daughter c/o cough that developed yesterday but was more alert than usual. Today she was called by the brother that the pt was very lethargic with increased cough.She noted that pt was warm and was shaking.  Pt has hx of PNA in the past. pt goes daily to day program at [facility]. 89 y/o M with PMHx of dementia,  HTN, R eye blindness, who lives with his wife and his son presents to the ED BIB daughter c/o cough that developed yesterday but was more alert than usual. Today she was called by the brother that the pt was very lethargic with increased cough.She noted that pt was warm and was shaking.  Pt has hx of PNA in the past. pt goes daily to day program at [facility].Nonsmoker nondrinker no drugs

## 2024-10-31 NOTE — ED ADULT NURSE NOTE - SUICIDE SCREENING QUESTION 1
n/v/d x 24 hours.  vomiting x 8 , watery diarrhea > 10.  Lower abd pain this am.   lmp- current.  pmd- none.  pt works at the [employer] where reisdents and staffs have similar symptoms.   no other compliant. Patient unable to complete n/v/d x 24 hours.  vomiting x 8 , watery diarrhea > 10.  Lower abd pain this am.   lmp- current.  pmd- none.  pt works at the [employer] where reisdents and staffs have similar symptoms.   no other compliant.  Last episode of n/v/d was prior to ED arrrival.

## 2024-10-31 NOTE — ED PROVIDER NOTE - CONSTITUTIONAL, MLM
normal... Lying with eyes closed, appears flushed, opens eyes at times Lying with eyes closed, appears flushed, opens eyes at times. Answers some questions when directed by daughter

## 2024-10-31 NOTE — ED ADULT TRIAGE NOTE - CHIEF COMPLAINT QUOTE
Pt BIBEMS from home c/o fever, increased AMS, weakness, "shakiness" starting a few hours ago. PMHx dementia, pt A&Ox0-1 at baseline. Pt currently A&Ox0. Daughter at bedside denies cough, changes in urination. Per daughter pt has not had a bowel movement in x3 days.  in triage. Pt sent for EKG. Pt BIBEMS from home c/o fever, increased AMS, weakness, "shakiness" starting a few hours ago. PMHx dementia, pt A&Ox0-1 at baseline. Pt currently A&Ox0. Daughter at bedside changes in urination. Per daughter pt has not had a bowel movement in x3 days and started having a wet cough yesterday.  in triage. Pt sent for EKG. Pt BIBEMS from home c/o fever, increased AMS, weakness, "shakiness" starting a few hours ago. PMHx dementia, pt A&Ox0-1 at baseline. Pt currently A&Ox0. Daughter at bedside denies changes in urination. Per daughter pt has not had a bowel movement in x3 days and started having a wet cough yesterday.  in triage. Pt sent for EKG.

## 2024-11-01 DIAGNOSIS — J18.9 PNEUMONIA, UNSPECIFIED ORGANISM: ICD-10-CM

## 2024-11-01 LAB
ANION GAP SERPL CALC-SCNC: 1 MMOL/L — LOW (ref 5–17)
APPEARANCE UR: CLEAR — SIGNIFICANT CHANGE UP
BACTERIA # UR AUTO: NEGATIVE /HPF — SIGNIFICANT CHANGE UP
BILIRUB UR-MCNC: NEGATIVE — SIGNIFICANT CHANGE UP
BUN SERPL-MCNC: 31 MG/DL — HIGH (ref 7–23)
CALCIUM SERPL-MCNC: 8.2 MG/DL — LOW (ref 8.5–10.1)
CAST: 1 /LPF — SIGNIFICANT CHANGE UP (ref 0–4)
CHLORIDE SERPL-SCNC: 116 MMOL/L — HIGH (ref 96–108)
CO2 SERPL-SCNC: 25 MMOL/L — SIGNIFICANT CHANGE UP (ref 22–31)
COLOR SPEC: YELLOW — SIGNIFICANT CHANGE UP
CREAT SERPL-MCNC: 1.26 MG/DL — SIGNIFICANT CHANGE UP (ref 0.5–1.3)
DIFF PNL FLD: ABNORMAL
EGFR: 53 ML/MIN/1.73M2 — LOW
GLUCOSE SERPL-MCNC: 136 MG/DL — HIGH (ref 70–99)
GLUCOSE UR QL: NEGATIVE MG/DL — SIGNIFICANT CHANGE UP
HCT VFR BLD CALC: 30.9 % — LOW (ref 39–50)
HGB BLD-MCNC: 10.2 G/DL — LOW (ref 13–17)
KETONES UR-MCNC: NEGATIVE MG/DL — SIGNIFICANT CHANGE UP
LACTATE SERPL-SCNC: 1.6 MMOL/L — SIGNIFICANT CHANGE UP (ref 0.7–2)
LEUKOCYTE ESTERASE UR-ACNC: NEGATIVE — SIGNIFICANT CHANGE UP
MCHC RBC-ENTMCNC: 32.8 PG — SIGNIFICANT CHANGE UP (ref 27–34)
MCHC RBC-ENTMCNC: 33 G/DL — SIGNIFICANT CHANGE UP (ref 32–36)
MCV RBC AUTO: 99.4 FL — SIGNIFICANT CHANGE UP (ref 80–100)
NITRITE UR-MCNC: NEGATIVE — SIGNIFICANT CHANGE UP
PH UR: 7 — SIGNIFICANT CHANGE UP (ref 5–8)
PLATELET # BLD AUTO: 127 K/UL — LOW (ref 150–400)
POTASSIUM SERPL-MCNC: 4.2 MMOL/L — SIGNIFICANT CHANGE UP (ref 3.5–5.3)
POTASSIUM SERPL-SCNC: 4.2 MMOL/L — SIGNIFICANT CHANGE UP (ref 3.5–5.3)
PROT UR-MCNC: NEGATIVE MG/DL — SIGNIFICANT CHANGE UP
RBC # BLD: 3.11 M/UL — LOW (ref 4.2–5.8)
RBC # FLD: 12.7 % — SIGNIFICANT CHANGE UP (ref 10.3–14.5)
RBC CASTS # UR COMP ASSIST: 2 /HPF — SIGNIFICANT CHANGE UP (ref 0–4)
SODIUM SERPL-SCNC: 142 MMOL/L — SIGNIFICANT CHANGE UP (ref 135–145)
SP GR SPEC: 1.01 — SIGNIFICANT CHANGE UP (ref 1–1.03)
SQUAMOUS # UR AUTO: 0 /HPF — SIGNIFICANT CHANGE UP (ref 0–5)
UROBILINOGEN FLD QL: 0.2 MG/DL — SIGNIFICANT CHANGE UP (ref 0.2–1)
WBC # BLD: 12.61 K/UL — HIGH (ref 3.8–10.5)
WBC # FLD AUTO: 12.61 K/UL — HIGH (ref 3.8–10.5)
WBC UR QL: 2 /HPF — SIGNIFICANT CHANGE UP (ref 0–5)

## 2024-11-01 PROCEDURE — 80048 BASIC METABOLIC PNL TOTAL CA: CPT

## 2024-11-01 PROCEDURE — 83605 ASSAY OF LACTIC ACID: CPT

## 2024-11-01 PROCEDURE — 97530 THERAPEUTIC ACTIVITIES: CPT | Mod: GP

## 2024-11-01 PROCEDURE — 97116 GAIT TRAINING THERAPY: CPT | Mod: GP

## 2024-11-01 PROCEDURE — 99223 1ST HOSP IP/OBS HIGH 75: CPT

## 2024-11-01 PROCEDURE — 87641 MR-STAPH DNA AMP PROBE: CPT

## 2024-11-01 PROCEDURE — 87449 NOS EACH ORGANISM AG IA: CPT

## 2024-11-01 PROCEDURE — 87899 AGENT NOS ASSAY W/OPTIC: CPT

## 2024-11-01 PROCEDURE — 36415 COLL VENOUS BLD VENIPUNCTURE: CPT

## 2024-11-01 PROCEDURE — 87640 STAPH A DNA AMP PROBE: CPT

## 2024-11-01 PROCEDURE — 85027 COMPLETE CBC AUTOMATED: CPT

## 2024-11-01 PROCEDURE — 97162 PT EVAL MOD COMPLEX 30 MIN: CPT | Mod: GP

## 2024-11-01 RX ORDER — MAGNESIUM, ALUMINUM HYDROXIDE 200-200 MG
30 TABLET,CHEWABLE ORAL EVERY 4 HOURS
Refills: 0 | Status: DISCONTINUED | OUTPATIENT
Start: 2024-11-01 | End: 2024-11-06

## 2024-11-01 RX ORDER — OXYBUTYNIN CHLORIDE 5 MG/1
5 TABLET ORAL
Refills: 0 | Status: DISCONTINUED | OUTPATIENT
Start: 2024-11-01 | End: 2024-11-03

## 2024-11-01 RX ORDER — DOXYCYCLINE HYCLATE 100 MG/1
100 TABLET, FILM COATED ORAL EVERY 12 HOURS
Refills: 0 | Status: DISCONTINUED | OUTPATIENT
Start: 2024-11-01 | End: 2024-11-02

## 2024-11-01 RX ORDER — SENNA 187 MG
2 TABLET ORAL AT BEDTIME
Refills: 0 | Status: DISCONTINUED | OUTPATIENT
Start: 2024-11-01 | End: 2024-11-06

## 2024-11-01 RX ORDER — ONDANSETRON HYDROCHLORIDE 2 MG/ML
4 INJECTION, SOLUTION INTRAMUSCULAR; INTRAVENOUS EVERY 8 HOURS
Refills: 0 | Status: DISCONTINUED | OUTPATIENT
Start: 2024-11-01 | End: 2024-11-06

## 2024-11-01 RX ORDER — LOSARTAN POTASSIUM 25 MG/1
1 TABLET ORAL
Refills: 0 | DISCHARGE

## 2024-11-01 RX ORDER — ACETAMINOPHEN 500 MG
650 TABLET ORAL EVERY 6 HOURS
Refills: 0 | Status: DISCONTINUED | OUTPATIENT
Start: 2024-11-01 | End: 2024-11-06

## 2024-11-01 RX ORDER — LACTOBACILLUS ACIDOPHILUS 25MM CELL
1 CAPSULE ORAL DAILY
Refills: 0 | Status: DISCONTINUED | OUTPATIENT
Start: 2024-11-01 | End: 2024-11-06

## 2024-11-01 RX ORDER — PANCRELIPASE 16800; 98400; 56800 [USP'U]/1; [USP'U]/1; [USP'U]/1
4 CAPSULE, DELAYED RELEASE ORAL
Refills: 0 | Status: DISCONTINUED | OUTPATIENT
Start: 2024-11-01 | End: 2024-11-06

## 2024-11-01 RX ORDER — LOSARTAN POTASSIUM 25 MG/1
50 TABLET ORAL DAILY
Refills: 0 | Status: DISCONTINUED | OUTPATIENT
Start: 2024-11-01 | End: 2024-11-06

## 2024-11-01 RX ORDER — CEFEPIME 2 G/1
1000 INJECTION, POWDER, FOR SOLUTION INTRAVENOUS EVERY 12 HOURS
Refills: 0 | Status: DISCONTINUED | OUTPATIENT
Start: 2024-11-01 | End: 2024-11-06

## 2024-11-01 RX ORDER — VANCOMYCIN HYDROCHLORIDE 50 MG/ML
1000 KIT ORAL EVERY 12 HOURS
Refills: 0 | Status: DISCONTINUED | OUTPATIENT
Start: 2024-11-01 | End: 2024-11-01

## 2024-11-01 RX ORDER — POLYETHYLENE GLYCOL 3350 17 G/17G
17 POWDER, FOR SOLUTION ORAL
Refills: 0 | Status: DISCONTINUED | OUTPATIENT
Start: 2024-11-01 | End: 2024-11-06

## 2024-11-01 RX ORDER — MELATONIN 5 MG
3 TABLET ORAL AT BEDTIME
Refills: 0 | Status: DISCONTINUED | OUTPATIENT
Start: 2024-11-01 | End: 2024-11-06

## 2024-11-01 RX ORDER — SERTRALINE HYDROCHLORIDE 50 MG/1
25 TABLET, FILM COATED ORAL DAILY
Refills: 0 | Status: DISCONTINUED | OUTPATIENT
Start: 2024-11-01 | End: 2024-11-06

## 2024-11-01 RX ORDER — PIPERACILLIN AND TAZOBACTAM .5; 4 G/20ML; G/20ML
3.38 INJECTION, POWDER, LYOPHILIZED, FOR SOLUTION INTRAVENOUS EVERY 8 HOURS
Refills: 0 | Status: DISCONTINUED | OUTPATIENT
Start: 2024-11-01 | End: 2024-11-01

## 2024-11-01 RX ORDER — HEPARIN SODIUM 10000 [USP'U]/ML
5000 INJECTION INTRAVENOUS; SUBCUTANEOUS EVERY 8 HOURS
Refills: 0 | Status: DISCONTINUED | OUTPATIENT
Start: 2024-11-01 | End: 2024-11-06

## 2024-11-01 RX ORDER — CEFEPIME 2 G/1
1000 INJECTION, POWDER, FOR SOLUTION INTRAVENOUS EVERY 12 HOURS
Refills: 0 | Status: DISCONTINUED | OUTPATIENT
Start: 2024-11-01 | End: 2024-11-01

## 2024-11-01 RX ORDER — ACETAMINOPHEN 500 MG
1000 TABLET ORAL ONCE
Refills: 0 | Status: COMPLETED | OUTPATIENT
Start: 2024-11-01 | End: 2024-11-01

## 2024-11-01 RX ORDER — GABAPENTIN 300 MG/1
12 CAPSULE ORAL
Refills: 0 | DISCHARGE

## 2024-11-01 RX ORDER — GABAPENTIN 300 MG/1
600 CAPSULE ORAL AT BEDTIME
Refills: 0 | Status: DISCONTINUED | OUTPATIENT
Start: 2024-11-01 | End: 2024-11-06

## 2024-11-01 RX ORDER — MIRTAZAPINE 30 MG/1
15 TABLET ORAL AT BEDTIME
Refills: 0 | Status: DISCONTINUED | OUTPATIENT
Start: 2024-11-01 | End: 2024-11-06

## 2024-11-01 RX ORDER — DEXTROMETHORPHAN HBR. AND GUAIFENESIN 20; 200 MG/10ML; MG/10ML
10 SOLUTION ORAL EVERY 4 HOURS
Refills: 0 | Status: DISCONTINUED | OUTPATIENT
Start: 2024-11-01 | End: 2024-11-06

## 2024-11-01 RX ADMIN — GABAPENTIN 600 MILLIGRAM(S): 300 CAPSULE ORAL at 22:23

## 2024-11-01 RX ADMIN — OXYBUTYNIN CHLORIDE 5 MILLIGRAM(S): 5 TABLET ORAL at 22:24

## 2024-11-01 RX ADMIN — PANCRELIPASE 4 CAPSULE(S): 16800; 98400; 56800 CAPSULE, DELAYED RELEASE ORAL at 17:54

## 2024-11-01 RX ADMIN — MIRTAZAPINE 15 MILLIGRAM(S): 30 TABLET ORAL at 22:24

## 2024-11-01 RX ADMIN — HEPARIN SODIUM 5000 UNIT(S): 10000 INJECTION INTRAVENOUS; SUBCUTANEOUS at 14:51

## 2024-11-01 RX ADMIN — PANCRELIPASE 4 CAPSULE(S): 16800; 98400; 56800 CAPSULE, DELAYED RELEASE ORAL at 13:41

## 2024-11-01 RX ADMIN — Medication 2 TABLET(S): at 22:24

## 2024-11-01 RX ADMIN — Medication 400 MILLIGRAM(S): at 00:30

## 2024-11-01 RX ADMIN — SERTRALINE HYDROCHLORIDE 25 MILLIGRAM(S): 50 TABLET, FILM COATED ORAL at 11:06

## 2024-11-01 RX ADMIN — HEPARIN SODIUM 5000 UNIT(S): 10000 INJECTION INTRAVENOUS; SUBCUTANEOUS at 06:24

## 2024-11-01 RX ADMIN — LOSARTAN POTASSIUM 50 MILLIGRAM(S): 25 TABLET ORAL at 11:10

## 2024-11-01 RX ADMIN — POLYETHYLENE GLYCOL 3350 17 GRAM(S): 17 POWDER, FOR SOLUTION ORAL at 22:24

## 2024-11-01 RX ADMIN — CEFEPIME 1000 MILLIGRAM(S): 2 INJECTION, POWDER, FOR SOLUTION INTRAVENOUS at 22:24

## 2024-11-01 RX ADMIN — Medication 1000 MILLILITER(S): at 03:33

## 2024-11-01 RX ADMIN — Medication 1 TABLET(S): at 11:06

## 2024-11-01 RX ADMIN — Medication 100 MILLILITER(S): at 06:54

## 2024-11-01 RX ADMIN — DOXYCYCLINE HYCLATE 100 MILLIGRAM(S): 100 TABLET, FILM COATED ORAL at 22:24

## 2024-11-01 RX ADMIN — OXYBUTYNIN CHLORIDE 5 MILLIGRAM(S): 5 TABLET ORAL at 11:06

## 2024-11-01 RX ADMIN — PIPERACILLIN AND TAZOBACTAM 25 GRAM(S): .5; 4 INJECTION, POWDER, LYOPHILIZED, FOR SOLUTION INTRAVENOUS at 06:24

## 2024-11-01 RX ADMIN — HEPARIN SODIUM 5000 UNIT(S): 10000 INJECTION INTRAVENOUS; SUBCUTANEOUS at 22:24

## 2024-11-01 NOTE — PATIENT PROFILE ADULT - FALL HARM RISK - HARM RISK INTERVENTIONS
Assistance with ambulation/Assistance OOB with selected safe patient handling equipment/Communicate Risk of Fall with Harm to all staff/Discuss with provider need for PT consult/Monitor for mental status changes/Monitor gait and stability/Move patient closer to nurses' station/Provide patient with walking aids - walker, cane, crutches/Reinforce activity limits and safety measures with patient and family/Reorient to person, place and time as needed/Tailored Fall Risk Interventions/Toileting schedule using arm’s reach rule for commode and bathroom/Use of alarms - bed, chair and/or voice tab/Visual Cue: Yellow wristband and red socks/Bed in lowest position, wheels locked, appropriate side rails in place/Call bell, personal items and telephone in reach/Instruct patient to call for assistance before getting out of bed or chair/Non-slip footwear when patient is out of bed/[place] to call system/Physically safe environment - no spills, clutter or unnecessary equipment/Purposeful Proactive Rounding/Room/bathroom lighting operational, light cord in reach

## 2024-11-01 NOTE — H&P ADULT - NSHPPHYSICALEXAM_GEN_ALL_CORE
PHYSICAL EXAM:  GENERAL: NAD, lying in bed comfortably  HEAD:  Atraumatic, Normocephalic  EYES: EOMI, PERRLA, conjunctiva and sclera clear.  Right eye blindness status.  ENT: Moist mucous membranes  NECK: Supple, No JVD  CHEST/LUNG: Clear to auscultation bilaterally; No rales, rhonchi, wheezing, or rubs. Unlabored respirations  HEART: Regular rate and rhythm; No murmurs, rubs, or gallops  ABDOMEN: Bowel sounds present; Soft, Nontender, Nondistended. No hepatomegaly  EXTREMITIES:  2+ Peripheral Pulses, brisk capillary refill. No clubbing, cyanosis, or edema  NERVOUS SYSTEM:  Alert & Oriented X2, speech clear. No deficits   MSK: FROM all 4 extremities, full and equal strength  SKIN: No rashes or lesions

## 2024-11-01 NOTE — DIETITIAN INITIAL EVALUATION ADULT - ADD RECOMMEND
1. C/w current diet to optimize nutritional status  2. Add ensure plus high protein BID to optimize PO intake (provides 350 kcal, 20g protein/ shake)   3. MVI w/ minerals daily to ensure 100% RDA met  4. Consider adding thiamine 100 mg daily 2/2 poor PO intake/ malnutrition  5. Monitor bowel movements, if no BM for >3 days, consider implementing bowel regimen.  6. Monitor daily lytes/min and replete prn  7. Encourage protein-rich foods, maximize food preferences   8. Obtain weekly wt to track/trend changes  9. Confirm goals of care regarding nutrition support. Nutrition support is not recommended due to overall declining medical status which evidenced based studies indicate EN is not effective in prolonging survival and improving quality of life. It can also increase risk of aspiration pneumonia as well as other related issues (infection, GI complications, and worsening/ non-healing PI's). However, will provide nutrition/ hydration within GOC.   RD will continue to monitor PO intake, labs, hydration, and wt prn.

## 2024-11-01 NOTE — H&P ADULT - CONVERSATION DETAILS
Called daughter Ms Gutierrez over phone [phone], and she confirmed DNR and DNI status. [facility]

## 2024-11-01 NOTE — PHYSICAL THERAPY INITIAL EVALUATION ADULT - ADDITIONAL COMMENTS
Has a HHA over night.  Son assists with all care. Patient is a minimal Household Ambulator with RW and assist.  Goes to [facility] Day care via the bus.

## 2024-11-01 NOTE — DIETITIAN INITIAL EVALUATION ADULT - PERTINENT MEDS FT
MEDICATIONS  (STANDING):  cefepime  Injectable. 1000 milliGRAM(s) IV Push every 12 hours  doxycycline monohydrate Capsule 100 milliGRAM(s) Oral every 12 hours  gabapentin 600 milliGRAM(s) Oral at bedtime  heparin   Injectable 5000 Unit(s) SubCutaneous every 8 hours  lactated ringers. 1000 milliLiter(s) (100 mL/Hr) IV Continuous <Continuous>  lactobacillus acidophilus 1 Tablet(s) Oral daily  losartan 50 milliGRAM(s) Oral daily  mirtazapine 15 milliGRAM(s) Oral at bedtime  oxybutynin 5 milliGRAM(s) Oral two times a day  pancrelipase  (CREON 12,000 Lipase Units) 4 Capsule(s) Oral three times a day with meals  sertraline 25 milliGRAM(s) Oral daily    MEDICATIONS  (PRN):  acetaminophen     Tablet .. 650 milliGRAM(s) Oral every 6 hours PRN Temp greater or equal to 38C (100.4F), Mild Pain (1 - 3)  aluminum hydroxide/magnesium hydroxide/simethicone Suspension 30 milliLiter(s) Oral every 4 hours PRN Dyspepsia  guaifenesin/dextromethorphan Oral Liquid 10 milliLiter(s) Oral every 4 hours PRN Cough  melatonin 3 milliGRAM(s) Oral at bedtime PRN Insomnia  ondansetron Injectable 4 milliGRAM(s) IV Push every 8 hours PRN Nausea and/or Vomiting

## 2024-11-01 NOTE — PHARMACOTHERAPY INTERVENTION NOTE - COMMENTS
Medication history complete, reviewed medication with patients son Guero at [phone] and confirmed with [facility].

## 2024-11-01 NOTE — DIETITIAN INITIAL EVALUATION ADULT - OTHER INFO
The patient is a 89y Male with significant PMH of dementia,  HTN, R eye blindness, anxiety, depression, BPH, who lives with his wife and his son presented to the ED by daughter with c/o cough that developed yesterday but was more alert than usual. Patient is very poor historian due to underlying dementia. Most of the information has been obtained from EMR documentation. in d/w ED provider and daughter on phone.  Per daughter, patient was more lethargic with chills and rigor and his body was warn than his baseline. Reportedly, patient goes daily to day program at [facility]. HCAP of both lower lobes; AMA on CKD. noted with sepsis and pneumonia. Admitting diagnosis: HCAP    Pt known to nutr services previously met critereia fo PCM in the past; continues to meetPt unarousable at time of RD visit; unable to obtain information. RD obtained bed scale wt 174# on 11/1. Wt hx reviewed: 178# on 4/8/24 (taken by RD). EMR wt doc'd at 180# on 7/18/24. 6# wt loss /3.3% x ~ 3 months; not clin sig. NFPE reveals mild-mod muscle/fat wasting. C/w current diet to optimize nutritional status. Will add ensure plus high protein BID to optimize PO intake (provides 350 kcal, 20g protein/ shake). Please see additional recommendations below.

## 2024-11-01 NOTE — PATIENT PROFILE ADULT - FUNCTIONAL ASSESSMENT - BASIC MOBILITY 6.
2-calculated by average/Not able to assess (calculate score using [facility] averaging method)

## 2024-11-01 NOTE — CONSULT NOTE ADULT - SUBJECTIVE AND OBJECTIVE BOX
Patient is a 89y old  Male who presents with a chief complaint of Sepsis due to HCAP (01 Nov 2024 02:55)      89y Male with significant PMH of dementia,  HTN, R eye blindness, anxiety, depression, BPH, who lives with his wife and his son presented to the ED by daughter with c/o cough that developed day prior to admit, but was more alert than usual. Patient is very poor historian due to underlying dementia. Most of the information has been obtained from EMR documentation. in d/w ED provider and daughter on phone.  Per daughter, patient was more lethargic with chills and rigor and his body was warn than his baseline. Reportedly, patient goes daily to day program at [facility].  He is comfortably laying in his bed and no obvious distress. CT chest shows: IMPRESSION: Patchy densities at the lung bases may reflect atelectasis, edema or pneumonitis. Sig labs: Leukocytosis of 11.36k with N 87% and elevated lactate level 2.2 which came down to 1.6 after hydration. BUN 34, Cr 1.37 (was 1.28 on 07/19/2024).  UA and RVP negative. Vancomycin and Cefepime IV given in ED after blood culture draw    PMH: as above  PSH: as above  Meds: per reconciliation sheet, noted below  MEDICATIONS  (STANDING):  gabapentin 600 milliGRAM(s) Oral at bedtime  heparin   Injectable 5000 Unit(s) SubCutaneous every 8 hours  lactated ringers. 1000 milliLiter(s) (100 mL/Hr) IV Continuous <Continuous>  lactobacillus acidophilus 1 Tablet(s) Oral daily  losartan 50 milliGRAM(s) Oral daily  mirtazapine 15 milliGRAM(s) Oral at bedtime  oxybutynin 5 milliGRAM(s) Oral two times a day  pancrelipase  (CREON 12,000 Lipase Units) 4 Capsule(s) Oral three times a day with meals  piperacillin/tazobactam IVPB.. 3.375 Gram(s) IV Intermittent every 8 hours  sertraline 25 milliGRAM(s) Oral daily      Allergies    No Known Allergies    Intolerances      Social: no smoking, no alcohol, no illegal drugs; no recent travel, no exposure to TB  FAMILY HISTORY:  No pertinent family history       no history of premature cardiovascular disease in first degree relatives    ROS: the patient denies fever, no chills, no HA, no dizziness, no sore throat, no blurry vision, no CP, no palpitations, no SOB, no cough, no abdominal pain, no diarrhea, no N/V, no dysuria, no leg pain, no claudication, no rash, no joint aches, no rectal pain or bleeding, no night sweats    All other systems reviewed and are negative    Vital Signs Last 24 Hrs  T(C): 36.6 (01 Nov 2024 07:54), Max: 39.8 (31 Oct 2024 20:20)  T(F): 97.8 (01 Nov 2024 07:54), Max: 103.6 (31 Oct 2024 20:20)  HR: 74 (01 Nov 2024 07:54) (72 - 122)  BP: 111/60 (01 Nov 2024 07:54) (76/61 - 180/69)  BP(mean): 70 (01 Nov 2024 04:29) (59 - 99)  RR: 18 (01 Nov 2024 07:54) (17 - 24)  SpO2: 97% (01 Nov 2024 07:54) (92% - 97%)    Parameters below as of 01 Nov 2024 07:54  Patient On (Oxygen Delivery Method): room air      Daily     Daily     PE:  Constitutional: NAD  HEENT: NC/AT, EOMI, PERRLA, conjunctivae clear; ears and nose atraumatic; pharynx benign  Neck: supple; thyroid not palpable  Back: no tenderness  Respiratory: decreased breath sounds rhonchi   Cardiovascular: S1S2 regular, no murmurs  Abdomen: soft, not tender, not distended, positive BS; liver and spleen WNL  Genitourinary: no suprapubic tenderness  Lymphatic: no LN palpable  Musculoskeletal: no muscle tenderness, no joint swelling or tenderness  Extremities: no pedal edema  Neurological/ Psychiatric:  moving all extremities  Skin: no rashes; no palpable lesions    Labs: all available labs reviewed                        10.2   12.61 )-----------( 127      ( 01 Nov 2024 04:59 )             30.9     11-01    142  |  116[H]  |  31[H]  ----------------------------<  136[H]  4.2   |  25  |  1.26    Ca    8.2[L]      01 Nov 2024 04:59    TPro  7.3  /  Alb  3.5  /  TBili  0.7  /  DBili  x   /  AST  12[L]  /  ALT  19  /  AlkPhos  75  10-31     LIVER FUNCTIONS - ( 31 Oct 2024 22:02 )  Alb: 3.5 g/dL / Pro: 7.3 gm/dL / ALK PHOS: 75 U/L / ALT: 19 U/L / AST: 12 U/L / GGT: x           Urinalysis Basic - ( 01 Nov 2024 04:59 )    Color: x / Appearance: x / SG: x / pH: x  Gluc: 136 mg/dL / Ketone: x  / Bili: x / Urobili: x   Blood: x / Protein: x / Nitrite: x   Leuk Esterase: x / RBC: x / WBC x   Sq Epi: x / Non Sq Epi: x / Bacteria: x          Radiology: all available radiological tests reviewed  < from: CT Chest No Cont (10.31.24 @ 23:07) >    ACC: [id number] EXAM:  CT CHEST   ORDERED BY: ANA MOJICA     PROCEDURE DATE:  10/31/2024          INTERPRETATION:  EXAMINATION: CT CHEST    CLINICAL INDICATION: Cough.    TECHNIQUE: Noncontrast CT of the chest was obtained.    COMPARISON:4/25/2022.    FINDINGS:    AIRWAYS AND LUNGS: Tracheal secretions.  Patchy and linear bilateral   lower lobe opacities.    MEDIASTINUM AND PLEURA: There are no enlarged mediastinal, hilar or   axillary lymph nodes. The visualized portion of the thyroid gland is   unremarkable. There is no pleural effusion. There is no pneumothorax.    HEART AND VESSELS: There is mild cardiomegaly.  There are atherosclerotic   calcifications of the aorta and coronary arteries.  There is no   pericardial effusion.Aortic valve calcification.    UPPER ABDOMEN: Images of the upper abdomen demonstrate cholelithiasis.   Hepatic cyst.    BONES AND SOFT TISSUES: The bones are unremarkable.  The soft tissues are   unremarkable.      IMPRESSION:  Findings likely represent bibasilar atelectasis and superimposed   infectious/inflammatory process. Short-term follow-up CT needed for   complete evaluation.      Advanced directives addressed: full resuscitation Patient is a 89y old  Male who presents with a chief complaint of Sepsis due to HCAP (01 Nov 2024 02:55)      89y Male with significant PMH of dementia,  HTN, R eye blindness, anxiety, depression, BPH, who lives with his wife and his son presented to the ED by daughter with c/o cough that developed day prior to admit, but was more alert than usual. Patient is very poor historian due to underlying dementia. Most of the information has been obtained from EMR documentation. in d/w ED provider and daughter on phone.  Per daughter, patient was more lethargic with chills and rigor and his body was warn than his baseline. Reportedly, patient goes daily to day program at [facility].  He is comfortably laying in his bed and no obvious distress. CT chest shows: IMPRESSION: Patchy densities at the lung bases may reflect atelectasis, edema or pneumonitis. Sig labs: Leukocytosis of 11.36k with N 87% and elevated lactate level 2.2 which came down to 1.6 after hydration. BUN 34, Cr 1.37 (was 1.28 on 07/19/2024).  UA and RVP negative. Vancomycin and Cefepime IV given in ED after blood culture draw    PMH: as above  PSH: as above  Meds: per reconciliation sheet, noted below  MEDICATIONS  (STANDING):  gabapentin 600 milliGRAM(s) Oral at bedtime  heparin   Injectable 5000 Unit(s) SubCutaneous every 8 hours  lactated ringers. 1000 milliLiter(s) (100 mL/Hr) IV Continuous <Continuous>  lactobacillus acidophilus 1 Tablet(s) Oral daily  losartan 50 milliGRAM(s) Oral daily  mirtazapine 15 milliGRAM(s) Oral at bedtime  oxybutynin 5 milliGRAM(s) Oral two times a day  pancrelipase  (CREON 12,000 Lipase Units) 4 Capsule(s) Oral three times a day with meals  piperacillin/tazobactam IVPB.. 3.375 Gram(s) IV Intermittent every 8 hours  sertraline 25 milliGRAM(s) Oral daily      Allergies    No Known Allergies    Intolerances      Social: no smoking, no alcohol, no illegal drugs; no recent travel, no exposure to TB  FAMILY HISTORY:  No pertinent family history       no history of premature cardiovascular disease in first degree relatives    ROS: the patient denies fever, no chills, no HA, no dizziness, no sore throat, no blurry vision, no CP, no palpitations, + SOB, +cough, no abdominal pain, no diarrhea, no N/V, no dysuria, no leg pain, no claudication, no rash, no joint aches, no rectal pain or bleeding, no night sweats    All other systems reviewed and are negative    Vital Signs Last 24 Hrs  T(C): 36.6 (01 Nov 2024 07:54), Max: 39.8 (31 Oct 2024 20:20)  T(F): 97.8 (01 Nov 2024 07:54), Max: 103.6 (31 Oct 2024 20:20)  HR: 74 (01 Nov 2024 07:54) (72 - 122)  BP: 111/60 (01 Nov 2024 07:54) (76/61 - 180/69)  BP(mean): 70 (01 Nov 2024 04:29) (59 - 99)  RR: 18 (01 Nov 2024 07:54) (17 - 24)  SpO2: 97% (01 Nov 2024 07:54) (92% - 97%)    Parameters below as of 01 Nov 2024 07:54  Patient On (Oxygen Delivery Method): room air      Daily     Daily     PE:  Constitutional: NAD  HEENT: NC/AT, EOMI, PERRLA, conjunctivae clear; ears and nose atraumatic; pharynx benign  Neck: supple; thyroid not palpable  Back: no tenderness  Respiratory: decreased breath sounds rhonchi   Cardiovascular: S1S2 regular, no murmurs  Abdomen: soft, not tender, not distended, positive BS; liver and spleen WNL  Genitourinary: no suprapubic tenderness  Lymphatic: no LN palpable  Musculoskeletal: no muscle tenderness, no joint swelling or tenderness  Extremities: no pedal edema  Neurological/ Psychiatric:  moving all extremities  Skin: no rashes; no palpable lesions    Labs: all available labs reviewed                        10.2   12.61 )-----------( 127      ( 01 Nov 2024 04:59 )             30.9     11-01    142  |  116[H]  |  31[H]  ----------------------------<  136[H]  4.2   |  25  |  1.26    Ca    8.2[L]      01 Nov 2024 04:59    TPro  7.3  /  Alb  3.5  /  TBili  0.7  /  DBili  x   /  AST  12[L]  /  ALT  19  /  AlkPhos  75  10-31     LIVER FUNCTIONS - ( 31 Oct 2024 22:02 )  Alb: 3.5 g/dL / Pro: 7.3 gm/dL / ALK PHOS: 75 U/L / ALT: 19 U/L / AST: 12 U/L / GGT: x           Urinalysis Basic - ( 01 Nov 2024 04:59 )    Color: x / Appearance: x / SG: x / pH: x  Gluc: 136 mg/dL / Ketone: x  / Bili: x / Urobili: x   Blood: x / Protein: x / Nitrite: x   Leuk Esterase: x / RBC: x / WBC x   Sq Epi: x / Non Sq Epi: x / Bacteria: x          Radiology: all available radiological tests reviewed  < from: CT Chest No Cont (10.31.24 @ 23:07) >    ACC: [id number] EXAM:  CT CHEST   ORDERED BY: ANA MOJICA     PROCEDURE DATE:  10/31/2024          INTERPRETATION:  EXAMINATION: CT CHEST    CLINICAL INDICATION: Cough.    TECHNIQUE: Noncontrast CT of the chest was obtained.    COMPARISON:4/25/2022.    FINDINGS:    AIRWAYS AND LUNGS: Tracheal secretions.  Patchy and linear bilateral   lower lobe opacities.    MEDIASTINUM AND PLEURA: There are no enlarged mediastinal, hilar or   axillary lymph nodes. The visualized portion of the thyroid gland is   unremarkable. There is no pleural effusion. There is no pneumothorax.    HEART AND VESSELS: There is mild cardiomegaly.  There are atherosclerotic   calcifications of the aorta and coronary arteries.  There is no   pericardial effusion.Aortic valve calcification.    UPPER ABDOMEN: Images of the upper abdomen demonstrate cholelithiasis.   Hepatic cyst.    BONES AND SOFT TISSUES: The bones are unremarkable.  The soft tissues are   unremarkable.      IMPRESSION:  Findings likely represent bibasilar atelectasis and superimposed   infectious/inflammatory process. Short-term follow-up CT needed for   complete evaluation.      Advanced directives addressed: full resuscitation

## 2024-11-01 NOTE — PHYSICAL THERAPY INITIAL EVALUATION ADULT - ACTIVE RANGE OF MOTION EXAMINATION, REHAB EVAL
B shoulders to 90*/bilateral upper extremity Active ROM was WFL (within functional limits)/deficits as listed below

## 2024-11-01 NOTE — CONSULT NOTE ADULT - ASSESSMENT
89y Male with significant PMH of dementia,  HTN, R eye blindness, anxiety, depression, BPH, who lives with his wife and his son presented to the ED by daughter with c/o cough that developed day prior to admit, but was more alert than usual. Patient is very poor historian due to underlying dementia. Most of the information has been obtained from EMR documentation. in d/w ED provider and daughter on phone.  Per daughter, patient was more lethargic with chills and rigor and his body was warn than his baseline. Reportedly, patient goes daily to day program at [facility].  He is comfortably laying in his bed and no obvious distress. CT chest shows: IMPRESSION: Patchy densities at the lung bases may reflect atelectasis, edema or pneumonitis. Sig labs: Leukocytosis of 11.36k with N 87% and elevated lactate level 2.2 which came down to 1.6 after hydration. BUN 34, Cr 1.37 (was 1.28 on 07/19/2024).  UA and RVP negative. Vancomycin and Cefepime IV given in ED after blood culture draw    1. Sepsis. Cough. Pneumonia. Alzheimers dementia  - imaging reviewed  - dc zosy, start cefepime [id number]  - dc vancomycin change to doxycycline 100mg bid  - continue with antibiotic coverage  - monitor temps  - tolerating abx well so far; no side effects noted  - reason for abx use and side effects reviewed with patient  - supportive care  - fu cbc  - f/u cultures      Clinical team may change from intravenous to oral antibiotics when the following criteria are met:   1. Patient is clinically improving/stable       a)	Improved signs and symptoms of infection from initial presentation       b)	Afebrile for 24 hours       c)	Leukocytosis trending towards normal range   2. Patient is tolerating oral intake   3. Initial/repeat blood cultures are negative    When above criteria met  may change iv antibiotics to: po doxy 5 days, po ceftin x 7 days total include abx given here        89y Male with significant PMH of dementia,  HTN, R eye blindness, anxiety, depression, BPH, who lives with his wife and his son presented to the ED by daughter with c/o cough that developed day prior to admit, but was more alert than usual. Patient is very poor historian due to underlying dementia. Most of the information has been obtained from EMR documentation. in d/w ED provider and daughter on phone.  Per daughter, patient was more lethargic with chills and rigor and his body was warn than his baseline. Reportedly, patient goes daily to day program at [facility].  He is comfortably laying in his bed and no obvious distress. CT chest shows: IMPRESSION: Patchy densities at the lung bases may reflect atelectasis, edema or pneumonitis. Sig labs: Leukocytosis of 11.36k with N 87% and elevated lactate level 2.2 which came down to 1.6 after hydration. BUN 34, Cr 1.37 (was 1.28 on 07/19/2024).  UA and RVP negative. Vancomycin and Cefepime IV given in ED after blood culture draw    1. Sepsis. Cough. Pneumonia. Alzheimers dementia. CKD  - imaging reviewed  - dc zosy, start cefepime [id number]  - dc vancomycin change to doxycycline 100mg bid  - continue with antibiotic coverage  - monitor temps  - tolerating abx well so far; no side effects noted  - reason for abx use and side effects reviewed with patient  - supportive care  - fu cbc  - f/u cultures      Clinical team may change from intravenous to oral antibiotics when the following criteria are met:   1. Patient is clinically improving/stable       a)	Improved signs and symptoms of infection from initial presentation       b)	Afebrile for 24 hours       c)	Leukocytosis trending towards normal range   2. Patient is tolerating oral intake   3. Initial/repeat blood cultures are negative    When above criteria met  may change iv antibiotics to: po doxy 5 days, po ceftin x 7 days total include abx given here

## 2024-11-01 NOTE — ED ADULT NURSE REASSESSMENT NOTE - NS ED NURSE REASSESS COMMENT FT1
Pt is speaking at this time, answering questions ( name and ) , pt now A&Ox2, transport taking pt up to inpt bed.
Pt's aleksandar Whitaker-[phone]
Incontinent care done, no BM seen, skin is intact.

## 2024-11-01 NOTE — PHYSICAL THERAPY INITIAL EVALUATION ADULT - LEVEL OF INDEPENDENCE: SUPINE/SIT, REHAB EVAL
moderate assist (50% patients effort)/maximum assist (25% patients effort)
Implemented All Universal Safety Interventions:  [place] to call system. Call bell, personal items and telephone within reach. Instruct patient to call for assistance. Room bathroom lighting operational. Non-slip footwear when patient is off stretcher. Physically safe environment: no spills, clutter or unnecessary equipment. Stretcher in lowest position, wheels locked, appropriate side rails in place.

## 2024-11-01 NOTE — PHYSICAL THERAPY INITIAL EVALUATION ADULT - MODALITIES TREATMENT COMMENTS
Patient left in chair with CBIR and chair alarm active. Son at bedside, refusing YARELI and home physical therapy. "I can do that". KATHLEEN, RN informed of session/status.

## 2024-11-01 NOTE — PROGRESS NOTE ADULT - ASSESSMENT
· Assessment	  The patient is a 89y Male with significant PMH of dementia,  HTN, R eye blindness, anxiety, depression, BPH, who lives with his wife and his son presented to the ED by daughter with c/o cough that developed yesterday but was more alert than usual. Patient is very poor historian due to underlying dementia. Most of the information has been obtained from EMR documentation. in d/w ED provider and daughter on phone.  Per daughter, patient was more lethargic with chills and rigor and his body was warn than his baseline. Reportedly, patient goes daily to day program at [facility].     # HCAP of both lower lobes Lt > Rt.  -Leukocytosis with left shift. Elevated lactate level, now resolved.  -Admit to medical floor.  -Follow blood and sputum cultures.  -S/p vanc zosyn   -Tylenol and Robitussin prn.  -ID consulted appreciate recs   -trend fever, wbc curve   -start cefepime 1g q12 hrs and doxy 100 mg bid     # CKD-stage IIIa.  -Check BMP daily     # HTN .  -On Losartan.    # Anxiety and depression.  -C/w his Sertraline and Mirtazapine.    # OA.  -On Gabapentin.    # H/o BPH and urinary incontinence.  -C/q his Solifenacin->Oxybutynin.    # DVT prophylaxis: Heparin sq.     · Assessment	  The patient is a 89y Male with significant PMH of dementia,  HTN, R eye blindness, anxiety, depression, BPH, who lives with his wife and his son presented to the ED by daughter with c/o cough that developed yesterday but was more alert than usual. Patient is very poor historian due to underlying dementia. Most of the information has been obtained from EMR documentation. in d/w ED provider and daughter on phone.  Per daughter, patient was more lethargic with chills and rigor and his body was warn than his baseline. Reportedly, patient goes daily to day program at [facility].     # HCAP of both lower lobes Lt > Rt.  -Leukocytosis with left shift. Elevated lactate level, now resolved.  -Admit to medical floor.  -Follow blood and sputum cultures.  -S/p vanc zosyn   -Tylenol and Robitussin prn.  -ID consulted appreciate recs   -trend fever, wbc curve   -start cefepime 1g q12 hrs and doxy 100 mg bid     # CKD-stage IIIa.  -Check BMP daily     # HTN .  -On Losartan.    # Anxiety and depression.  -C/w his Sertraline and Mirtazapine.    # OA.  -On Gabapentin.    # H/o BPH and urinary incontinence.  -C/q his Solifenacin->Oxybutynin.    # DVT prophylaxis: Heparin sq.  #JONNATHANK Sid Frazier

## 2024-11-01 NOTE — PROGRESS NOTE ADULT - SUBJECTIVE AND OBJECTIVE BOX
HOSPITALIST ATTENDING PROGRESS NOTE    Chart and meds reviewed.  Patient seen and examined.    CC: fever    Subjective: NAEO     All other systems reviewed and found to be negative with the exception of what has been described above.    MEDICATIONS  (STANDING):  cefepime  Injectable. 1000 milliGRAM(s) IV Push every 12 hours  doxycycline monohydrate Capsule 100 milliGRAM(s) Oral every 12 hours  gabapentin 600 milliGRAM(s) Oral at bedtime  heparin   Injectable 5000 Unit(s) SubCutaneous every 8 hours  lactated ringers. 1000 milliLiter(s) (100 mL/Hr) IV Continuous <Continuous>  lactobacillus acidophilus 1 Tablet(s) Oral daily  losartan 50 milliGRAM(s) Oral daily  mirtazapine 15 milliGRAM(s) Oral at bedtime  oxybutynin 5 milliGRAM(s) Oral two times a day  pancrelipase  (CREON 12,000 Lipase Units) 4 Capsule(s) Oral three times a day with meals  sertraline 25 milliGRAM(s) Oral daily    MEDICATIONS  (PRN):  acetaminophen     Tablet .. 650 milliGRAM(s) Oral every 6 hours PRN Temp greater or equal to 38C (100.4F), Mild Pain (1 - 3)  aluminum hydroxide/magnesium hydroxide/simethicone Suspension 30 milliLiter(s) Oral every 4 hours PRN Dyspepsia  guaifenesin/dextromethorphan Oral Liquid 10 milliLiter(s) Oral every 4 hours PRN Cough  melatonin 3 milliGRAM(s) Oral at bedtime PRN Insomnia  ondansetron Injectable 4 milliGRAM(s) IV Push every 8 hours PRN Nausea and/or Vomiting      VITALS:  T(F): 97.8 (11-01-24 @ 07:54), Max: 103.6 (10-31-24 @ 20:20)  HR: 74 (11-01-24 @ 07:54) (72 - 122)  BP: 111/60 (11-01-24 @ 07:54) (76/61 - 180/69)  RR: 18 (11-01-24 @ 07:54) (17 - 24)  SpO2: 97% (11-01-24 @ 07:54) (92% - 97%)  Wt(kg): --    I&O's Summary      CAPILLARY BLOOD GLUCOSE      POCT Blood Glucose.: 179 mg/dL (31 Oct 2024 20:19)      PHYSICAL EXAM:  Gen: NAD  HEENT:  pupils equal and reactive, EOMI, no oropharyngeal lesions, erythema, exudates, oral thrush  NECK:   supple, no carotid bruits, no palpable lymph nodes, no thyromegaly  CV:  +S1, +S2, regular, no murmurs or rubs  RESP:   lungs clear to auscultation bilaterally, no wheezing, rales, rhonchi, good air entry bilaterally  BREAST:  not examined  GI:  abdomen soft, non-tender, non-distended, normal BS, no bruits, no abdominal masses, no palpable masses  RECTAL:  not examined  :  not examined  MSK:   normal muscle tone, no atrophy, no rigidity, no contractions  EXT:  no clubbing, no cyanosis, no edema, no calf pain, swelling or erythema  VASCULAR:  pulses equal and symmetric in the upper and lower extremities  NEURO:  AAOX1-2, no focal neurological deficits  SKIN:  no ulcers, lesions or rashes    LABS:                            10.2   12.61 )-----------( 127      ( 01 Nov 2024 04:59 )             30.9     11-01    142  |  116[H]  |  31[H]  ----------------------------<  136[H]  4.2   |  25  |  1.26    Ca    8.2[L]      01 Nov 2024 04:59    TPro  7.3  /  Alb  3.5  /  TBili  0.7  /  DBili  x   /  AST  12[L]  /  ALT  19  /  AlkPhos  75  10-31        LIVER FUNCTIONS - ( 31 Oct 2024 22:02 )  Alb: 3.5 g/dL / Pro: 7.3 gm/dL / ALK PHOS: 75 U/L / ALT: 19 U/L / AST: 12 U/L / GGT: x           PT/INR - ( 31 Oct 2024 22:02 )   PT: 12.2 sec;   INR: 1.03 ratio         PTT - ( 31 Oct 2024 22:02 )  PTT:29.8 sec  Urinalysis Basic - ( 01 Nov 2024 04:59 )    Color: x / Appearance: x / SG: x / pH: x  Gluc: 136 mg/dL / Ketone: x  / Bili: x / Urobili: x   Blood: x / Protein: x / Nitrite: x   Leuk Esterase: x / RBC: x / WBC x   Sq Epi: x / Non Sq Epi: x / Bacteria: x        Lactate, Blood: 1.6 mmol/L (11-01 @ 00:59)  Lactate, Blood: 2.2 mmol/L (10-31 @ 22:02)        CULTURES:  Blood, Urine: Non Hemolyzed Trace (11-01 @ 00:02)       HOSPITALIST ATTENDING PROGRESS NOTE    Chart and meds reviewed.  Patient seen and examined.    CC: fever    Subjective: NAEO. Spoke to son Guero and updated him he reported pt has not had bm in 2-3 days at home will start bowel regimen    All other systems reviewed and found to be negative with the exception of what has been described above.    MEDICATIONS  (STANDING):  cefepime  Injectable. 1000 milliGRAM(s) IV Push every 12 hours  doxycycline monohydrate Capsule 100 milliGRAM(s) Oral every 12 hours  gabapentin 600 milliGRAM(s) Oral at bedtime  heparin   Injectable 5000 Unit(s) SubCutaneous every 8 hours  lactated ringers. 1000 milliLiter(s) (100 mL/Hr) IV Continuous <Continuous>  lactobacillus acidophilus 1 Tablet(s) Oral daily  losartan 50 milliGRAM(s) Oral daily  mirtazapine 15 milliGRAM(s) Oral at bedtime  oxybutynin 5 milliGRAM(s) Oral two times a day  pancrelipase  (CREON 12,000 Lipase Units) 4 Capsule(s) Oral three times a day with meals  sertraline 25 milliGRAM(s) Oral daily    MEDICATIONS  (PRN):  acetaminophen     Tablet .. 650 milliGRAM(s) Oral every 6 hours PRN Temp greater or equal to 38C (100.4F), Mild Pain (1 - 3)  aluminum hydroxide/magnesium hydroxide/simethicone Suspension 30 milliLiter(s) Oral every 4 hours PRN Dyspepsia  guaifenesin/dextromethorphan Oral Liquid 10 milliLiter(s) Oral every 4 hours PRN Cough  melatonin 3 milliGRAM(s) Oral at bedtime PRN Insomnia  ondansetron Injectable 4 milliGRAM(s) IV Push every 8 hours PRN Nausea and/or Vomiting      VITALS:  T(F): 97.8 (11-01-24 @ 07:54), Max: 103.6 (10-31-24 @ 20:20)  HR: 74 (11-01-24 @ 07:54) (72 - 122)  BP: 111/60 (11-01-24 @ 07:54) (76/61 - 180/69)  RR: 18 (11-01-24 @ 07:54) (17 - 24)  SpO2: 97% (11-01-24 @ 07:54) (92% - 97%)  Wt(kg): --    I&O's Summary      CAPILLARY BLOOD GLUCOSE      POCT Blood Glucose.: 179 mg/dL (31 Oct 2024 20:19)      PHYSICAL EXAM:  Gen: NAD  HEENT:  pupils equal and reactive, EOMI, no oropharyngeal lesions, erythema, exudates, oral thrush  NECK:   supple, no carotid bruits, no palpable lymph nodes, no thyromegaly  CV:  +S1, +S2, regular, no murmurs or rubs  RESP:   lungs clear to auscultation bilaterally, no wheezing, rales, rhonchi, good air entry bilaterally  BREAST:  not examined  GI:  abdomen soft, non-tender, non-distended, normal BS, no bruits, no abdominal masses, no palpable masses  RECTAL:  not examined  :  not examined  MSK:   normal muscle tone, no atrophy, no rigidity, no contractions  EXT:  no clubbing, no cyanosis, no edema, no calf pain, swelling or erythema  VASCULAR:  pulses equal and symmetric in the upper and lower extremities  NEURO:  AAOX1-2, no focal neurological deficits  SKIN:  no ulcers, lesions or rashes    LABS:                            10.2   12.61 )-----------( 127      ( 01 Nov 2024 04:59 )             30.9     11-01    142  |  116[H]  |  31[H]  ----------------------------<  136[H]  4.2   |  25  |  1.26    Ca    8.2[L]      01 Nov 2024 04:59    TPro  7.3  /  Alb  3.5  /  TBili  0.7  /  DBili  x   /  AST  12[L]  /  ALT  19  /  AlkPhos  75  10-31        LIVER FUNCTIONS - ( 31 Oct 2024 22:02 )  Alb: 3.5 g/dL / Pro: 7.3 gm/dL / ALK PHOS: 75 U/L / ALT: 19 U/L / AST: 12 U/L / GGT: x           PT/INR - ( 31 Oct 2024 22:02 )   PT: 12.2 sec;   INR: 1.03 ratio         PTT - ( 31 Oct 2024 22:02 )  PTT:29.8 sec  Urinalysis Basic - ( 01 Nov 2024 04:59 )    Color: x / Appearance: x / SG: x / pH: x  Gluc: 136 mg/dL / Ketone: x  / Bili: x / Urobili: x   Blood: x / Protein: x / Nitrite: x   Leuk Esterase: x / RBC: x / WBC x   Sq Epi: x / Non Sq Epi: x / Bacteria: x        Lactate, Blood: 1.6 mmol/L (11-01 @ 00:59)  Lactate, Blood: 2.2 mmol/L (10-31 @ 22:02)        CULTURES:  Blood, Urine: Non Hemolyzed Trace (11-01 @ 00:02)

## 2024-11-01 NOTE — DIETITIAN INITIAL EVALUATION ADULT - PERTINENT LABORATORY DATA
11-01    142  |  116[H]  |  31[H]  ----------------------------<  136[H]  4.2   |  25  |  1.26    Ca    8.2[L]      01 Nov 2024 04:59    TPro  7.3  /  Alb  3.5  /  TBili  0.7  /  DBili  x   /  AST  12[L]  /  ALT  19  /  AlkPhos  75  10-31  POCT Blood Glucose.: 179 mg/dL (10-31-24 @ 20:19)  A1C with Estimated Average Glucose Result: 5.9 % (04-07-24 @ 09:11)

## 2024-11-01 NOTE — H&P ADULT - NSHPLABSRESULTS_GEN_ALL_CORE
LABS:                        12.7   11.36 )-----------( 157      ( 31 Oct 2024 22:02 )             37.8     10-31    138  |  110[H]  |  34[H]  ----------------------------<  159[H]  4.7   |  25  |  1.37[H]    Ca    9.1      31 Oct 2024 22:02    TPro  7.3  /  Alb  3.5  /  TBili  0.7  /  DBili  x   /  AST  12[L]  /  ALT  19  /  AlkPhos  75  10-31    PT/INR - ( 31 Oct 2024 22:02 )   PT: 12.2 sec;   INR: 1.03 ratio         PTT - ( 31 Oct 2024 22:02 )  PTT:29.8 sec        < from: CT Chest No Cont (10.31.24 @ 23:07) >      IMPRESSION:  Patchy densities at the lung bases may reflect atelectasis, edema or  pneumonitis.    < end of copied text >

## 2024-11-01 NOTE — PHYSICAL THERAPY INITIAL EVALUATION ADULT - FOLLOWS COMMANDS/ANSWERS QUESTIONS, REHAB EVAL
with repetition and verbal. tactile cues. 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

## 2024-11-01 NOTE — PHYSICAL THERAPY INITIAL EVALUATION ADULT - NSACTIVITYREC_GEN_A_PT
Maximal Assistance Lift, such as Letty, Loli Flex or Loli Stedy, is recommended for OOB transfers for safe staff and patient handling. Heavy transfer manually.

## 2024-11-01 NOTE — H&P ADULT - HISTORY OF PRESENT ILLNESS
Chief Complaint: fever.    · Chief Complaint: The patient is a 89y Male complaining of fever.  · HPI Objective Statement: 89 y/o M with PMHx of dementia,  HTN, R eye blindness, who lives with his wife and his son presents to the ED BIB daughter c/o cough that developed yesterday but was more alert than usual. Today she was called by the brother that the pt was very lethargic with increased cough.She noted that pt was warm and was shaking.  Pt has hx of PNA in the past. pt goes daily to day program at [facility].      with PMHx of anxiety, dementia, depression, HLD, HTN, OA, BPH, who presented to  ED from home Chief Complaint: fever.    The patient is a 89y Male with significant PMH of dementia,  HTN, R eye blindness, anxiety, depression, BPH, who lives with his wife and his son presents to the ED BIB daughter c/o cough that developed yesterday but was more alert than usual. Today she was called by the brother that the pt was very lethargic with increased cough.She noted that pt was warm and was shaking.  Pt has hx of PNA in the past. pt goes daily to day program at [facility].      with PMHx of anxiety, dementia, depression, HLD, HTN, OA, BPH, who presented to  ED from home Chief Complaint: fever.    The patient is a 89y Male with significant PMH of dementia,  HTN, R eye blindness, anxiety, depression, BPH, who lives with his wife and his son presented to the ED by daughter with c/o cough that developed yesterday but was more alert than usual. Patient is very poor historian due to underlying dementia. Most of the information has been obtained from EMR documentation. in d/w ED provider and daughter on phone.  Per daughter, patient was more lethargic with chills and rigor and his body was warn than his baseline. Reportedly, patient goes daily to day program at [facility].   He is comfortably laying in his bed and no obvious distress.    CT chest shows: IMPRESSION: Patchy densities at the lung bases may reflect atelectasis, edema or pneumonitis.    Sig labs: Leukocytosis of 11.36k with N 87% and elevated lactate level 2.2 which came down to 1.6 after hydration. BUN 34, Cr 1.37 (was 1.28 on 07/19/2024).   UA and RVP negative.    Vancomycin and Cefepime IV given in ED after blood culture draw.

## 2024-11-01 NOTE — PHYSICAL THERAPY INITIAL EVALUATION ADULT - PERTINENT HX OF CURRENT PROBLEM, REHAB EVAL
89y Male with significant PMH of dementia,  HTN, R eye blindness, anxiety, depression, BPH, who lives with his wife and his son presented to the ED by daughter with c/o cough that developed yesterday but was more than usual. CT chest: PNA

## 2024-11-01 NOTE — H&P ADULT - ASSESSMENT
The patient is a 89y Male with significant PMH of dementia,  HTN, R eye blindness, anxiety, depression, BPH, who lives with his wife and his son presented to the ED by daughter with c/o cough that developed yesterday but was more alert than usual. Patient is very poor historian due to underlying dementia. Most of the information has been obtained from EMR documentation. in d/w ED provider and daughter on phone.  Per daughter, patient was more lethargic with chills and rigor and his body was warn than his baseline. Reportedly, patient goes daily to day program at [facility].     # HCAP of both lower lobes Lt > Rt.  -Leukocytosis with left shift. Elevated lactate level, now resolved.  -Admit to medical floor.  -Maintenance IV fluids.  -Follow blood and sputum cultures.  -Vancomycin and Zosyn empirically for now.  -Tylenol and Robitussin prn.  -ID consult placed.    # AMA on CKD-stage IIIa.  -Cr 1.37 (was 1.28 on 07/19/2024).  -Check BMP in am.    # HTN .  -On Losartan.    # Anxiety and depression.  -C/w his Sertraline and Mirtazapine.    # OA.  -On Gabapentin.    # H/o BPH and urinary incontinence.  -C/q his Solifenacin->Oxybutynin.    # DVT prophylaxis: Heparin sq.    DNR and DNI with Trial of NIV.  MOLST updated.

## 2024-11-02 LAB
ANION GAP SERPL CALC-SCNC: 1 MMOL/L — LOW (ref 5–17)
BUN SERPL-MCNC: 33 MG/DL — HIGH (ref 7–23)
CALCIUM SERPL-MCNC: 8.9 MG/DL — SIGNIFICANT CHANGE UP (ref 8.5–10.1)
CHLORIDE SERPL-SCNC: 117 MMOL/L — HIGH (ref 96–108)
CO2 SERPL-SCNC: 27 MMOL/L — SIGNIFICANT CHANGE UP (ref 22–31)
CREAT SERPL-MCNC: 1.15 MG/DL — SIGNIFICANT CHANGE UP (ref 0.5–1.3)
EGFR: 59 ML/MIN/1.73M2 — LOW
GLUCOSE SERPL-MCNC: 107 MG/DL — HIGH (ref 70–99)
HCT VFR BLD CALC: 32.1 % — LOW (ref 39–50)
HGB BLD-MCNC: 10.6 G/DL — LOW (ref 13–17)
LEGIONELLA AG UR QL: NEGATIVE — SIGNIFICANT CHANGE UP
MCHC RBC-ENTMCNC: 32.9 PG — SIGNIFICANT CHANGE UP (ref 27–34)
MCHC RBC-ENTMCNC: 33 G/DL — SIGNIFICANT CHANGE UP (ref 32–36)
MCV RBC AUTO: 99.7 FL — SIGNIFICANT CHANGE UP (ref 80–100)
MRSA PCR RESULT.: SIGNIFICANT CHANGE UP
PLATELET # BLD AUTO: 118 K/UL — LOW (ref 150–400)
POTASSIUM SERPL-MCNC: 4.1 MMOL/L — SIGNIFICANT CHANGE UP (ref 3.5–5.3)
POTASSIUM SERPL-SCNC: 4.1 MMOL/L — SIGNIFICANT CHANGE UP (ref 3.5–5.3)
RBC # BLD: 3.22 M/UL — LOW (ref 4.2–5.8)
RBC # FLD: 12.4 % — SIGNIFICANT CHANGE UP (ref 10.3–14.5)
S AUREUS DNA NOSE QL NAA+PROBE: SIGNIFICANT CHANGE UP
SODIUM SERPL-SCNC: 145 MMOL/L — SIGNIFICANT CHANGE UP (ref 135–145)
WBC # BLD: 7.27 K/UL — SIGNIFICANT CHANGE UP (ref 3.8–10.5)
WBC # FLD AUTO: 7.27 K/UL — SIGNIFICANT CHANGE UP (ref 3.8–10.5)

## 2024-11-02 PROCEDURE — 99233 SBSQ HOSP IP/OBS HIGH 50: CPT

## 2024-11-02 RX ORDER — DOXYCYCLINE HYCLATE 100 MG/1
100 TABLET, FILM COATED ORAL EVERY 12 HOURS
Refills: 0 | Status: DISCONTINUED | OUTPATIENT
Start: 2024-11-02 | End: 2024-11-06

## 2024-11-02 RX ADMIN — POLYETHYLENE GLYCOL 3350 17 GRAM(S): 17 POWDER, FOR SOLUTION ORAL at 09:05

## 2024-11-02 RX ADMIN — PANCRELIPASE 4 CAPSULE(S): 16800; 98400; 56800 CAPSULE, DELAYED RELEASE ORAL at 09:06

## 2024-11-02 RX ADMIN — MIRTAZAPINE 15 MILLIGRAM(S): 30 TABLET ORAL at 21:56

## 2024-11-02 RX ADMIN — Medication 2 TABLET(S): at 21:56

## 2024-11-02 RX ADMIN — CEFEPIME 1000 MILLIGRAM(S): 2 INJECTION, POWDER, FOR SOLUTION INTRAVENOUS at 21:57

## 2024-11-02 RX ADMIN — HEPARIN SODIUM 5000 UNIT(S): 10000 INJECTION INTRAVENOUS; SUBCUTANEOUS at 21:57

## 2024-11-02 RX ADMIN — Medication 1 TABLET(S): at 09:05

## 2024-11-02 RX ADMIN — PANCRELIPASE 4 CAPSULE(S): 16800; 98400; 56800 CAPSULE, DELAYED RELEASE ORAL at 17:58

## 2024-11-02 RX ADMIN — Medication 650 MILLIGRAM(S): at 13:45

## 2024-11-02 RX ADMIN — CEFEPIME 1000 MILLIGRAM(S): 2 INJECTION, POWDER, FOR SOLUTION INTRAVENOUS at 09:05

## 2024-11-02 RX ADMIN — Medication 3 MILLIGRAM(S): at 21:57

## 2024-11-02 RX ADMIN — OXYBUTYNIN CHLORIDE 5 MILLIGRAM(S): 5 TABLET ORAL at 09:05

## 2024-11-02 RX ADMIN — OXYBUTYNIN CHLORIDE 5 MILLIGRAM(S): 5 TABLET ORAL at 21:56

## 2024-11-02 RX ADMIN — PANCRELIPASE 4 CAPSULE(S): 16800; 98400; 56800 CAPSULE, DELAYED RELEASE ORAL at 13:02

## 2024-11-02 RX ADMIN — HEPARIN SODIUM 5000 UNIT(S): 10000 INJECTION INTRAVENOUS; SUBCUTANEOUS at 05:10

## 2024-11-02 RX ADMIN — SERTRALINE HYDROCHLORIDE 25 MILLIGRAM(S): 50 TABLET, FILM COATED ORAL at 09:06

## 2024-11-02 RX ADMIN — HEPARIN SODIUM 5000 UNIT(S): 10000 INJECTION INTRAVENOUS; SUBCUTANEOUS at 13:02

## 2024-11-02 RX ADMIN — GABAPENTIN 600 MILLIGRAM(S): 300 CAPSULE ORAL at 21:57

## 2024-11-02 RX ADMIN — Medication 650 MILLIGRAM(S): at 13:01

## 2024-11-02 RX ADMIN — POLYETHYLENE GLYCOL 3350 17 GRAM(S): 17 POWDER, FOR SOLUTION ORAL at 21:57

## 2024-11-02 RX ADMIN — LOSARTAN POTASSIUM 50 MILLIGRAM(S): 25 TABLET ORAL at 09:05

## 2024-11-02 RX ADMIN — DOXYCYCLINE HYCLATE 100 MILLIGRAM(S): 100 TABLET, FILM COATED ORAL at 09:06

## 2024-11-02 NOTE — PROGRESS NOTE ADULT - SUBJECTIVE AND OBJECTIVE BOX
HOSPITALIST ATTENDING PROGRESS NOTE    Chart and meds reviewed.  Patient seen and examined.    CC: fever    Subjective: NAEO. Informed by rn pt had swelling of penis urology consulted for paraphimosis.    All other systems reviewed and found to be negative with the exception of what has been described above.    MEDICATIONS  (STANDING):  cefepime  Injectable. 1000 milliGRAM(s) IV Push every 12 hours  doxycycline monohydrate Suspension 100 milliGRAM(s) Oral every 12 hours  gabapentin 600 milliGRAM(s) Oral at bedtime  heparin   Injectable 5000 Unit(s) SubCutaneous every 8 hours  lactobacillus acidophilus 1 Tablet(s) Oral daily  losartan 50 milliGRAM(s) Oral daily  mirtazapine 15 milliGRAM(s) Oral at bedtime  oxybutynin 5 milliGRAM(s) Oral two times a day  pancrelipase  (CREON 12,000 Lipase Units) 4 Capsule(s) Oral three times a day with meals  polyethylene glycol 3350 17 Gram(s) Oral two times a day  senna 2 Tablet(s) Oral at bedtime  sertraline 25 milliGRAM(s) Oral daily    MEDICATIONS  (PRN):  acetaminophen     Tablet .. 650 milliGRAM(s) Oral every 6 hours PRN Temp greater or equal to 38C (100.4F), Mild Pain (1 - 3)  aluminum hydroxide/magnesium hydroxide/simethicone Suspension 30 milliLiter(s) Oral every 4 hours PRN Dyspepsia  guaifenesin/dextromethorphan Oral Liquid 10 milliLiter(s) Oral every 4 hours PRN Cough  melatonin 3 milliGRAM(s) Oral at bedtime PRN Insomnia  ondansetron Injectable 4 milliGRAM(s) IV Push every 8 hours PRN Nausea and/or Vomiting      VITALS:  T(F): 97.9 (11-02-24 @ 15:57), Max: 97.9 (11-02-24 @ 15:57)  HR: 72 (11-02-24 @ 15:57) (58 - 72)  BP: 151/77 (11-02-24 @ 15:57) (121/75 - 155/68)  RR: 18 (11-02-24 @ 15:57) (18 - 18)  SpO2: 100% (11-02-24 @ 15:57) (94% - 100%)  Wt(kg): --    I&O's Summary      CAPILLARY BLOOD GLUCOSE          PHYSICAL EXAM:  Gen: NAD  HEENT:  pupils equal and reactive, EOMI, no oropharyngeal lesions, erythema, exudates, oral thrush  NECK:   supple, no carotid bruits, no palpable lymph nodes, no thyromegaly  CV:  +S1, +S2, regular, no murmurs or rubs  RESP:   lungs clear to auscultation bilaterally, no wheezing, rales, rhonchi, good air entry bilaterally  BREAST:  not examined  GI:  abdomen soft, non-tender, non-distended, normal BS, no bruits, no abdominal masses, no palpable masses  RECTAL:  not examined  :  red swelling of penis   MSK:   normal muscle tone, no atrophy, no rigidity, no contractions  EXT:  no clubbing, no cyanosis, no edema, no calf pain, swelling or erythema  VASCULAR:  pulses equal and symmetric in the upper and lower extremities  NEURO:  AAOX3, no focal neurological deficits, follows all commands, able to move extremities spontaneously  SKIN:  no ulcers, lesions or rashes    LABS:                            10.6   7.27  )-----------( 118      ( 02 Nov 2024 06:44 )             32.1     11-02    145  |  117[H]  |  33[H]  ----------------------------<  107[H]  4.1   |  27  |  1.15    Ca    8.9      02 Nov 2024 06:44    TPro  7.3  /  Alb  3.5  /  TBili  0.7  /  DBili  x   /  AST  12[L]  /  ALT  19  /  AlkPhos  75  10-31        LIVER FUNCTIONS - ( 31 Oct 2024 22:02 )  Alb: 3.5 g/dL / Pro: 7.3 gm/dL / ALK PHOS: 75 U/L / ALT: 19 U/L / AST: 12 U/L / GGT: x           PT/INR - ( 31 Oct 2024 22:02 )   PT: 12.2 sec;   INR: 1.03 ratio         PTT - ( 31 Oct 2024 22:02 )  PTT:29.8 sec  Urinalysis Basic - ( 02 Nov 2024 06:44 )    Color: x / Appearance: x / SG: x / pH: x  Gluc: 107 mg/dL / Ketone: x  / Bili: x / Urobili: x   Blood: x / Protein: x / Nitrite: x   Leuk Esterase: x / RBC: x / WBC x   Sq Epi: x / Non Sq Epi: x / Bacteria: x              CULTURES:      Additional results/Imaging, I have personally reviewed:    Telemetry, personally reviewed:

## 2024-11-02 NOTE — CONSULT NOTE ADULT - SUBJECTIVE AND OBJECTIVE BOX
Chief Complaint:  Patient is a 89y old  Male who presents with a chief complaint of Sepsis due to HCAP (01 Nov 2024 14:02)      HPI:  Chief Complaint: fever.    The patient is a 89y Male with significant PMH of dementia,  HTN, R eye blindness, anxiety, depression, BPH, who lives with his wife and his son presented to the ED by daughter with c/o cough that developed yesterday but was more alert than usual. Patient is very poor historian due to underlying dementia. Most of the information has been obtained from EMR documentation. in d/w ED provider and daughter on phone.  Per daughter, patient was more lethargic with chills and rigor and his body was warn than his baseline. Reportedly, patient goes daily to day program at [facility].   He is comfortably laying in his bed and no obvious distress.    CT chest shows: IMPRESSION: Patchy densities at the lung bases may reflect atelectasis, edema or pneumonitis.    Sig labs: Leukocytosis of 11.36k with N 87% and elevated lactate level 2.2 which came down to 1.6 after hydration. BUN 34, Cr 1.37 (was 1.28 on 07/19/2024).   UA and RVP negative.    Vancomycin and Cefepime IV given in ED after blood culture draw.    Consult called for increased edema at the head of the penis.. noted paraphimosis        (01 Nov 2024 02:55)      PMH/PSH:PAST MEDICAL & SURGICAL HISTORY:  Hypertension      Anxiety      Hyperlipidemia      OA (osteoarthritis)      Dementia      Depression      BPH (benign prostatic hyperplasia)      History of tonsillectomy      H/O total knee replacement, right          Allergies:  No Known Allergies      Medications:  acetaminophen     Tablet .. 650 milliGRAM(s) Oral every 6 hours PRN  aluminum hydroxide/magnesium hydroxide/simethicone Suspension 30 milliLiter(s) Oral every 4 hours PRN  cefepime  Injectable. 1000 milliGRAM(s) IV Push every 12 hours  doxycycline monohydrate Suspension 100 milliGRAM(s) Oral every 12 hours  gabapentin 600 milliGRAM(s) Oral at bedtime  guaifenesin/dextromethorphan Oral Liquid 10 milliLiter(s) Oral every 4 hours PRN  heparin   Injectable 5000 Unit(s) SubCutaneous every 8 hours  lactobacillus acidophilus 1 Tablet(s) Oral daily  losartan 50 milliGRAM(s) Oral daily  melatonin 3 milliGRAM(s) Oral at bedtime PRN  mirtazapine 15 milliGRAM(s) Oral at bedtime  ondansetron Injectable 4 milliGRAM(s) IV Push every 8 hours PRN  oxybutynin 5 milliGRAM(s) Oral two times a day  pancrelipase  (CREON 12,000 Lipase Units) 4 Capsule(s) Oral three times a day with meals  polyethylene glycol 3350 17 Gram(s) Oral two times a day  senna 2 Tablet(s) Oral at bedtime  sertraline 25 milliGRAM(s) Oral daily      REVIEW OF SYSTEMS:  All other review of systems is negative unless indicated above.    Relevant Family History:   FAMILY HISTORY:  No pertinent family history        Relevant Social History:  Denies ETOH or tobacco history    Physical Exam:    Vital Signs:  Vital Signs Last 24 Hrs  T(C): 36.3 (02 Nov 2024 07:20), Max: 36.6 (01 Nov 2024 23:39)  T(F): 97.3 (02 Nov 2024 07:20), Max: 97.8 (01 Nov 2024 23:39)  HR: 61 (02 Nov 2024 07:20) (52 - 61)  BP: 155/68 (02 Nov 2024 07:20) (116/64 - 155/68)  BP(mean): --  RR: 18 (02 Nov 2024 07:20) (18 - 18)  SpO2: 100% (02 Nov 2024 07:20) (94% - 100%)    Parameters below as of 02 Nov 2024 07:20  Patient On (Oxygen Delivery Method): room air      Daily     Daily   pt with mild confusion, interview limited   Constitutional: WDWN resting comfortably in bed; NAD  HEENT: NC/AT, PERRL, EOMI, anicteric sclera, no nasal discharge; uvula midline, no oropharyngeal erythema or exudates  Neck: supple; no JVD or thyromegaly  Respiratory: CTA B/L; no W/R/R, no retractions  Cardiac: +S1/S2; RRR; no M/R/G; PMI non-displaced  Gastrointestinal: soft, NT/ND; no rebound or guarding; +BS   Extremities: , no clubbing or cyanosis; no peripheral edema  Musculoskeletal:  no joint swelling, tenderness or erythema  Vascular: 2+ radial, femoral, DP/PT pulses B/L  Skin: warm, dry and intact; no rashes, wounds, or scars  Neurologic: AAOx3; CNS grossly intact; no focal deficits no asterixis, no tremor, no encephalopathy  : head of penis edematous , tender with stricter                             10.6   7.27  )-----------( 118      ( 02 Nov 2024 06:44 )             32.1     11-02    145  |  117[H]  |  33[H]  ----------------------------<  107[H]  4.1   |  27  |  1.15    Ca    8.9      02 Nov 2024 06:44    TPro  7.3  /  Alb  3.5  /  TBili  0.7  /  DBili  x   /  AST  12[L]  /  ALT  19  /  AlkPhos  75  10-31    LIVER FUNCTIONS - ( 31 Oct 2024 22:02 )  Alb: 3.5 g/dL / Pro: 7.3 gm/dL / ALK PHOS: 75 U/L / ALT: 19 U/L / AST: 12 U/L / GGT: x           PT/INR - ( 31 Oct 2024 22:02 )   PT: 12.2 sec;   INR: 1.03 ratio         PTT - ( 31 Oct 2024 22:02 )  PTT:29.8 sec  Urinalysis Basic - ( 02 Nov 2024 06:44 )    Color: x / Appearance: x / SG: x / pH: x  Gluc: 107 mg/dL / Ketone: x  / Bili: x / Urobili: x   Blood: x / Protein: x / Nitrite: x   Leuk Esterase: x / RBC: x / WBC x   Sq Epi: x / Non Sq Epi: x / Bacteria: x

## 2024-11-02 NOTE — CONSULT NOTE ADULT - ASSESSMENT
The patient is a 89y Male with significant PMH of dementia,  HTN, R eye blindness, anxiety, depression, BPH, who lives with his wife and his son presented to the ED by daughter with c/o cough that developed yesterday but was more alert than usual. Patient is very poor historian due to underlying dementia. Most of the information has been obtained from EMR documentation. in d/w ED provider and daughter on phone.  Per daughter, patient was more lethargic with chills and rigor and his body was warn than his baseline. Reportedly, patient goes daily to day program at [facility].   He is comfortably laying in his bed and no obvious distress.  Pt noted with paraphimosis     Plan   foreskin retracted back in place   Pt tolerated it well  pain medication  Dr Shoemaker aware  The patient is a 89y Male with significant PMH of dementia,  HTN, R eye blindness, anxiety, depression, BPH, who lives with his wife and his son presented to the ED by daughter with c/o cough that developed yesterday but was more alert than usual. Patient is very poor historian due to underlying dementia. Most of the information has been obtained from EMR documentation. in d/w ED provider and daughter on phone.  Per daughter, patient was more lethargic with chills and rigor and his body was warn than his baseline. Reportedly, patient goes daily to day program at [facility].   He is comfortably laying in his bed and no obvious distress.  Pt noted with paraphimosis     Plan   foreskin retracted back in place   Pt tolerated it well  no condom cath for now   pain medication  Dr Shoemaker aware

## 2024-11-02 NOTE — PROGRESS NOTE ADULT - ASSESSMENT
· Assessment	  The patient is a 89y Male with significant PMH of dementia,  HTN, R eye blindness, anxiety, depression, BPH, who lives with his wife and his son presented to the ED by daughter with c/o cough that developed yesterday but was more alert than usual. Patient is very poor historian due to underlying dementia. Most of the information has been obtained from EMR documentation. in d/w ED provider and daughter on phone.  Per daughter, patient was more lethargic with chills and rigor and his body was warn than his baseline. Reportedly, patient goes daily to day program at [facility].     # HCAP of both lower lobes Lt > Rt.  -Leukocytosis with left shift. Elevated lactate level, now resolved.  -Admit to medical floor.  -Follow blood and sputum cultures.  -S/p vanc zosyn   -Tylenol and Robitussin prn.  -ID consulted appreciate recs   -trend fever, wbc curve   -start cefepime 1g q12 hrs and doxy 100 mg bid     # CKD-stage IIIa.  -Check BMP daily     # HTN .  -On Losartan.    # Anxiety and depression.  -C/w his Sertraline and Mirtazapine.    # OA.  -On Gabapentin.    # H/o BPH and urinary incontinence.  -C/q his Solifenacin->Oxybutynin.    # DVT prophylaxis: Heparin sq.  #NOK son Guero

## 2024-11-03 LAB
ANION GAP SERPL CALC-SCNC: 4 MMOL/L — LOW (ref 5–17)
BUN SERPL-MCNC: 27 MG/DL — HIGH (ref 7–23)
CALCIUM SERPL-MCNC: 8.8 MG/DL — SIGNIFICANT CHANGE UP (ref 8.5–10.1)
CHLORIDE SERPL-SCNC: 115 MMOL/L — HIGH (ref 96–108)
CO2 SERPL-SCNC: 25 MMOL/L — SIGNIFICANT CHANGE UP (ref 22–31)
CREAT SERPL-MCNC: 1.05 MG/DL — SIGNIFICANT CHANGE UP (ref 0.5–1.3)
EGFR: 66 ML/MIN/1.73M2 — SIGNIFICANT CHANGE UP
GLUCOSE SERPL-MCNC: 98 MG/DL — SIGNIFICANT CHANGE UP (ref 70–99)
HCT VFR BLD CALC: 31.9 % — LOW (ref 39–50)
HGB BLD-MCNC: 10.6 G/DL — LOW (ref 13–17)
MCHC RBC-ENTMCNC: 32.9 PG — SIGNIFICANT CHANGE UP (ref 27–34)
MCHC RBC-ENTMCNC: 33.2 G/DL — SIGNIFICANT CHANGE UP (ref 32–36)
MCV RBC AUTO: 99.1 FL — SIGNIFICANT CHANGE UP (ref 80–100)
PLATELET # BLD AUTO: 125 K/UL — LOW (ref 150–400)
POTASSIUM SERPL-MCNC: 4.1 MMOL/L — SIGNIFICANT CHANGE UP (ref 3.5–5.3)
POTASSIUM SERPL-SCNC: 4.1 MMOL/L — SIGNIFICANT CHANGE UP (ref 3.5–5.3)
RBC # BLD: 3.22 M/UL — LOW (ref 4.2–5.8)
RBC # FLD: 12.3 % — SIGNIFICANT CHANGE UP (ref 10.3–14.5)
S PNEUM AG UR QL: NEGATIVE — SIGNIFICANT CHANGE UP
SODIUM SERPL-SCNC: 144 MMOL/L — SIGNIFICANT CHANGE UP (ref 135–145)
WBC # BLD: 5.26 K/UL — SIGNIFICANT CHANGE UP (ref 3.8–10.5)
WBC # FLD AUTO: 5.26 K/UL — SIGNIFICANT CHANGE UP (ref 3.8–10.5)

## 2024-11-03 PROCEDURE — 99232 SBSQ HOSP IP/OBS MODERATE 35: CPT

## 2024-11-03 RX ORDER — QUETIAPINE FUMARATE 200 MG/1
25 TABLET ORAL AT BEDTIME
Refills: 0 | Status: DISCONTINUED | OUTPATIENT
Start: 2024-11-03 | End: 2024-11-06

## 2024-11-03 RX ADMIN — PANCRELIPASE 4 CAPSULE(S): 16800; 98400; 56800 CAPSULE, DELAYED RELEASE ORAL at 13:24

## 2024-11-03 RX ADMIN — Medication 3 MILLIGRAM(S): at 22:31

## 2024-11-03 RX ADMIN — HEPARIN SODIUM 5000 UNIT(S): 10000 INJECTION INTRAVENOUS; SUBCUTANEOUS at 22:32

## 2024-11-03 RX ADMIN — Medication 650 MILLIGRAM(S): at 20:30

## 2024-11-03 RX ADMIN — DOXYCYCLINE HYCLATE 100 MILLIGRAM(S): 100 TABLET, FILM COATED ORAL at 00:36

## 2024-11-03 RX ADMIN — POLYETHYLENE GLYCOL 3350 17 GRAM(S): 17 POWDER, FOR SOLUTION ORAL at 22:32

## 2024-11-03 RX ADMIN — QUETIAPINE FUMARATE 25 MILLIGRAM(S): 200 TABLET ORAL at 19:48

## 2024-11-03 RX ADMIN — CEFEPIME 1000 MILLIGRAM(S): 2 INJECTION, POWDER, FOR SOLUTION INTRAVENOUS at 22:32

## 2024-11-03 RX ADMIN — HEPARIN SODIUM 5000 UNIT(S): 10000 INJECTION INTRAVENOUS; SUBCUTANEOUS at 13:24

## 2024-11-03 RX ADMIN — PANCRELIPASE 4 CAPSULE(S): 16800; 98400; 56800 CAPSULE, DELAYED RELEASE ORAL at 17:50

## 2024-11-03 RX ADMIN — CEFEPIME 1000 MILLIGRAM(S): 2 INJECTION, POWDER, FOR SOLUTION INTRAVENOUS at 09:23

## 2024-11-03 RX ADMIN — GABAPENTIN 600 MILLIGRAM(S): 300 CAPSULE ORAL at 22:32

## 2024-11-03 RX ADMIN — POLYETHYLENE GLYCOL 3350 17 GRAM(S): 17 POWDER, FOR SOLUTION ORAL at 09:24

## 2024-11-03 RX ADMIN — HEPARIN SODIUM 5000 UNIT(S): 10000 INJECTION INTRAVENOUS; SUBCUTANEOUS at 05:41

## 2024-11-03 RX ADMIN — DOXYCYCLINE HYCLATE 100 MILLIGRAM(S): 100 TABLET, FILM COATED ORAL at 22:31

## 2024-11-03 RX ADMIN — Medication 650 MILLIGRAM(S): at 19:48

## 2024-11-03 RX ADMIN — MIRTAZAPINE 15 MILLIGRAM(S): 30 TABLET ORAL at 22:31

## 2024-11-03 RX ADMIN — Medication 2 TABLET(S): at 22:31

## 2024-11-03 NOTE — PROGRESS NOTE ADULT - ASSESSMENT
Assessment	  The patient is a 89y Male with significant PMH of dementia,  HTN, R eye blindness, anxiety, depression, BPH, who lives with his wife and his son presented to the ED by daughter with c/o cough that developed yesterday but was more alert than usual. Patient is very poor historian due to underlying dementia. Most of the information has been obtained from EMR documentation. in d/w ED provider and daughter on phone.  Per daughter, patient was more lethargic with chills and rigor and his body was warn than his baseline. Reportedly, patient goes daily to day program at [facility].     # HCAP of both lower lobes Lt > Rt.  -Leukocytosis with left shift. Elevated lactate level, now resolved.  -Admit to medical floor.  -Follow blood and sputum cultures.  -S/p vanc zosyn   -Tylenol and Robitussin prn.  -ID consulted appreciate recs   -trend fever, wbc curve   -cw cefepime 1g q12 hrs and doxy 100 mg bid     # CKD-stage IIIa.  -Check BMP daily     # HTN .  -On Losartan.    # Anxiety and depression.  -C/w his Sertraline and Mirtazapine.    # OA.  -On Gabapentin.    # H/o BPH and urinary incontinence.  -oxybutynin held as could be causing urinary retention   -can consider starting tamsulosin if unable to remove duran    # DVT prophylaxis: Heparin sq.  #JONNATHANK son Guero

## 2024-11-03 NOTE — PROGRESS NOTE ADULT - SUBJECTIVE AND OBJECTIVE BOX
HOSPITALIST ATTENDING PROGRESS NOTE    Chart and meds reviewed.  Patient seen and examined.    CC: fever     Subjective: Overnight pt failed bladder scan had to be straight cath twice so duran placed this morning when bladder scan showed >400 cc. Will d/c oxybutynin as can contribute to retention. Pt frustrated this am, at baseline Aox1 but unwilling to take meds.     All other systems reviewed and found to be negative with the exception of what has been described above.    MEDICATIONS  (STANDING):  cefepime  Injectable. 1000 milliGRAM(s) IV Push every 12 hours  doxycycline monohydrate Suspension 100 milliGRAM(s) Oral every 12 hours  gabapentin 600 milliGRAM(s) Oral at bedtime  heparin   Injectable 5000 Unit(s) SubCutaneous every 8 hours  lactobacillus acidophilus 1 Tablet(s) Oral daily  losartan 50 milliGRAM(s) Oral daily  mirtazapine 15 milliGRAM(s) Oral at bedtime  pancrelipase  (CREON 12,000 Lipase Units) 4 Capsule(s) Oral three times a day with meals  polyethylene glycol 3350 17 Gram(s) Oral two times a day  senna 2 Tablet(s) Oral at bedtime  sertraline 25 milliGRAM(s) Oral daily    MEDICATIONS  (PRN):  acetaminophen     Tablet .. 650 milliGRAM(s) Oral every 6 hours PRN Temp greater or equal to 38C (100.4F), Mild Pain (1 - 3)  aluminum hydroxide/magnesium hydroxide/simethicone Suspension 30 milliLiter(s) Oral every 4 hours PRN Dyspepsia  guaifenesin/dextromethorphan Oral Liquid 10 milliLiter(s) Oral every 4 hours PRN Cough  melatonin 3 milliGRAM(s) Oral at bedtime PRN Insomnia  ondansetron Injectable 4 milliGRAM(s) IV Push every 8 hours PRN Nausea and/or Vomiting      VITALS:  T(F): 98.1 (11-03-24 @ 07:55), Max: 98.1 (11-02-24 @ 23:46)  HR: 51 (11-03-24 @ 07:55) (51 - 72)  BP: 167/70 (11-03-24 @ 07:55) (151/77 - 169/81)  RR: 18 (11-03-24 @ 07:55) (18 - 18)  SpO2: 99% (11-03-24 @ 07:55) (99% - 100%)  Wt(kg): --    I&O's Summary      CAPILLARY BLOOD GLUCOSE          PHYSICAL EXAM:  Gen: NAD  HEENT: right pupil enucleation   NECK:   supple, no carotid bruits, no palpable lymph nodes, no thyromegaly  CV:  +S1, +S2, regular, no murmurs or rubs  RESP:   lungs clear to auscultation bilaterally, no wheezing, rales, rhonchi, good air entry bilaterally  BREAST:  not examined  GI:  abdomen soft, non-tender, non-distended, normal BS, no bruits, no abdominal masses, no palpable masses  RECTAL:  not examined  :  not examined  MSK:   normal muscle tone, no atrophy, no rigidity, no contractions  EXT:  no clubbing, no cyanosis, no edema, no calf pain, swelling or erythema  VASCULAR:  pulses equal and symmetric in the upper and lower extremities  NEURO:  AAOX1 follows commands   SKIN:  no ulcers, lesions or rashes    LABS:                            10.6   5.26  )-----------( 125      ( 03 Nov 2024 07:17 )             31.9     11-03    144  |  115[H]  |  27[H]  ----------------------------<  98  4.1   |  25  |  1.05    Ca    8.8      03 Nov 2024 07:17              Urinalysis Basic - ( 03 Nov 2024 07:17 )    Color: x / Appearance: x / SG: x / pH: x  Gluc: 98 mg/dL / Ketone: x  / Bili: x / Urobili: x   Blood: x / Protein: x / Nitrite: x   Leuk Esterase: x / RBC: x / WBC x   Sq Epi: x / Non Sq Epi: x / Bacteria: x

## 2024-11-04 DIAGNOSIS — F03.90 UNSPECIFIED DEMENTIA, UNSPECIFIED SEVERITY, WITHOUT BEHAVIORAL DISTURBANCE, PSYCHOTIC DISTURBANCE, MOOD DISTURBANCE, AND ANXIETY: ICD-10-CM

## 2024-11-04 LAB
ANION GAP SERPL CALC-SCNC: 3 MMOL/L — LOW (ref 5–17)
BUN SERPL-MCNC: 31 MG/DL — HIGH (ref 7–23)
CALCIUM SERPL-MCNC: 8.8 MG/DL — SIGNIFICANT CHANGE UP (ref 8.5–10.1)
CHLORIDE SERPL-SCNC: 113 MMOL/L — HIGH (ref 96–108)
CO2 SERPL-SCNC: 26 MMOL/L — SIGNIFICANT CHANGE UP (ref 22–31)
CREAT SERPL-MCNC: 1.18 MG/DL — SIGNIFICANT CHANGE UP (ref 0.5–1.3)
EGFR: 58 ML/MIN/1.73M2 — LOW
GLUCOSE SERPL-MCNC: 133 MG/DL — HIGH (ref 70–99)
HCT VFR BLD CALC: 30.4 % — LOW (ref 39–50)
HGB BLD-MCNC: 10.2 G/DL — LOW (ref 13–17)
MCHC RBC-ENTMCNC: 33.3 PG — SIGNIFICANT CHANGE UP (ref 27–34)
MCHC RBC-ENTMCNC: 33.6 G/DL — SIGNIFICANT CHANGE UP (ref 32–36)
MCV RBC AUTO: 99.3 FL — SIGNIFICANT CHANGE UP (ref 80–100)
PLATELET # BLD AUTO: 135 K/UL — LOW (ref 150–400)
POTASSIUM SERPL-MCNC: 3.9 MMOL/L — SIGNIFICANT CHANGE UP (ref 3.5–5.3)
POTASSIUM SERPL-SCNC: 3.9 MMOL/L — SIGNIFICANT CHANGE UP (ref 3.5–5.3)
RBC # BLD: 3.06 M/UL — LOW (ref 4.2–5.8)
RBC # FLD: 12.4 % — SIGNIFICANT CHANGE UP (ref 10.3–14.5)
SODIUM SERPL-SCNC: 142 MMOL/L — SIGNIFICANT CHANGE UP (ref 135–145)
WBC # BLD: 5.14 K/UL — SIGNIFICANT CHANGE UP (ref 3.8–10.5)
WBC # FLD AUTO: 5.14 K/UL — SIGNIFICANT CHANGE UP (ref 3.8–10.5)

## 2024-11-04 PROCEDURE — 99232 SBSQ HOSP IP/OBS MODERATE 35: CPT

## 2024-11-04 PROCEDURE — 99222 1ST HOSP IP/OBS MODERATE 55: CPT

## 2024-11-04 RX ORDER — TAMSULOSIN HCL 0.4 MG
0.4 CAPSULE ORAL AT BEDTIME
Refills: 0 | Status: DISCONTINUED | OUTPATIENT
Start: 2024-11-04 | End: 2024-11-06

## 2024-11-04 RX ORDER — POTASSIUM CHLORIDE 10 MEQ
10 TABLET, EXTENDED RELEASE ORAL DAILY
Refills: 0 | Status: DISCONTINUED | OUTPATIENT
Start: 2024-11-04 | End: 2024-11-06

## 2024-11-04 RX ADMIN — SERTRALINE HYDROCHLORIDE 25 MILLIGRAM(S): 50 TABLET, FILM COATED ORAL at 09:49

## 2024-11-04 RX ADMIN — GABAPENTIN 600 MILLIGRAM(S): 300 CAPSULE ORAL at 22:26

## 2024-11-04 RX ADMIN — CEFEPIME 1000 MILLIGRAM(S): 2 INJECTION, POWDER, FOR SOLUTION INTRAVENOUS at 22:27

## 2024-11-04 RX ADMIN — Medication 0.4 MILLIGRAM(S): at 22:27

## 2024-11-04 RX ADMIN — PANCRELIPASE 4 CAPSULE(S): 16800; 98400; 56800 CAPSULE, DELAYED RELEASE ORAL at 14:22

## 2024-11-04 RX ADMIN — DOXYCYCLINE HYCLATE 100 MILLIGRAM(S): 100 TABLET, FILM COATED ORAL at 09:49

## 2024-11-04 RX ADMIN — POLYETHYLENE GLYCOL 3350 17 GRAM(S): 17 POWDER, FOR SOLUTION ORAL at 09:49

## 2024-11-04 RX ADMIN — LOSARTAN POTASSIUM 50 MILLIGRAM(S): 25 TABLET ORAL at 09:49

## 2024-11-04 RX ADMIN — Medication 1 TABLET(S): at 09:49

## 2024-11-04 RX ADMIN — PANCRELIPASE 4 CAPSULE(S): 16800; 98400; 56800 CAPSULE, DELAYED RELEASE ORAL at 17:48

## 2024-11-04 RX ADMIN — DOXYCYCLINE HYCLATE 100 MILLIGRAM(S): 100 TABLET, FILM COATED ORAL at 22:28

## 2024-11-04 RX ADMIN — PANCRELIPASE 4 CAPSULE(S): 16800; 98400; 56800 CAPSULE, DELAYED RELEASE ORAL at 09:48

## 2024-11-04 RX ADMIN — CEFEPIME 1000 MILLIGRAM(S): 2 INJECTION, POWDER, FOR SOLUTION INTRAVENOUS at 09:48

## 2024-11-04 RX ADMIN — MIRTAZAPINE 15 MILLIGRAM(S): 30 TABLET ORAL at 22:27

## 2024-11-04 RX ADMIN — HEPARIN SODIUM 5000 UNIT(S): 10000 INJECTION INTRAVENOUS; SUBCUTANEOUS at 22:27

## 2024-11-04 RX ADMIN — POLYETHYLENE GLYCOL 3350 17 GRAM(S): 17 POWDER, FOR SOLUTION ORAL at 22:28

## 2024-11-04 RX ADMIN — HEPARIN SODIUM 5000 UNIT(S): 10000 INJECTION INTRAVENOUS; SUBCUTANEOUS at 05:20

## 2024-11-04 RX ADMIN — Medication 2 TABLET(S): at 22:27

## 2024-11-04 RX ADMIN — HEPARIN SODIUM 5000 UNIT(S): 10000 INJECTION INTRAVENOUS; SUBCUTANEOUS at 14:23

## 2024-11-04 RX ADMIN — Medication 10 MILLIEQUIVALENT(S): at 09:48

## 2024-11-04 NOTE — PROGRESS NOTE ADULT - ASSESSMENT
89y Male with significant PMH of dementia,  HTN, R eye blindness, anxiety, depression, BPH, who lives with his wife and his son presented to the ED by daughter with c/o cough that developed day prior to admit, but was more alert than usual. Patient is very poor historian due to underlying dementia. Most of the information has been obtained from EMR documentation. in d/w ED provider and daughter on phone.  Per daughter, patient was more lethargic with chills and rigor and his body was warn than his baseline. Reportedly, patient goes daily to day program at [facility].  He is comfortably laying in his bed and no obvious distress. CT chest shows: IMPRESSION: Patchy densities at the lung bases may reflect atelectasis, edema or pneumonitis. Sig labs: Leukocytosis of 11.36k with N 87% and elevated lactate level 2.2 which came down to 1.6 after hydration. BUN 34, Cr 1.37 (was 1.28 on 07/19/2024).  UA and RVP negative. Vancomycin and Cefepime IV given in ED after blood culture draw    1. Sepsis. Cough. Pneumonia. Alzheimers dementia. CKD  - imaging reviewed  - on cefepime [id number] #4   - on doxycycline 100mg bid #4   - continue with antibiotic coverage  - monitor temps  - tolerating abx well so far; no side effects noted  - reason for abx use and side effects reviewed with patient  - supportive care  - fu cbc  - f/u cultures      Clinical team may change from intravenous to oral antibiotics when the following criteria are met:   1. Patient is clinically improving/stable       a)	Improved signs and symptoms of infection from initial presentation       b)	Afebrile for 24 hours       c)	Leukocytosis trending towards normal range   2. Patient is tolerating oral intake   3. Initial/repeat blood cultures are negative    When above criteria met  may change iv antibiotics to: po doxy 5 days, po ceftin x 7 days total include abx given here

## 2024-11-04 NOTE — CONSULT NOTE ADULT - NS ATTEND AMEND GEN_ALL_CORE FT
I agree with above note.     Advanced directives DNR/DNI vs. CPR/MV was discussed at length.   At this time family/patient opted for dnr dni  MOLST on chart to reflect above.   pt w/ dementia famliy open to further discussions

## 2024-11-04 NOTE — PROGRESS NOTE ADULT - SUBJECTIVE AND OBJECTIVE BOX
HOSPITALIST ATTENDING PROGRESS NOTE    Chart and meds reviewed.  Patient seen and examined.    CC:    Subjective:    All other systems reviewed and found to be negative with the exception of what has been described above.    MEDICATIONS  (STANDING):  cefepime  Injectable. 1000 milliGRAM(s) IV Push every 12 hours  doxycycline monohydrate Suspension 100 milliGRAM(s) Oral every 12 hours  gabapentin 600 milliGRAM(s) Oral at bedtime  heparin   Injectable 5000 Unit(s) SubCutaneous every 8 hours  lactobacillus acidophilus 1 Tablet(s) Oral daily  losartan 50 milliGRAM(s) Oral daily  mirtazapine 15 milliGRAM(s) Oral at bedtime  pancrelipase  (CREON 12,000 Lipase Units) 4 Capsule(s) Oral three times a day with meals  polyethylene glycol 3350 17 Gram(s) Oral two times a day  potassium chloride    Tablet ER 10 milliEquivalent(s) Oral daily  senna 2 Tablet(s) Oral at bedtime  sertraline 25 milliGRAM(s) Oral daily  tamsulosin 0.4 milliGRAM(s) Oral at bedtime    MEDICATIONS  (PRN):  acetaminophen     Tablet .. 650 milliGRAM(s) Oral every 6 hours PRN Temp greater or equal to 38C (100.4F), Mild Pain (1 - 3)  aluminum hydroxide/magnesium hydroxide/simethicone Suspension 30 milliLiter(s) Oral every 4 hours PRN Dyspepsia  guaifenesin/dextromethorphan Oral Liquid 10 milliLiter(s) Oral every 4 hours PRN Cough  melatonin 3 milliGRAM(s) Oral at bedtime PRN Insomnia  ondansetron Injectable 4 milliGRAM(s) IV Push every 8 hours PRN Nausea and/or Vomiting  QUEtiapine 25 milliGRAM(s) Oral at bedtime PRN agitation      VITALS:  T(F): 98.8 (11-04-24 @ 15:18), Max: 99.7 (11-03-24 @ 22:25)  HR: 53 (11-04-24 @ 15:18) (53 - 83)  BP: 119/56 (11-04-24 @ 15:18) (119/56 - 154/68)  RR: 18 (11-04-24 @ 15:18) (18 - 18)  SpO2: 97% (11-04-24 @ 15:18) (94% - 98%)  Wt(kg): --    I&O's Summary    03 Nov 2024 07:01  -  04 Nov 2024 07:00  --------------------------------------------------------  IN: 0 mL / OUT: 800 mL / NET: -800 mL        CAPILLARY BLOOD GLUCOSE        PHYSICAL EXAM:  Gen: NAD  HEENT: right pupil enucleation   NECK:   supple, no carotid bruits, no palpable lymph nodes, no thyromegaly  CV:  +S1, +S2, regular, no murmurs or rubs  RESP:   lungs clear to auscultation bilaterally, no wheezing, rales, rhonchi, good air entry bilaterally  BREAST:  not examined  GI:  abdomen soft, non-tender, non-distended, normal BS, no bruits, no abdominal masses, no palpable masses  RECTAL:  not examined  :  not examined  MSK:   normal muscle tone, no atrophy, no rigidity, no contractions  EXT:  no clubbing, no cyanosis, no edema, no calf pain, swelling or erythema  VASCULAR:  pulses equal and symmetric in the upper and lower extremities  NEURO:  AAOX1 follows commands   SKIN:  no ulcers, lesions or rashes      LABS:                            10.2   5.14  )-----------( 135      ( 04 Nov 2024 06:18 )             30.4     11-04    142  |  113[H]  |  31[H]  ----------------------------<  133[H]  3.9   |  26  |  1.18    Ca    8.8      04 Nov 2024 06:18              Urinalysis Basic - ( 04 Nov 2024 06:18 )    Color: x / Appearance: x / SG: x / pH: x  Gluc: 133 mg/dL / Ketone: x  / Bili: x / Urobili: x   Blood: x / Protein: x / Nitrite: x   Leuk Esterase: x / RBC: x / WBC x   Sq Epi: x / Non Sq Epi: x / Bacteria: x

## 2024-11-04 NOTE — CONSULT NOTE ADULT - ASSESSMENT
Pt is a 89y old Male with hx of dementia,  HTN, R eye blindness, anxiety, depression, BPH, who lives with his wife and his son presented to the ED by daughter with c/o cough that developed yesterday but was more alert than usual. Patient is very poor historian due to underlying dementia. Most of the information has been obtained from EMR documentation. in d/w ED provider and daughter on phone.  Per daughter, patient was more lethargic with chills and rigor and his body was warn than his baseline. Reportedly, patient goes daily to day program at [facility].. Palliative medicine consulted to help establish GOC and advance care planning     1) Dyspnea   - PNA - both lower lobs   - ID consult appreciated   - c/w cefepime and doxy   Pt is a 89y old Male with hx of dementia,  HTN, R eye blindness, anxiety, depression, BPH, who lives with his wife and his son presented to the ED by daughter with c/o cough that developed yesterday but was more alert than usual. Patient is very poor historian due to underlying dementia. Most of the information has been obtained from EMR documentation. in d/w ED provider and daughter on phone.  Per daughter, patient was more lethargic with chills and rigor and his body was warn than his baseline. Reportedly, patient goes daily to day program at [facility].. Palliative medicine consulted to help establish GOC and advance care planning     1) Dyspnea   - PNA - both lower lobs   - ID consult appreciated   - c/w cefepime and doxy     2) CKD   - monitor labs   - avoid nephrotoxic agents     3) Dementia   - history of anxiety and depression  - c/w sertraline and mirtazapine     We discussed Palliative Care team being a supportive team when a patient has ongoing illnesses.  We also discussed that it is not an end of life care service, but can help navigate symptoms and emotional support througout their hospital stay here.    Ethical and Legal Aspects: NA  Capacity- pt does not appear to have capacity   HCP/Surrogate: no HCP on file - surrogate would be patients wife - as per chart son and daughter also involved will reach out to family   Code Status- DNR/I with trial of NIV   MOLST-   Dispo Plan-    Discussed With: Dr. Zhang coordinated with attending and SW and RN     Time Spent: 60 minutes including the care, coordination and counseling of this patient, 50% of which was spent coordinating and counseling.   Pt is a 89y old Male with hx of dementia,  HTN, R eye blindness, anxiety, depression, BPH, who lives with his wife and his son presented to the ED by daughter with c/o cough that developed yesterday but was more alert than usual. Patient is very poor historian due to underlying dementia. Most of the information has been obtained from EMR documentation. in d/w ED provider and daughter on phone.  Per daughter, patient was more lethargic with chills and rigor and his body was warn than his baseline. Reportedly, patient goes daily to day program at [facility].. Palliative medicine consulted to help establish GOC and advance care planning     1) Dyspnea   - PNA - both lower lobs   - ID consult appreciated   - c/w cefepime and doxy     2) CKD   - monitor labs   - avoid nephrotoxic agents     3) Dementia   - history of anxiety and depression  - c/w sertraline and mirtazapine     We discussed Palliative Care team being a supportive team when a patient has ongoing illnesses.  We also discussed that it is not an end of life care service, but can help navigate symptoms and emotional support througout their hospital stay here.    Ethical and Legal Aspects: NA  Capacity- pt does not appear to have capacity   HCP/Surrogate: no HCP on file - surrogate would be patients wife - as per chart son and daughter also involved will reach out to family   Code Status- DNR/I with trial of NIV   MOLST-   Dispo Plan-    Discussed With: Dr. Zhang coordinated with attending and SW and RN     Time Spent: 60 minutes including the care, coordination and counseling of this patient, 50% of which was spent coordinating and counseling.

## 2024-11-04 NOTE — PROGRESS NOTE ADULT - SUBJECTIVE AND OBJECTIVE BOX
Date of service: 11-04-24 @ 12:22    pt seen and examined  feels weak  on RA  afebrile    ROS: unable to obtain d/t medical condition     MEDICATIONS  (STANDING):  cefepime  Injectable. 1000 milliGRAM(s) IV Push every 12 hours  doxycycline monohydrate Suspension 100 milliGRAM(s) Oral every 12 hours  gabapentin 600 milliGRAM(s) Oral at bedtime  heparin   Injectable 5000 Unit(s) SubCutaneous every 8 hours  lactobacillus acidophilus 1 Tablet(s) Oral daily  losartan 50 milliGRAM(s) Oral daily  mirtazapine 15 milliGRAM(s) Oral at bedtime  pancrelipase  (CREON 12,000 Lipase Units) 4 Capsule(s) Oral three times a day with meals  polyethylene glycol 3350 17 Gram(s) Oral two times a day  potassium chloride    Tablet ER 10 milliEquivalent(s) Oral daily  senna 2 Tablet(s) Oral at bedtime  sertraline 25 milliGRAM(s) Oral daily  tamsulosin 0.4 milliGRAM(s) Oral at bedtime      Vital Signs Last 24 Hrs  T(C): 37 (04 Nov 2024 07:22), Max: 37.6 (03 Nov 2024 22:25)  T(F): 98.6 (04 Nov 2024 07:22), Max: 99.7 (03 Nov 2024 22:25)  HR: 53 (04 Nov 2024 07:22) (53 - 83)  BP: 145/71 (04 Nov 2024 07:22) (124/82 - 154/68)  BP(mean): --  RR: 18 (04 Nov 2024 07:22) (18 - 18)  SpO2: 96% (04 Nov 2024 07:22) (94% - 98%)    Parameters below as of 04 Nov 2024 07:22  Patient On (Oxygen Delivery Method): room air      PE:  Constitutional: NAD  HEENT: NC/AT, EOMI, PERRLA, conjunctivae clear; ears and nose atraumatic; pharynx benign  Neck: supple; thyroid not palpable  Back: no tenderness  Respiratory: decreased breath sounds rhonchi   Cardiovascular: S1S2 regular, no murmurs  Abdomen: soft, not tender, not distended, positive BS; liver and spleen WNL  Genitourinary: no suprapubic tenderness  Lymphatic: no LN palpable  Musculoskeletal: no muscle tenderness, no joint swelling or tenderness  Extremities: no pedal edema  Neurological/ Psychiatric:  moving all extremities  Skin: no rashes; no palpable lesions    Labs: all available labs reviewed                                   10.2   5.14  )-----------( 135      ( 04 Nov 2024 06:18 )             30.4     11-04    142  |  113[H]  |  31[H]  ----------------------------<  133[H]  3.9   |  26  |  1.18    Ca    8.8      04 Nov 2024 06:18        Urinalysis Basic - ( 01 Nov 2024 04:59 )    Color: x / Appearance: x / SG: x / pH: x  Gluc: 136 mg/dL / Ketone: x  / Bili: x / Urobili: x   Blood: x / Protein: x / Nitrite: x   Leuk Esterase: x / RBC: x / WBC x   Sq Epi: x / Non Sq Epi: x / Bacteria: x    Culture - Blood (10.31.24 @ 22:02)   Specimen Source: .Blood BLOOD  Culture Results:   No growth at 72 Hours  Culture - Blood (10.31.24 @ 22:02)   Specimen Source: .Blood BLOOD  Culture Results:   No growth at 72 Hours    Radiology: all available radiological tests reviewed      ACC: [id number] EXAM:  CT CHEST   ORDERED BY: ANA MOJICA     PROCEDURE DATE:  10/31/2024          INTERPRETATION:  EXAMINATION: CT CHEST    CLINICAL INDICATION: Cough.    TECHNIQUE: Noncontrast CT of the chest was obtained.    COMPARISON:4/25/2022.    FINDINGS:    AIRWAYS AND LUNGS: Tracheal secretions.  Patchy and linear bilateral   lower lobe opacities.    MEDIASTINUM AND PLEURA: There are no enlarged mediastinal, hilar or   axillary lymph nodes. The visualized portion of the thyroid gland is   unremarkable. There is no pleural effusion. There is no pneumothorax.    HEART AND VESSELS: There is mild cardiomegaly.  There are atherosclerotic   calcifications of the aorta and coronary arteries.  There is no   pericardial effusion.Aortic valve calcification.    UPPER ABDOMEN: Images of the upper abdomen demonstrate cholelithiasis.   Hepatic cyst.    BONES AND SOFT TISSUES: The bones are unremarkable.  The soft tissues are   unremarkable.      IMPRESSION:  Findings likely represent bibasilar atelectasis and superimposed   infectious/inflammatory process. Short-term follow-up CT needed for   complete evaluation.      Advanced directives addressed: full resuscitation

## 2024-11-04 NOTE — PROGRESS NOTE ADULT - ASSESSMENT
Assessment	  The patient is a 89y Male with significant PMH of dementia,  HTN, R eye blindness, anxiety, depression, BPH, who lives with his wife and his son presented to the ED by daughter with c/o cough that developed yesterday but was more alert than usual. Patient is very poor historian due to underlying dementia. Most of the information has been obtained from EMR documentation. in d/w ED provider and daughter on phone.  Per daughter, patient was more lethargic with chills and rigor and his body was warn than his baseline. Reportedly, patient goes daily to day program at [facility].     # HCAP of both lower lobes Lt > Rt.  -Leukocytosis with left shift. Elevated lactate level, now resolved.  -Admit to medical floor.  -Follow blood and sputum cultures.  -S/p vanc zosyn   -Tylenol and Robitussin prn.  -ID consulted appreciate recs   -trend fever, wbc curve   -cw cefepime 1g q12 hrs and doxy 100 mg bid     # CKD-stage IIIa.  -Check BMP daily     # HTN .  -On Losartan.    # Anxiety and depression.  -C/w his Sertraline and Mirtazapine.    # OA.  -On Gabapentin.    # H/o BPH and urinary incontinence.  -oxybutynin held as could be causing urinary retention   -start tamsulosin trial and void tonight     # DVT prophylaxis: Heparin sq.  #JONNATHANK son Guero

## 2024-11-04 NOTE — CONSULT NOTE ADULT - CONVERSATION DETAILS
I spoke with the patient's wife, who states that the patient lives at home with her and her son, and they have a hired  overnight who stays with the patient to ensure he is safe and does not fall out of bed. The patient's wife believes that the patient filled out HCP in the past, but we do not have it on file. She is unsure where it would be but will look. The patient's wife states that she is comfortable with either of her children making decisions as they all speak as a family. She was confirmed DNR/I with NIV MOLST on the chart with the patient's wife.   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 The wife appreciated the phone call and asked me to follow up with her daughter if more information is needed, as she is also an optometrist.   Emotional support was provided.

## 2024-11-04 NOTE — CONSULT NOTE ADULT - SUBJECTIVE AND OBJECTIVE BOX
HPI: Pt is a 89y old Male with hx of dementia,  HTN, R eye blindness, anxiety, depression, BPH, who lives with his wife and his son presented to the ED by daughter with c/o cough that developed yesterday but was more alert than usual. Patient is very poor historian due to underlying dementia. Most of the information has been obtained from EMR documentation. in d/w ED provider and daughter on phone.  Per daughter, patient was more lethargic with chills and rigor and his body was warn than his baseline. Reportedly, patient goes daily to day program at [facility].. Palliative medicine consulted to help establish GOC and advance care planning     11/4/2024 pt seen and examined       PAIN: ( )Yes   ( )No  Level:  Location:  Intensity:    /10  Quality:  Aggravating Factors:  Alleviating Factors:  Radiation:  Duration/Timing:  Impact on ADLs:    DYSPNEA: ( ) Yes  ( ) No  Level:    PAST MEDICAL & SURGICAL HISTORY:  Hypertension  Anxiety  Hyperlipidemia  OA (osteoarthritis)  Dementia  Depression  BPH (benign prostatic hyperplasia)  History of tonsillectomy  H/O total knee replacement, right    SOCIAL HX: home with wife and son     Hx opiate tolerance ( )YES  (x )NO    Baseline ADLs  (Prior to Admission)  ( ) Independent   ( x)Dependent    FAMILY HISTORY:  No pertinent family history    Review of Systems:    Unable to obtain/Limited due to: Dementia     PHYSICAL EXAM:    Vital Signs Last 24 Hrs  T(C): 37 (04 Nov 2024 07:22), Max: 37.6 (03 Nov 2024 22:25)  T(F): 98.6 (04 Nov 2024 07:22), Max: 99.7 (03 Nov 2024 22:25)  HR: 53 (04 Nov 2024 07:22) (53 - 83)  BP: 145/71 (04 Nov 2024 07:22) (124/82 - 154/68)  RR: 18 (04 Nov 2024 07:22) (18 - 18)  SpO2: 96% (04 Nov 2024 07:22) (94% - 98%)    Parameters below as of 04 Nov 2024 07:22  Patient On (Oxygen Delivery Method): room air    PPSV2:   %  FAST:    General:  Mental Status:  HEENT:  Lungs:  Cardiac:  GI:  :  Ext:  Neuro:    LABS:                        10.2   5.14  )-----------( 135      ( 04 Nov 2024 06:18 )             30.4     11-04    142  |  113[H]  |  31[H]  ----------------------------<  133[H]  3.9   |  26  |  1.18    Ca    8.8      04 Nov 2024 06:18    Albumin: Albumin: 3.5 g/dL (10-31 @ 22:02)    Allergies - No Known Allergies    Intolerances    MEDICATIONS  (STANDING):  cefepime  Injectable. 1000 milliGRAM(s) IV Push every 12 hours  doxycycline monohydrate Suspension 100 milliGRAM(s) Oral every 12 hours  gabapentin 600 milliGRAM(s) Oral at bedtime  heparin   Injectable 5000 Unit(s) SubCutaneous every 8 hours  lactobacillus acidophilus 1 Tablet(s) Oral daily  losartan 50 milliGRAM(s) Oral daily  mirtazapine 15 milliGRAM(s) Oral at bedtime  pancrelipase  (CREON 12,000 Lipase Units) 4 Capsule(s) Oral three times a day with meals  polyethylene glycol 3350 17 Gram(s) Oral two times a day  potassium chloride    Tablet ER 10 milliEquivalent(s) Oral daily  senna 2 Tablet(s) Oral at bedtime  sertraline 25 milliGRAM(s) Oral daily  tamsulosin 0.4 milliGRAM(s) Oral at bedtime    MEDICATIONS  (PRN):  acetaminophen     Tablet .. 650 milliGRAM(s) Oral every 6 hours PRN Temp greater or equal to 38C (100.4F), Mild Pain (1 - 3)  aluminum hydroxide/magnesium hydroxide/simethicone Suspension 30 milliLiter(s) Oral every 4 hours PRN Dyspepsia  guaifenesin/dextromethorphan Oral Liquid 10 milliLiter(s) Oral every 4 hours PRN Cough  melatonin 3 milliGRAM(s) Oral at bedtime PRN Insomnia  ondansetron Injectable 4 milliGRAM(s) IV Push every 8 hours PRN Nausea and/or Vomiting  QUEtiapine 25 milliGRAM(s) Oral at bedtime PRN agitation      RADIOLOGY/ADDITIONAL STUDIES:    ACC: [id number] EXAM:  CT CHEST   ORDERED BY: ANA MOJICA   PROCEDURE DATE:  10/31/2024    INTERPRETATION:  EXAMINATION: CT CHEST  CLINICAL INDICATION: Cough.  TECHNIQUE: Noncontrast CT of the chest was obtained.  COMPARISON:4/25/2022.  FINDINGS:  AIRWAYS AND LUNGS: Tracheal secretions.  Patchy and linear bilateral   lower lobe opacities.  MEDIASTINUM AND PLEURA: There are no enlarged mediastinal, hilar or   axillary lymph nodes. The visualized portion of the thyroid gland is   unremarkable. There is no pleural effusion. There is no pneumothorax.  HEART AND VESSELS: There is mild cardiomegaly.  There are atherosclerotic   calcifications of the aorta and coronary arteries.  There is no   pericardial effusion.Aortic valve calcification.  UPPER ABDOMEN: Images of the upper abdomen demonstrate cholelithiasis.   Hepatic cyst.  BONES AND SOFT TISSUES: The bones are unremarkable.  The soft tissues are   unremarkable.    IMPRESSION:  Findings likely represent bibasilar atelectasis and superimposed   infectious/inflammatory process. Short-term follow-up CT needed for   complete evaluation.      ACC: [id number] EXAM:  XR CHEST PORTABLE URGENT 1V   ORDERED BY: ANA MOJICA   PROCEDURE DATE:  10/31/2024    INTERPRETATION:  Sepsis.  AP chest. Prior 7/16/2024.  IMPRESSION:  Cardiac silhouette exaggerated by AP film shallow inspiration, unchanged.   Left basilar atelectasis. Correlate clinically for concomitant infection.   No pleural effusion or pneumothorax.     HPI: Pt is a 89y old Male with hx of dementia,  HTN, R eye blindness, anxiety, depression, BPH, who lives with his wife and his son presented to the ED by daughter with c/o cough that developed yesterday but was more alert than usual. Patient is very poor historian due to underlying dementia. Most of the information has been obtained from EMR documentation. in d/w ED provider and daughter on phone.  Per daughter, patient was more lethargic with chills and rigor and his body was warn than his baseline. Reportedly, patient goes daily to day program at [facility].. Palliative medicine consulted to help establish GOC and advance care planning     11/4/2024 pt seen and examined with no family at bedside, patient lethargic, answers yes no questions but did not tell me his name or where he is. Will reach out to family to schedule GOC meeting       PAIN: ( )Yes   (x )No  pt appears comfortable   DYSPNEA: ( ) Yes  (x ) No    PAST MEDICAL & SURGICAL HISTORY:  Hypertension  Anxiety  Hyperlipidemia  OA (osteoarthritis)  Dementia  Depression  BPH (benign prostatic hyperplasia)  History of tonsillectomy  H/O total knee replacement, right    SOCIAL HX: home with wife and son     Hx opiate tolerance ( )YES  (x )NO    Baseline ADLs  (Prior to Admission)  ( ) Independent   ( x)Dependent    FAMILY HISTORY:  No pertinent family history    Review of Systems:    Unable to obtain/Limited due to: Dementia     PHYSICAL EXAM:    Vital Signs Last 24 Hrs  T(C): 37 (04 Nov 2024 07:22), Max: 37.6 (03 Nov 2024 22:25)  T(F): 98.6 (04 Nov 2024 07:22), Max: 99.7 (03 Nov 2024 22:25)  HR: 53 (04 Nov 2024 07:22) (53 - 83)  BP: 145/71 (04 Nov 2024 07:22) (124/82 - 154/68)  RR: 18 (04 Nov 2024 07:22) (18 - 18)  SpO2: 96% (04 Nov 2024 07:22) (94% - 98%)    Parameters below as of 04 Nov 2024 07:22  Patient On (Oxygen Delivery Method): room air    PPSV2: 20  %  FAST: unsure of baseline     General: confused elderly male in NAD  Mental Status: lethargic   HEENT: dry oral mucosa  Lungs: cta b/l  Cardiac: regular rate and rhythm S1S2  GI: abdomen slightly distended +bsx4  : no suprapubic tenderness  Ext: PAGE x4  Neuro: Dementia     LABS:                        10.2   5.14  )-----------( 135      ( 04 Nov 2024 06:18 )             30.4     11-04    142  |  113[H]  |  31[H]  ----------------------------<  133[H]  3.9   |  26  |  1.18    Ca    8.8      04 Nov 2024 06:18    Albumin: Albumin: 3.5 g/dL (10-31 @ 22:02)    Allergies - No Known Allergies    Intolerances    MEDICATIONS  (STANDING):  cefepime  Injectable. 1000 milliGRAM(s) IV Push every 12 hours  doxycycline monohydrate Suspension 100 milliGRAM(s) Oral every 12 hours  gabapentin 600 milliGRAM(s) Oral at bedtime  heparin   Injectable 5000 Unit(s) SubCutaneous every 8 hours  lactobacillus acidophilus 1 Tablet(s) Oral daily  losartan 50 milliGRAM(s) Oral daily  mirtazapine 15 milliGRAM(s) Oral at bedtime  pancrelipase  (CREON 12,000 Lipase Units) 4 Capsule(s) Oral three times a day with meals  polyethylene glycol 3350 17 Gram(s) Oral two times a day  potassium chloride    Tablet ER 10 milliEquivalent(s) Oral daily  senna 2 Tablet(s) Oral at bedtime  sertraline 25 milliGRAM(s) Oral daily  tamsulosin 0.4 milliGRAM(s) Oral at bedtime    MEDICATIONS  (PRN):  acetaminophen     Tablet .. 650 milliGRAM(s) Oral every 6 hours PRN Temp greater or equal to 38C (100.4F), Mild Pain (1 - 3)  aluminum hydroxide/magnesium hydroxide/simethicone Suspension 30 milliLiter(s) Oral every 4 hours PRN Dyspepsia  guaifenesin/dextromethorphan Oral Liquid 10 milliLiter(s) Oral every 4 hours PRN Cough  melatonin 3 milliGRAM(s) Oral at bedtime PRN Insomnia  ondansetron Injectable 4 milliGRAM(s) IV Push every 8 hours PRN Nausea and/or Vomiting  QUEtiapine 25 milliGRAM(s) Oral at bedtime PRN agitation      RADIOLOGY/ADDITIONAL STUDIES:    ACC: [id number] EXAM:  CT CHEST   ORDERED BY: ANA MOJICA   PROCEDURE DATE:  10/31/2024    INTERPRETATION:  EXAMINATION: CT CHEST  CLINICAL INDICATION: Cough.  TECHNIQUE: Noncontrast CT of the chest was obtained.  COMPARISON:4/25/2022.  FINDINGS:  AIRWAYS AND LUNGS: Tracheal secretions.  Patchy and linear bilateral   lower lobe opacities.  MEDIASTINUM AND PLEURA: There are no enlarged mediastinal, hilar or   axillary lymph nodes. The visualized portion of the thyroid gland is   unremarkable. There is no pleural effusion. There is no pneumothorax.  HEART AND VESSELS: There is mild cardiomegaly.  There are atherosclerotic   calcifications of the aorta and coronary arteries.  There is no   pericardial effusion.Aortic valve calcification.  UPPER ABDOMEN: Images of the upper abdomen demonstrate cholelithiasis.   Hepatic cyst.  BONES AND SOFT TISSUES: The bones are unremarkable.  The soft tissues are   unremarkable.    IMPRESSION:  Findings likely represent bibasilar atelectasis and superimposed   infectious/inflammatory process. Short-term follow-up CT needed for   complete evaluation.      ACC: [id number] EXAM:  XR CHEST PORTABLE URGENT 1V   ORDERED BY: ANA MOJICA   PROCEDURE DATE:  10/31/2024    INTERPRETATION:  Sepsis.  AP chest. Prior 7/16/2024.  IMPRESSION:  Cardiac silhouette exaggerated by AP film shallow inspiration, unchanged.   Left basilar atelectasis. Correlate clinically for concomitant infection.   No pleural effusion or pneumothorax.     HPI: Pt is a 89y old Male with hx of dementia,  HTN, R eye blindness, anxiety, depression, BPH, who lives with his wife and his son presented to the ED by daughter with c/o cough that developed yesterday but was more alert than usual. Patient is very poor historian due to underlying dementia. Most of the information has been obtained from EMR documentation. in d/w ED provider and daughter on phone.  Per daughter, patient was more lethargic with chills and rigor and his body was warn than his baseline. Reportedly, patient goes daily to day program [facility].. Palliative medicine consulted to help establish GOC and advance care planning     11/4/2024 pt seen and examined with no family at bedside, patient lethargic, answers yes no questions but did not tell me his name or where he is. Will reach out to family to schedule GOC meeting. Spoke with patients wife who is health care surrogate - she defer decisions to her children and ok with either one but at this time states to call her daughter first     PAIN: ( )Yes   (x )No  pt appears comfortable   DYSPNEA: ( ) Yes  (x ) No    PAST MEDICAL & SURGICAL HISTORY:  Hypertension  Anxiety  Hyperlipidemia  OA (osteoarthritis)  Dementia  Depression  BPH (benign prostatic hyperplasia)  History of tonsillectomy  H/O total knee replacement, right    SOCIAL HX: home with wife and son     Hx opiate tolerance ( )YES  (x )NO    Baseline ADLs  (Prior to Admission)  ( ) Independent   ( x)Dependent    FAMILY HISTORY:  No pertinent family history    Review of Systems:    Unable to obtain/Limited due to: Dementia     PHYSICAL EXAM:    Vital Signs Last 24 Hrs  T(C): 37 (04 Nov 2024 07:22), Max: 37.6 (03 Nov 2024 22:25)  T(F): 98.6 (04 Nov 2024 07:22), Max: 99.7 (03 Nov 2024 22:25)  HR: 53 (04 Nov 2024 07:22) (53 - 83)  BP: 145/71 (04 Nov 2024 07:22) (124/82 - 154/68)  RR: 18 (04 Nov 2024 07:22) (18 - 18)  SpO2: 96% (04 Nov 2024 07:22) (94% - 98%)    Parameters below as of 04 Nov 2024 07:22  Patient On (Oxygen Delivery Method): room air    PPSV2: 20  %  FAST: unsure of baseline     General: confused elderly male in NAD  Mental Status: lethargic   HEENT: dry oral mucosa  Lungs: cta b/l  Cardiac: regular rate and rhythm S1S2  GI: abdomen slightly distended +bsx4  : no suprapubic tenderness  Ext: PAGE x4  Neuro: Dementia     LABS:                        10.2   5.14  )-----------( 135      ( 04 Nov 2024 06:18 )             30.4     11-04    142  |  113[H]  |  31[H]  ----------------------------<  133[H]  3.9   |  26  |  1.18    Ca    8.8      04 Nov 2024 06:18    Albumin: Albumin: 3.5 g/dL (10-31 @ 22:02)    Allergies - No Known Allergies    Intolerances    MEDICATIONS  (STANDING):  cefepime  Injectable. 1000 milliGRAM(s) IV Push every 12 hours  doxycycline monohydrate Suspension 100 milliGRAM(s) Oral every 12 hours  gabapentin 600 milliGRAM(s) Oral at bedtime  heparin   Injectable 5000 Unit(s) SubCutaneous every 8 hours  lactobacillus acidophilus 1 Tablet(s) Oral daily  losartan 50 milliGRAM(s) Oral daily  mirtazapine 15 milliGRAM(s) Oral at bedtime  pancrelipase  (CREON 12,000 Lipase Units) 4 Capsule(s) Oral three times a day with meals  polyethylene glycol 3350 17 Gram(s) Oral two times a day  potassium chloride    Tablet ER 10 milliEquivalent(s) Oral daily  senna 2 Tablet(s) Oral at bedtime  sertraline 25 milliGRAM(s) Oral daily  tamsulosin 0.4 milliGRAM(s) Oral at bedtime    MEDICATIONS  (PRN):  acetaminophen     Tablet .. 650 milliGRAM(s) Oral every 6 hours PRN Temp greater or equal to 38C (100.4F), Mild Pain (1 - 3)  aluminum hydroxide/magnesium hydroxide/simethicone Suspension 30 milliLiter(s) Oral every 4 hours PRN Dyspepsia  guaifenesin/dextromethorphan Oral Liquid 10 milliLiter(s) Oral every 4 hours PRN Cough  melatonin 3 milliGRAM(s) Oral at bedtime PRN Insomnia  ondansetron Injectable 4 milliGRAM(s) IV Push every 8 hours PRN Nausea and/or Vomiting  QUEtiapine 25 milliGRAM(s) Oral at bedtime PRN agitation      RADIOLOGY/ADDITIONAL STUDIES:    ACC: [id number] EXAM:  CT CHEST   ORDERED BY: ANA MOJICA   PROCEDURE DATE:  10/31/2024    INTERPRETATION:  EXAMINATION: CT CHEST  CLINICAL INDICATION: Cough.  TECHNIQUE: Noncontrast CT of the chest was obtained.  COMPARISON:4/25/2022.  FINDINGS:  AIRWAYS AND LUNGS: Tracheal secretions.  Patchy and linear bilateral   lower lobe opacities.  MEDIASTINUM AND PLEURA: There are no enlarged mediastinal, hilar or   axillary lymph nodes. The visualized portion of the thyroid gland is   unremarkable. There is no pleural effusion. There is no pneumothorax.  HEART AND VESSELS: There is mild cardiomegaly.  There are atherosclerotic   calcifications of the aorta and coronary arteries.  There is no   pericardial effusion.Aortic valve calcification.  UPPER ABDOMEN: Images of the upper abdomen demonstrate cholelithiasis.   Hepatic cyst.  BONES AND SOFT TISSUES: The bones are unremarkable.  The soft tissues are   unremarkable.    IMPRESSION:  Findings likely represent bibasilar atelectasis and superimposed   infectious/inflammatory process. Short-term follow-up CT needed for   complete evaluation.      ACC: [id number] EXAM:  XR CHEST PORTABLE URGENT 1V   ORDERED BY: ANA MOJICA   PROCEDURE DATE:  10/31/2024    INTERPRETATION:  Sepsis.  AP chest. Prior 7/16/2024.  IMPRESSION:  Cardiac silhouette exaggerated by AP film shallow inspiration, unchanged.   Left basilar atelectasis. Correlate clinically for concomitant infection.   No pleural effusion or pneumothorax.

## 2024-11-05 ENCOUNTER — TRANSCRIPTION ENCOUNTER (OUTPATIENT)
Age: 89
End: 2024-11-05

## 2024-11-05 LAB
ANION GAP SERPL CALC-SCNC: 5 MMOL/L — SIGNIFICANT CHANGE UP (ref 5–17)
BUN SERPL-MCNC: 26 MG/DL — HIGH (ref 7–23)
CALCIUM SERPL-MCNC: 9.2 MG/DL — SIGNIFICANT CHANGE UP (ref 8.5–10.1)
CHLORIDE SERPL-SCNC: 114 MMOL/L — HIGH (ref 96–108)
CO2 SERPL-SCNC: 26 MMOL/L — SIGNIFICANT CHANGE UP (ref 22–31)
CREAT SERPL-MCNC: 1.17 MG/DL — SIGNIFICANT CHANGE UP (ref 0.5–1.3)
EGFR: 58 ML/MIN/1.73M2 — LOW
GLUCOSE SERPL-MCNC: 111 MG/DL — HIGH (ref 70–99)
HCT VFR BLD CALC: 32.4 % — LOW (ref 39–50)
HGB BLD-MCNC: 10.9 G/DL — LOW (ref 13–17)
MCHC RBC-ENTMCNC: 33.3 PG — SIGNIFICANT CHANGE UP (ref 27–34)
MCHC RBC-ENTMCNC: 33.6 G/DL — SIGNIFICANT CHANGE UP (ref 32–36)
MCV RBC AUTO: 99.1 FL — SIGNIFICANT CHANGE UP (ref 80–100)
PLATELET # BLD AUTO: 138 K/UL — LOW (ref 150–400)
POTASSIUM SERPL-MCNC: 4 MMOL/L — SIGNIFICANT CHANGE UP (ref 3.5–5.3)
POTASSIUM SERPL-SCNC: 4 MMOL/L — SIGNIFICANT CHANGE UP (ref 3.5–5.3)
RBC # BLD: 3.27 M/UL — LOW (ref 4.2–5.8)
RBC # FLD: 12.4 % — SIGNIFICANT CHANGE UP (ref 10.3–14.5)
SODIUM SERPL-SCNC: 145 MMOL/L — SIGNIFICANT CHANGE UP (ref 135–145)
WBC # BLD: 5.38 K/UL — SIGNIFICANT CHANGE UP (ref 3.8–10.5)
WBC # FLD AUTO: 5.38 K/UL — SIGNIFICANT CHANGE UP (ref 3.8–10.5)

## 2024-11-05 PROCEDURE — 99232 SBSQ HOSP IP/OBS MODERATE 35: CPT

## 2024-11-05 RX ADMIN — GABAPENTIN 600 MILLIGRAM(S): 300 CAPSULE ORAL at 22:56

## 2024-11-05 RX ADMIN — PANCRELIPASE 4 CAPSULE(S): 16800; 98400; 56800 CAPSULE, DELAYED RELEASE ORAL at 12:12

## 2024-11-05 RX ADMIN — Medication 10 MILLIEQUIVALENT(S): at 12:11

## 2024-11-05 RX ADMIN — POLYETHYLENE GLYCOL 3350 17 GRAM(S): 17 POWDER, FOR SOLUTION ORAL at 22:57

## 2024-11-05 RX ADMIN — PANCRELIPASE 4 CAPSULE(S): 16800; 98400; 56800 CAPSULE, DELAYED RELEASE ORAL at 13:58

## 2024-11-05 RX ADMIN — LOSARTAN POTASSIUM 50 MILLIGRAM(S): 25 TABLET ORAL at 12:13

## 2024-11-05 RX ADMIN — Medication 2 TABLET(S): at 22:57

## 2024-11-05 RX ADMIN — CEFEPIME 1000 MILLIGRAM(S): 2 INJECTION, POWDER, FOR SOLUTION INTRAVENOUS at 12:13

## 2024-11-05 RX ADMIN — HEPARIN SODIUM 5000 UNIT(S): 10000 INJECTION INTRAVENOUS; SUBCUTANEOUS at 13:59

## 2024-11-05 RX ADMIN — MIRTAZAPINE 15 MILLIGRAM(S): 30 TABLET ORAL at 22:57

## 2024-11-05 RX ADMIN — SERTRALINE HYDROCHLORIDE 25 MILLIGRAM(S): 50 TABLET, FILM COATED ORAL at 12:11

## 2024-11-05 RX ADMIN — HEPARIN SODIUM 5000 UNIT(S): 10000 INJECTION INTRAVENOUS; SUBCUTANEOUS at 05:26

## 2024-11-05 RX ADMIN — HEPARIN SODIUM 5000 UNIT(S): 10000 INJECTION INTRAVENOUS; SUBCUTANEOUS at 22:57

## 2024-11-05 RX ADMIN — DOXYCYCLINE HYCLATE 100 MILLIGRAM(S): 100 TABLET, FILM COATED ORAL at 13:45

## 2024-11-05 RX ADMIN — POLYETHYLENE GLYCOL 3350 17 GRAM(S): 17 POWDER, FOR SOLUTION ORAL at 12:13

## 2024-11-05 RX ADMIN — PANCRELIPASE 4 CAPSULE(S): 16800; 98400; 56800 CAPSULE, DELAYED RELEASE ORAL at 18:27

## 2024-11-05 RX ADMIN — CEFEPIME 1000 MILLIGRAM(S): 2 INJECTION, POWDER, FOR SOLUTION INTRAVENOUS at 22:57

## 2024-11-05 RX ADMIN — Medication 1 TABLET(S): at 12:11

## 2024-11-05 RX ADMIN — DOXYCYCLINE HYCLATE 100 MILLIGRAM(S): 100 TABLET, FILM COATED ORAL at 22:57

## 2024-11-05 RX ADMIN — Medication 0.4 MILLIGRAM(S): at 22:57

## 2024-11-05 NOTE — PROGRESS NOTE ADULT - REASON FOR ADMISSION
Pt here for C23D1 opdivo.   Arrives , accompanied by Spouse           Modifications in dose or schedule: No    Patient states he is stable and denies any health  changes      Frequency of blood return and site check throughout administration: Prior to [facility]
Sepsis due to HCAP

## 2024-11-05 NOTE — DISCHARGE NOTE NURSING/CASE MANAGEMENT/SOCIAL WORK - NSDCPEFALRISK_GEN_ALL_CORE
For information on Fall & Injury Prevention, visit: [email or web address] OR  [email or web address] OR  https://www.cdc.gov/steadi/patient.html

## 2024-11-05 NOTE — PROGRESS NOTE ADULT - SUBJECTIVE AND OBJECTIVE BOX
HOSPITALIST ATTENDING PROGRESS NOTE    Chart and meds reviewed.  Patient seen and examined.    CC: fever    Subjective: Overnight pt passed TOV. Plan for dc tomorrow    All other systems reviewed and found to be negative with the exception of what has been described above.    MEDICATIONS  (STANDING):  cefepime  Injectable. 1000 milliGRAM(s) IV Push every 12 hours  doxycycline monohydrate Suspension 100 milliGRAM(s) Oral every 12 hours  gabapentin 600 milliGRAM(s) Oral at bedtime  heparin   Injectable 5000 Unit(s) SubCutaneous every 8 hours  lactobacillus acidophilus 1 Tablet(s) Oral daily  losartan 50 milliGRAM(s) Oral daily  mirtazapine 15 milliGRAM(s) Oral at bedtime  pancrelipase  (CREON 12,000 Lipase Units) 4 Capsule(s) Oral three times a day with meals  polyethylene glycol 3350 17 Gram(s) Oral two times a day  potassium chloride    Tablet ER 10 milliEquivalent(s) Oral daily  senna 2 Tablet(s) Oral at bedtime  sertraline 25 milliGRAM(s) Oral daily  tamsulosin 0.4 milliGRAM(s) Oral at bedtime    MEDICATIONS  (PRN):  acetaminophen     Tablet .. 650 milliGRAM(s) Oral every 6 hours PRN Temp greater or equal to 38C (100.4F), Mild Pain (1 - 3)  aluminum hydroxide/magnesium hydroxide/simethicone Suspension 30 milliLiter(s) Oral every 4 hours PRN Dyspepsia  guaifenesin/dextromethorphan Oral Liquid 10 milliLiter(s) Oral every 4 hours PRN Cough  melatonin 3 milliGRAM(s) Oral at bedtime PRN Insomnia  ondansetron Injectable 4 milliGRAM(s) IV Push every 8 hours PRN Nausea and/or Vomiting  QUEtiapine 25 milliGRAM(s) Oral at bedtime PRN agitation      VITALS:  T(F): 97.9 (11-05-24 @ 07:21), Max: 98.4 (11-04-24 @ 22:21)  HR: 64 (11-05-24 @ 07:21) (64 - 64)  BP: 156/66 (11-05-24 @ 07:21) (156/66 - 156/66)  RR: 18 (11-05-24 @ 07:21) (18 - 18)  SpO2: 94% (11-05-24 @ 07:21) (94% - 94%)  Wt(kg): --    I&O's Summary    04 Nov 2024 07:01  -  05 Nov 2024 07:00  --------------------------------------------------------  IN: 0 mL / OUT: 400 mL / NET: -400 mL        CAPILLARY BLOOD GLUCOSE        PHYSICAL EXAM:  Gen: NAD  HEENT: right pupil enucleation   NECK:   supple, no carotid bruits, no palpable lymph nodes, no thyromegaly  CV:  +S1, +S2, regular, no murmurs or rubs  RESP:   lungs clear to auscultation bilaterally, no wheezing, rales, rhonchi, good air entry bilaterally  BREAST:  not examined  GI:  abdomen soft, non-tender, non-distended, normal BS, no bruits, no abdominal masses, no palpable masses  RECTAL:  not examined  :  not examined  MSK:   normal muscle tone, no atrophy, no rigidity, no contractions  EXT:  no clubbing, no cyanosis, no edema, no calf pain, swelling or erythema  VASCULAR:  pulses equal and symmetric in the upper and lower extremities  NEURO:  AAOX1 follows commands   SKIN:  no ulcers, lesions or rashes      LABS:                            10.9   5.38  )-----------( 138      ( 05 Nov 2024 07:21 )             32.4     11-05    145  |  114[H]  |  26[H]  ----------------------------<  111[H]  4.0   |  26  |  1.17    Ca    9.2      05 Nov 2024 07:21              Urinalysis Basic - ( 05 Nov 2024 07:21 )    Color: x / Appearance: x / SG: x / pH: x  Gluc: 111 mg/dL / Ketone: x  / Bili: x / Urobili: x   Blood: x / Protein: x / Nitrite: x   Leuk Esterase: x / RBC: x / WBC x   Sq Epi: x / Non Sq Epi: x / Bacteria: x              CULTURES:      Additional results/Imaging, I have personally reviewed:    Telemetry, personally reviewed:

## 2024-11-05 NOTE — PROGRESS NOTE ADULT - NUTRITIONAL ASSESSMENT
This patient has been assessed with a concern for Malnutrition and has been determined to have a diagnosis/diagnoses of Moderate protein-calorie malnutrition.    This patient is being managed with:   Diet Regular-  Entered: Nov 1 2024  3:39AM  

## 2024-11-05 NOTE — PROGRESS NOTE ADULT - ASSESSMENT
Assessment	  The patient is a 89y Male with significant PMH of dementia,  HTN, R eye blindness, anxiety, depression, BPH, who lives with his wife and his son presented to the ED by daughter with c/o cough that developed yesterday but was more alert than usual. Patient is very poor historian due to underlying dementia. Most of the information has been obtained from EMR documentation. in d/w ED provider and daughter on phone.  Per daughter, patient was more lethargic with chills and rigor and his body was warn than his baseline. Reportedly, patient goes daily to day program at [facility].     # HCAP of both lower lobes Lt > Rt.  -Leukocytosis with left shift. Elevated lactate level, now resolved.  -Admit to medical floor.  -Follow blood and sputum cultures.  -S/p vanc zosyn   -Tylenol and Robitussin prn.  -ID consulted appreciate recs   -trend fever, wbc curve   -cw cefepime 1g q12 hrs and doxy 100 mg bid   - per ID ok to switch to po abx on discharge     # CKD-stage IIIa.  -Check BMP daily     # HTN .  -On Losartan.    # Anxiety and depression.  -C/w his Sertraline and Mirtazapine.    # OA.  -On Gabapentin.    # H/o BPH and urinary incontinence.  -oxybutynin held as could be causing urinary retention   -start tamsulosin 0.4 mg   - pt passed trial and void overnight     # DVT prophylaxis: Heparin sq.  #NOK son Guero

## 2024-11-05 NOTE — DISCHARGE NOTE NURSING/CASE MANAGEMENT/SOCIAL WORK - FINANCIAL ASSISTANCE
[facility] provides services at a reduced cost to those who are determined to be eligible through [facility]’s financial assistance program. Information regarding [facility]’s financial assistance program can be found by going to [email or web address] or by calling [phone].

## 2024-11-05 NOTE — PROGRESS NOTE ADULT - ASSESSMENT
89y Male with significant PMH of dementia,  HTN, R eye blindness, anxiety, depression, BPH, who lives with his wife and his son presented to the ED by daughter with c/o cough that developed day prior to admit, but was more alert than usual. Patient is very poor historian due to underlying dementia. Most of the information has been obtained from EMR documentation. in d/w ED provider and daughter on phone.  Per daughter, patient was more lethargic with chills and rigor and his body was warn than his baseline. Reportedly, patient goes daily to day program at [facility].  He is comfortably laying in his bed and no obvious distress. CT chest shows: IMPRESSION: Patchy densities at the lung bases may reflect atelectasis, edema or pneumonitis. Sig labs: Leukocytosis of 11.36k with N 87% and elevated lactate level 2.2 which came down to 1.6 after hydration. BUN 34, Cr 1.37 (was 1.28 on 07/19/2024).  UA and RVP negative. Vancomycin and Cefepime IV given in ED after blood culture draw    1. Sepsis. Cough. Pneumonia. Alzheimers dementia. CKD  - imaging reviewed  - on cefepime [id number] #5  - on doxycycline 100mg bid #5  - continue with antibiotic coverage  - monitor temps  - tolerating abx well so far; no side effects noted  - reason for abx use and side effects reviewed with patient  - supportive care  - fu cbc  - f/u cultures      Clinical team may change from intravenous to oral antibiotics when the following criteria are met:   1. Patient is clinically improving/stable       a)	Improved signs and symptoms of infection from initial presentation       b)	Afebrile for 24 hours       c)	Leukocytosis trending towards normal range   2. Patient is tolerating oral intake   3. Initial/repeat blood cultures are negative    When above criteria met  may change iv antibiotics to: po doxy/ceftin x 10 days total include abx here

## 2024-11-05 NOTE — DISCHARGE NOTE NURSING/CASE MANAGEMENT/SOCIAL WORK - PATIENT PORTAL LINK FT
You can access the FollowMyHealth Patient Portal offered by [facility] by registering at the following website: [email or web address]. By joining [employer]’s FollowMyHealth portal, you will also be able to view your health information using other applications (apps) compatible with our system.

## 2024-11-05 NOTE — PROGRESS NOTE ADULT - SUBJECTIVE AND OBJECTIVE BOX
Date of service: 11-05-24 @ 13:35    pt seen and examined  confused  no distress   afebrile    ROS: unable to obtain d/t medical condition       MEDICATIONS  (STANDING):  cefepime  Injectable. 1000 milliGRAM(s) IV Push every 12 hours  doxycycline monohydrate Suspension 100 milliGRAM(s) Oral every 12 hours  gabapentin 600 milliGRAM(s) Oral at bedtime  heparin   Injectable 5000 Unit(s) SubCutaneous every 8 hours  lactobacillus acidophilus 1 Tablet(s) Oral daily  losartan 50 milliGRAM(s) Oral daily  mirtazapine 15 milliGRAM(s) Oral at bedtime  pancrelipase  (CREON 12,000 Lipase Units) 4 Capsule(s) Oral three times a day with meals  polyethylene glycol 3350 17 Gram(s) Oral two times a day  potassium chloride    Tablet ER 10 milliEquivalent(s) Oral daily  senna 2 Tablet(s) Oral at bedtime  sertraline 25 milliGRAM(s) Oral daily  tamsulosin 0.4 milliGRAM(s) Oral at bedtime    Vital Signs Last 24 Hrs  T(C): 36.6 (05 Nov 2024 07:21), Max: 37.1 (04 Nov 2024 15:18)  T(F): 97.9 (05 Nov 2024 07:21), Max: 98.8 (04 Nov 2024 15:18)  HR: 64 (05 Nov 2024 07:21) (53 - 64)  BP: 156/66 (05 Nov 2024 07:21) (119/56 - 156/66)  BP(mean): --  RR: 18 (05 Nov 2024 07:21) (18 - 18)  SpO2: 94% (05 Nov 2024 07:21) (94% - 97%)    Parameters below as of 05 Nov 2024 07:21  Patient On (Oxygen Delivery Method): room air            PE:  Constitutional: NAD  HEENT: NC/AT, EOMI, PERRLA, conjunctivae clear; ears and nose atraumatic; pharynx benign  Neck: supple; thyroid not palpable  Back: no tenderness  Respiratory: decreased breath sounds rhonchi   Cardiovascular: S1S2 regular, no murmurs  Abdomen: soft, not tender, not distended, positive BS; liver and spleen WNL  Genitourinary: no suprapubic tenderness  Lymphatic: no LN palpable  Musculoskeletal: no muscle tenderness, no joint swelling or tenderness  Extremities: no pedal edema  Neurological/ Psychiatric:  moving all extremities  Skin: no rashes; no palpable lesions    Labs: all available labs reviewed                                              10.9   5.38  )-----------( 138      ( 05 Nov 2024 07:21 )             32.4     11-05    145  |  114[H]  |  26[H]  ----------------------------<  111[H]  4.0   |  26  |  1.17    Ca    9.2      05 Nov 2024 07:21      Urinalysis Basic - ( 01 Nov 2024 04:59 )    Color: x / Appearance: x / SG: x / pH: x  Gluc: 136 mg/dL / Ketone: x  / Bili: x / Urobili: x   Blood: x / Protein: x / Nitrite: x   Leuk Esterase: x / RBC: x / WBC x   Sq Epi: x / Non Sq Epi: x / Bacteria: x    Culture - Blood (10.31.24 @ 22:02)   Specimen Source: .Blood BLOOD  Culture Results:   No growth at 72 Hours  Culture - Blood (10.31.24 @ 22:02)   Specimen Source: .Blood BLOOD  Culture Results:   No growth at 72 Hours    Radiology: all available radiological tests reviewed      ACC: [id number] EXAM:  CT CHEST   ORDERED BY: ANA MOJICA     PROCEDURE DATE:  10/31/2024          INTERPRETATION:  EXAMINATION: CT CHEST    CLINICAL INDICATION: Cough.    TECHNIQUE: Noncontrast CT of the chest was obtained.    COMPARISON:4/25/2022.    FINDINGS:    AIRWAYS AND LUNGS: Tracheal secretions.  Patchy and linear bilateral   lower lobe opacities.    MEDIASTINUM AND PLEURA: There are no enlarged mediastinal, hilar or   axillary lymph nodes. The visualized portion of the thyroid gland is   unremarkable. There is no pleural effusion. There is no pneumothorax.    HEART AND VESSELS: There is mild cardiomegaly.  There are atherosclerotic   calcifications of the aorta and coronary arteries.  There is no   pericardial effusion.Aortic valve calcification.    UPPER ABDOMEN: Images of the upper abdomen demonstrate cholelithiasis.   Hepatic cyst.    BONES AND SOFT TISSUES: The bones are unremarkable.  The soft tissues are   unremarkable.      IMPRESSION:  Findings likely represent bibasilar atelectasis and superimposed   infectious/inflammatory process. Short-term follow-up CT needed for   complete evaluation.      Advanced directives addressed: full resuscitation

## 2024-11-06 ENCOUNTER — TRANSCRIPTION ENCOUNTER (OUTPATIENT)
Age: 89
End: 2024-11-06

## 2024-11-06 VITALS
DIASTOLIC BLOOD PRESSURE: 70 MMHG | OXYGEN SATURATION: 96 % | TEMPERATURE: 98 F | RESPIRATION RATE: 18 BRPM | SYSTOLIC BLOOD PRESSURE: 147 MMHG | HEART RATE: 62 BPM

## 2024-11-06 LAB
ANION GAP SERPL CALC-SCNC: 5 MMOL/L — SIGNIFICANT CHANGE UP (ref 5–17)
BUN SERPL-MCNC: 34 MG/DL — HIGH (ref 7–23)
CALCIUM SERPL-MCNC: 9.3 MG/DL — SIGNIFICANT CHANGE UP (ref 8.5–10.1)
CHLORIDE SERPL-SCNC: 112 MMOL/L — HIGH (ref 96–108)
CO2 SERPL-SCNC: 26 MMOL/L — SIGNIFICANT CHANGE UP (ref 22–31)
CREAT SERPL-MCNC: 1.14 MG/DL — SIGNIFICANT CHANGE UP (ref 0.5–1.3)
CULTURE RESULTS: SIGNIFICANT CHANGE UP
CULTURE RESULTS: SIGNIFICANT CHANGE UP
EGFR: 60 ML/MIN/1.73M2 — SIGNIFICANT CHANGE UP
GLUCOSE SERPL-MCNC: 104 MG/DL — HIGH (ref 70–99)
HCT VFR BLD CALC: 32.8 % — LOW (ref 39–50)
HGB BLD-MCNC: 10.9 G/DL — LOW (ref 13–17)
MCHC RBC-ENTMCNC: 32.8 PG — SIGNIFICANT CHANGE UP (ref 27–34)
MCHC RBC-ENTMCNC: 33.2 G/DL — SIGNIFICANT CHANGE UP (ref 32–36)
MCV RBC AUTO: 98.8 FL — SIGNIFICANT CHANGE UP (ref 80–100)
PLATELET # BLD AUTO: 139 K/UL — LOW (ref 150–400)
POTASSIUM SERPL-MCNC: 4 MMOL/L — SIGNIFICANT CHANGE UP (ref 3.5–5.3)
POTASSIUM SERPL-SCNC: 4 MMOL/L — SIGNIFICANT CHANGE UP (ref 3.5–5.3)
RBC # BLD: 3.32 M/UL — LOW (ref 4.2–5.8)
RBC # FLD: 12.2 % — SIGNIFICANT CHANGE UP (ref 10.3–14.5)
SODIUM SERPL-SCNC: 143 MMOL/L — SIGNIFICANT CHANGE UP (ref 135–145)
SPECIMEN SOURCE: SIGNIFICANT CHANGE UP
SPECIMEN SOURCE: SIGNIFICANT CHANGE UP
WBC # BLD: 5.78 K/UL — SIGNIFICANT CHANGE UP (ref 3.8–10.5)
WBC # FLD AUTO: 5.78 K/UL — SIGNIFICANT CHANGE UP (ref 3.8–10.5)

## 2024-11-06 RX ORDER — CEFUROXIME AXETIL 250 MG
1 TABLET ORAL
Qty: 18 | Refills: 0
Start: 2024-11-06 | End: 2024-11-14

## 2024-11-06 RX ORDER — QUETIAPINE FUMARATE 200 MG/1
1 TABLET ORAL
Refills: 0 | DISCHARGE

## 2024-11-06 RX ORDER — TAMSULOSIN HCL 0.4 MG
1 CAPSULE ORAL
Qty: 0 | Refills: 0 | DISCHARGE
Start: 2024-11-06

## 2024-11-06 RX ORDER — TAMSULOSIN HCL 0.4 MG
1 CAPSULE ORAL
Qty: 15 | Refills: 0
Start: 2024-11-06 | End: 2024-11-20

## 2024-11-06 RX ADMIN — LOSARTAN POTASSIUM 50 MILLIGRAM(S): 25 TABLET ORAL at 11:36

## 2024-11-06 RX ADMIN — POLYETHYLENE GLYCOL 3350 17 GRAM(S): 17 POWDER, FOR SOLUTION ORAL at 11:37

## 2024-11-06 RX ADMIN — CEFEPIME 1000 MILLIGRAM(S): 2 INJECTION, POWDER, FOR SOLUTION INTRAVENOUS at 11:35

## 2024-11-06 RX ADMIN — HEPARIN SODIUM 5000 UNIT(S): 10000 INJECTION INTRAVENOUS; SUBCUTANEOUS at 06:49

## 2024-11-06 RX ADMIN — HEPARIN SODIUM 5000 UNIT(S): 10000 INJECTION INTRAVENOUS; SUBCUTANEOUS at 16:05

## 2024-11-06 RX ADMIN — Medication 1 TABLET(S): at 11:35

## 2024-11-06 RX ADMIN — Medication 10 MILLIEQUIVALENT(S): at 11:36

## 2024-11-06 RX ADMIN — DOXYCYCLINE HYCLATE 100 MILLIGRAM(S): 100 TABLET, FILM COATED ORAL at 11:37

## 2024-11-06 RX ADMIN — PANCRELIPASE 4 CAPSULE(S): 16800; 98400; 56800 CAPSULE, DELAYED RELEASE ORAL at 16:05

## 2024-11-06 RX ADMIN — SERTRALINE HYDROCHLORIDE 25 MILLIGRAM(S): 50 TABLET, FILM COATED ORAL at 11:36

## 2024-11-06 RX ADMIN — PANCRELIPASE 4 CAPSULE(S): 16800; 98400; 56800 CAPSULE, DELAYED RELEASE ORAL at 11:36

## 2024-11-06 NOTE — DISCHARGE NOTE PROVIDER - NSDCFUADDAPPT_GEN_ALL_CORE_FT
Dr. Phillips at [address], [place] on Thursday November 7th at 10:30am.    APPTS ARE READY TO BE MADE: [x] YES    Best Family or Patient Contact (if needed):    Additional Information about above appointments (if needed):    1: post hospital discharge follow up  2:   3:     Other comments or requests:    Dr. Phillips at [address], [place] on Thursday November 7th at 10:30am.    APPTS ARE READY TO BE MADE: [x] YES    Best Family or Patient Contact (if needed):    Additional Information about above appointments (if needed):    1: post hospital discharge follow up  2:   3:     Other comments or requests:     Caregiver advised they did not want to proceed with scheduling appointments with the providers on their referrals. They will coordinate care on their own.

## 2024-11-06 NOTE — DISCHARGE NOTE PROVIDER - CARE PROVIDER_API CALL
Enrike Chavis [place]  Pulmonary Disease  [address] [facility], [address] [place] [id number]  Phone: [phone]  Fax: [phone]  Established Patient  Follow Up Time: 1 week

## 2024-11-06 NOTE — DISCHARGE NOTE PROVIDER - NSDCCPCAREPLAN_GEN_ALL_CORE_FT
PRINCIPAL DISCHARGE DIAGNOSIS  Diagnosis: Severe sepsis  Assessment and Plan of Treatment: - secondary to pneumonia  - continue with 10 more days of po antibiotics  - you have been seen by ID while you have been hospitalizaed      SECONDARY DISCHARGE DIAGNOSES  Diagnosis: AMA (acute kidney injury)  Assessment and Plan of Treatment: - resolved    Diagnosis: Dementia  Assessment and Plan of Treatment: - supportive care

## 2024-11-06 NOTE — DISCHARGE NOTE PROVIDER - HOSPITAL COURSE
The patient is a 89y Male with significant PMH of dementia,  HTN, R eye blindness, anxiety, depression, BPH, who lives with his wife and his son presented to the ED by daughter with c/o cough that developed yesterday but was more alert than usual. Patient is very poor historian due to underlying dementia. Most of the information has been obtained from EMR documentation. in d/w ED provider and daughter on phone.  Per daughter, patient was more lethargic with chills and rigor and his body was warn than his baseline. Reportedly, patient goes daily to day program at [facility].          # Anxiety and depression.  -C/w his Sertraline and Mirtazapine.    # OA.  -On Gabapentin.    # H/o BPH and urinary incontinence.  -oxybutynin held as could be causing urinary retention   -start tamsulosin 0.4 mg   - pt passed trial and void overnight      The patient is a 89y Male with significant PMH of dementia,  HTN, R eye blindness, anxiety, depression, BPH, who lives with his wife and his son presented to the ED by daughter with c/o cough that developed yesterday but was more alert than usual. Patient is very poor historian due to underlying dementia. Most of the information has been obtained from EMR documentation. in d/w ED provider and daughter on phone.  Per daughter, patient was more lethargic with chills and rigor and his body was warn than his baseline. Reportedly, patient goes daily to day program at [facility].     Patient is extremely deconditioned. limited historian.     Physical Exam:   GENERAL APPEARANCE: chronically ill, verdy deconditioned, frail.   T(C): 36.7 (11-06-24 @ 07:29), Max: 36.7 (11-06-24 @ 07:29)  HR: 62 (11-06-24 @ 07:29) (53 - 62)  BP: 128/87 (11-06-24 @ 07:29) (128/87 - 131/52)  RR: 17 (11-06-24 @ 07:29) (17 - 18)  SpO2: 94% (11-06-24 @ 07:29) (94% - 96%)  Wt(kg): --  HEENT:  temporal muscle wasting  Skin:  thin /  dry /  pale  NECK:  Supple without lymphadenopathy.   HEART:  Regular rate and rhythm. normal S1 and S2, No M/R/G  LUNGS:  Good ins/exp effort, no W/R/R/C  ABDOMEN:  Soft   EXTREMITIES:  Without cyanosis, clubbing or edema.   NEUROLOGICAL:  Gross nonfocal

## 2024-11-06 NOTE — DISCHARGE NOTE PROVIDER - NSDCFUSCHEDAPPT_GEN_ALL_CORE_FT
Susanne Phillips Physician [facility]  GERIATRICS [address] [place] [address]   Scheduled Appointment: 11/07/2024

## 2024-11-06 NOTE — DISCHARGE NOTE PROVIDER - NSDCMRMEDTOKEN_GEN_ALL_CORE_FT
gabapentin 250 mg/5 mL oral solution: 12 milliliter(s) orally once a day (at bedtime)  losartan 25 mg oral tablet: 1 tab(s) orally once a day  mirtazapine 15 mg oral tablet: 1 tab(s) orally once a day (at bedtime)  pancrelipase 12,000 units-38,000 units-60,000 units oral delayed release capsule: 4 cap(s) orally 3 times a day (with meals)  Seroquel 25 mg oral tablet: 1 tab(s) orally once a day (at bedtime) as needed for  agitation  sertraline 25 mg oral tablet: 1 tab(s) orally once a day   cefuroxime 500 mg oral tablet: 1 tab(s) orally 2 times a day  gabapentin 250 mg/5 mL oral solution: 12 milliliter(s) orally once a day (at bedtime)  losartan 25 mg oral tablet: 1 tab(s) orally once a day  mirtazapine 15 mg oral tablet: 1 tab(s) orally once a day (at bedtime)  pancrelipase 12,000 units-38,000 units-60,000 units oral delayed release capsule: 4 cap(s) orally 3 times a day (with meals)  sertraline 25 mg oral tablet: 1 tab(s) orally once a day  tamsulosin 0.4 mg oral capsule: 1 cap(s) orally once a day (at bedtime)  tamsulosin 0.4 mg oral capsule: 1 cap(s) orally once a day (at bedtime)

## 2024-11-07 ENCOUNTER — APPOINTMENT (OUTPATIENT)
Dept: GERIATRICS | Facility: CLINIC | Age: 89
End: 2024-11-07
Payer: MEDICARE

## 2024-11-07 PROCEDURE — 99496 TRANSJ CARE MGMT HIGH F2F 7D: CPT

## 2024-11-07 NOTE — ASU PATIENT PROFILE, ADULT - CAREGIVER
Yes Detail Level: Detailed Depth Of Biopsy: dermis Was A Bandage Applied: Yes Size Of Lesion In Cm: 0 Biopsy Type: H and E Biopsy Method: Dermablade Anesthesia Type: 1% lidocaine with epinephrine Anesthesia Volume In Cc: 0.5 Hemostasis: Drysol Wound Care: Petrolatum Dressing: bandage Destruction After The Procedure: No Type Of Destruction Used: Curettage Curettage Text: The wound bed was treated with curettage after the biopsy was performed. Cryotherapy Text: The wound bed was treated with cryotherapy after the biopsy was performed. Electrodesiccation Text: The wound bed was treated with electrodesiccation after the biopsy was performed. Electrodesiccation And Curettage Text: The wound bed was treated with electrodesiccation and curettage after the biopsy was performed. Silver Nitrate Text: The wound bed was treated with silver nitrate after the biopsy was performed. Lab: [id number] Consent: Written consent was obtained and risks were reviewed including but not limited to scarring, infection, bleeding, scabbing, incomplete removal, nerve damage and allergy to anesthesia. Post-Care Instructions: I reviewed with the patient in detail post-care instructions. Patient is to keep the biopsy site dry overnight, and then apply bacitracin twice daily until healed. Patient may apply hydrogen peroxide soaks to remove any crusting. Notification Instructions: Patient will be notified of biopsy results. However, patient instructed to call the office if not contacted within 2 weeks. Billing Type: Third-Party Bill Information: Selecting Yes will display possible errors in your note based on the variables you have selected. This validation is only offered as a suggestion for you. PLEASE NOTE THAT THE VALIDATION TEXT WILL BE REMOVED WHEN YOU FINALIZE YOUR NOTE. IF YOU WANT TO FAX A PRELIMINARY NOTE YOU WILL NEED TO TOGGLE THIS TO 'NO' IF YOU DO NOT WANT IT IN YOUR FAXED NOTE.

## 2024-11-11 DIAGNOSIS — F02.84 DEMENTIA IN OTHER DISEASES CLASSIFIED ELSEWHERE, UNSPECIFIED SEVERITY, WITH ANXIETY: ICD-10-CM

## 2024-11-11 DIAGNOSIS — R32 UNSPECIFIED URINARY INCONTINENCE: ICD-10-CM
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
## 2024-11-11 DIAGNOSIS — G30.9 ALZHEIMER'S DISEASE, UNSPECIFIED: ICD-10-CM

## 2024-11-11 DIAGNOSIS — Z96.651 PRESENCE OF RIGHT ARTIFICIAL KNEE JOINT: ICD-10-CM

## 2024-11-11 DIAGNOSIS — N40.1 BENIGN PROSTATIC HYPERPLASIA WITH LOWER URINARY TRACT SYMPTOMS: ICD-10-CM

## 2024-11-11 DIAGNOSIS — R65.20 SEVERE SEPSIS WITHOUT SEPTIC SHOCK: ICD-10-CM

## 2024-11-11 DIAGNOSIS — J18.9 PNEUMONIA, UNSPECIFIED ORGANISM: ICD-10-CM

## 2024-11-11 DIAGNOSIS — N17.9 ACUTE KIDNEY FAILURE, UNSPECIFIED: ICD-10-CM

## 2024-11-11 DIAGNOSIS — E44.0 MODERATE PROTEIN-CALORIE MALNUTRITION: ICD-10-CM

## 2024-11-11 DIAGNOSIS — A41.9 SEPSIS, UNSPECIFIED ORGANISM: ICD-10-CM

## 2024-11-11 DIAGNOSIS — I12.9 HYPERTENSIVE CHRONIC KIDNEY DISEASE WITH STAGE 1 THROUGH STAGE 4 CHRONIC KIDNEY DISEASE, OR UNSPECIFIED CHRONIC KIDNEY DISEASE: ICD-10-CM

## 2024-11-11 DIAGNOSIS — H54.415A BLINDNESS RIGHT EYE CATEGORY 5, NORMAL VISION LEFT EYE: ICD-10-CM

## 2024-11-13 ENCOUNTER — APPOINTMENT (OUTPATIENT)
Dept: GERIATRICS | Facility: CLINIC | Age: 89
End: 2024-11-13
Payer: MEDICARE

## 2024-11-13 DIAGNOSIS — R53.81 OTHER MALAISE: ICD-10-CM

## 2024-11-13 DIAGNOSIS — R13.10 DYSPHAGIA, UNSPECIFIED: ICD-10-CM

## 2024-11-13 DIAGNOSIS — N39.0 URINARY TRACT INFECTION, SITE NOT SPECIFIED: ICD-10-CM

## 2024-11-13 DIAGNOSIS — G47.00 INSOMNIA, UNSPECIFIED: ICD-10-CM

## 2024-11-13 DIAGNOSIS — N40.0 BENIGN PROSTATIC HYPERPLASIA WITHOUT LOWER URINARY TRACT SYMPMS: ICD-10-CM

## 2024-11-13 DIAGNOSIS — R26.81 UNSTEADINESS ON FEET: ICD-10-CM

## 2024-11-13 DIAGNOSIS — Z71.89 OTHER SPECIFIED COUNSELING: ICD-10-CM

## 2024-11-13 DIAGNOSIS — F03.C0 UNSPECIFIED DEMENTIA, SEVERE, WITHOUT BEHAVIORAL DISTURBANCE, PSYCHOTIC DISTURBANCE, MOOD DISTURBANCE, AND ANXIETY: ICD-10-CM

## 2024-11-13 PROCEDURE — 99214 OFFICE O/P EST MOD 30 MIN: CPT

## 2024-11-13 PROCEDURE — G2211 COMPLEX E/M VISIT ADD ON: CPT

## 2024-11-13 RX ORDER — TAMSULOSIN HYDROCHLORIDE 0.4 MG/1
0.4 CAPSULE ORAL
Qty: 90 | Refills: 3 | Status: ACTIVE | COMMUNITY
Start: 2024-11-07 | End: 1900-01-01

## 2024-11-15 ENCOUNTER — LABORATORY RESULT (OUTPATIENT)
Age: 89
End: 2024-11-15

## 2024-11-19 ENCOUNTER — LABORATORY RESULT (OUTPATIENT)
Age: 89
End: 2024-11-19

## 2024-11-29 ENCOUNTER — APPOINTMENT (OUTPATIENT)
Dept: GERIATRICS | Facility: CLINIC | Age: 89
End: 2024-11-29
Payer: MEDICARE

## 2024-11-29 DIAGNOSIS — N40.0 BENIGN PROSTATIC HYPERPLASIA WITHOUT LOWER URINARY TRACT SYMPMS: ICD-10-CM

## 2024-11-29 DIAGNOSIS — R53.81 OTHER MALAISE: ICD-10-CM

## 2024-11-29 DIAGNOSIS — F03.C0 UNSPECIFIED DEMENTIA, SEVERE, WITHOUT BEHAVIORAL DISTURBANCE, PSYCHOTIC DISTURBANCE, MOOD DISTURBANCE, AND ANXIETY: ICD-10-CM

## 2024-11-29 DIAGNOSIS — G47.00 INSOMNIA, UNSPECIFIED: ICD-10-CM

## 2024-11-29 DIAGNOSIS — R26.81 UNSTEADINESS ON FEET: ICD-10-CM

## 2024-11-29 PROCEDURE — G2211 COMPLEX E/M VISIT ADD ON: CPT

## 2024-11-29 PROCEDURE — 99214 OFFICE O/P EST MOD 30 MIN: CPT

## 2024-12-24 NOTE — PHYSICAL THERAPY INITIAL EVALUATION ADULT - PHYSICAL ASSIST/NONPHYSICAL ASSIST: STAND/SIT, REHAB EVAL
11/6/24 LOV   \"Sumatriptan 100mg tabs prn migraine.  She also has sumatriptan 6mg injections and will only use one or the other for a particular migraine attack\"    RN ordered   SUMAtriptan (Imitrex STATdose System) 6 MG/0.5ML auto-injector 3 mL 3 12/24/2024 --    Sig - Route: Inject 0.05 mLs into the skin as needed for Migraine. - Subcutaneous      To pharmacy as requested. No PA received yet.    Annamarie JULIEN   tactile cues/verbal cues/1 person assist

## 2025-02-21 NOTE — ED ADULT TRIAGE NOTE - HEART RATE (BEATS/MIN)
"Daily Note     Today's date: 2025  Patient name: Ora Veliz  : 1969  MRN: [id number]  Referring provider: Sherwin Redman DO  Dx:   Encounter Diagnosis     ICD-10-CM    1. Acute bilateral low back pain with right-sided sciatica  M54.41       2. Lumbar radiculopathy  M54.16           Start Time: 0859  Stop Time: 0945  Total time in clinic (min): 46 minutes    Subjective: Patient stated that she is \"Mehh\" today. She stated that she has been doing some more exercises around the house.      Objective: See treatment diary below        Assessment: Patient tolerated treatment well. Patient continues to progress as tolerated.  Patient given TA activation with ball pressdowns today, tolerated well. Patient demonstrated fatigue post treatment, demonstrated good technique on exercises and would continue to benefit from skilled PT.       Plan: Continue per plan of care.      Daily Treatment Diary     Precautions: universal.   HEP ACCESS CODE:   FOTO Completed On: 24    POC Expires Unit Limit Auth Expiration Date PT/OT/STVisit Limit   3/12/25 bomnb N/a 90 PCY                     Auth Status DATE    90v PCY Visit # 7 8   5 6    Remaining 83 82   85 84   MANUAL THERAPY         SIJ assess/treat                                                      THERAPEUTIC EXERCISE HEP         Sustained prone trial 10' with ppu 10' wth ppu   5' with ppu on elbows 10' with ppu   L hip into wall standing          Standing ext (hips into wall) Added          R hip into wall standing          Adduction          Seated Hamstring Stretch  30\" 3x ea 30\" 3x ea   30\" 3x ea 30\" 3x ea   Quadriceps Stretch  30\" 3x ea 30\" 3x ea   30\" 3x ea 30\" 5x ea             NEUROMUSCULAR REEDUCATION           Seated sciatic nerve sliders Added      2x10 B 2x10 B   HL PB           Standing PPT c glute iso          Adduction      20x 5\" 20x 5\"   Hip Abduction  2x10 ea standing 2x10 ea standing   2x10 ea standing 2x10 " "ea standing   Hip Extension  2x10 ea standing 2x10 ea standing   2x10 ea standing 2x10 ea standing   TA Ball Press Downs   20x 5\"                           THERAPEUTIC ACTIVITY          Reassessment of Function       10'                                 GAIT TRAINING                                                  MODALITIES                                  " 88

## 2025-02-22 ENCOUNTER — INPATIENT (INPATIENT)
Facility: HOSPITAL | Age: 89
LOS: 1 days | Discharge: HOME HEALTH | DRG: 392 | End: 2025-02-24
Attending: INTERNAL MEDICINE | Admitting: INTERNAL MEDICINE
Payer: MEDICARE

## 2025-02-22 VITALS
WEIGHT: 164.91 LBS | OXYGEN SATURATION: 96 % | RESPIRATION RATE: 16 BRPM | TEMPERATURE: 98 F | SYSTOLIC BLOOD PRESSURE: 147 MMHG | HEART RATE: 80 BPM | DIASTOLIC BLOOD PRESSURE: 69 MMHG

## 2025-02-22 DIAGNOSIS — K52.9 NONINFECTIVE GASTROENTERITIS AND COLITIS, UNSPECIFIED: ICD-10-CM

## 2025-02-22 DIAGNOSIS — Z90.89 ACQUIRED ABSENCE OF OTHER ORGANS: Chronic | ICD-10-CM

## 2025-02-22 DIAGNOSIS — Z96.651 PRESENCE OF RIGHT ARTIFICIAL KNEE JOINT: Chronic | ICD-10-CM

## 2025-02-22 LAB
ALBUMIN SERPL ELPH-MCNC: 3.6 G/DL — SIGNIFICANT CHANGE UP (ref 3.3–5)
ALP SERPL-CCNC: 68 U/L — SIGNIFICANT CHANGE UP (ref 40–120)
ALT FLD-CCNC: 16 U/L — SIGNIFICANT CHANGE UP (ref 12–78)
ANION GAP SERPL CALC-SCNC: 1 MMOL/L — LOW (ref 5–17)
APPEARANCE UR: CLEAR — SIGNIFICANT CHANGE UP
AST SERPL-CCNC: 15 U/L — SIGNIFICANT CHANGE UP (ref 15–37)
BASOPHILS # BLD AUTO: 0.02 K/UL — SIGNIFICANT CHANGE UP (ref 0–0.2)
BASOPHILS NFR BLD AUTO: 0.2 % — SIGNIFICANT CHANGE UP (ref 0–2)
BILIRUB SERPL-MCNC: 0.5 MG/DL — SIGNIFICANT CHANGE UP (ref 0.2–1.2)
BILIRUB UR-MCNC: NEGATIVE — SIGNIFICANT CHANGE UP
BUN SERPL-MCNC: 26 MG/DL — HIGH (ref 7–23)
CALCIUM SERPL-MCNC: 9.2 MG/DL — SIGNIFICANT CHANGE UP (ref 8.5–10.1)
CHLORIDE SERPL-SCNC: 109 MMOL/L — HIGH (ref 96–108)
CO2 SERPL-SCNC: 28 MMOL/L — SIGNIFICANT CHANGE UP (ref 22–31)
COLOR SPEC: YELLOW — SIGNIFICANT CHANGE UP
CREAT SERPL-MCNC: 1.22 MG/DL — SIGNIFICANT CHANGE UP (ref 0.5–1.3)
DIFF PNL FLD: NEGATIVE — SIGNIFICANT CHANGE UP
EGFR: 55 ML/MIN/1.73M2 — LOW
EOSINOPHIL # BLD AUTO: 0.31 K/UL — SIGNIFICANT CHANGE UP (ref 0–0.5)
EOSINOPHIL NFR BLD AUTO: 3.8 % — SIGNIFICANT CHANGE UP (ref 0–6)
FLUAV AG NPH QL: SIGNIFICANT CHANGE UP
FLUBV AG NPH QL: SIGNIFICANT CHANGE UP
GLUCOSE SERPL-MCNC: 152 MG/DL — HIGH (ref 70–99)
GLUCOSE UR QL: NEGATIVE MG/DL — SIGNIFICANT CHANGE UP
HCT VFR BLD CALC: 38.5 % — LOW (ref 39–50)
HGB BLD-MCNC: 12.6 G/DL — LOW (ref 13–17)
IMM GRANULOCYTES # BLD AUTO: 0.02 K/UL — SIGNIFICANT CHANGE UP (ref 0–0.07)
IMM GRANULOCYTES NFR BLD AUTO: 0.2 % — SIGNIFICANT CHANGE UP (ref 0–0.9)
KETONES UR-MCNC: NEGATIVE MG/DL — SIGNIFICANT CHANGE UP
LEUKOCYTE ESTERASE UR-ACNC: NEGATIVE — SIGNIFICANT CHANGE UP
LIDOCAIN IGE QN: 11 U/L — LOW (ref 13–75)
LYMPHOCYTES # BLD AUTO: 1.28 K/UL — SIGNIFICANT CHANGE UP (ref 1–3.3)
LYMPHOCYTES NFR BLD AUTO: 15.5 % — SIGNIFICANT CHANGE UP (ref 13–44)
MAGNESIUM SERPL-MCNC: 2.3 MG/DL — SIGNIFICANT CHANGE UP (ref 1.6–2.6)
MCHC RBC-ENTMCNC: 32.7 G/DL — SIGNIFICANT CHANGE UP (ref 32–36)
MCHC RBC-ENTMCNC: 32.7 PG — SIGNIFICANT CHANGE UP (ref 27–34)
MCV RBC AUTO: 100 FL — SIGNIFICANT CHANGE UP (ref 80–100)
MONOCYTES # BLD AUTO: 0.89 K/UL — SIGNIFICANT CHANGE UP (ref 0–0.9)
MONOCYTES NFR BLD AUTO: 10.8 % — SIGNIFICANT CHANGE UP (ref 2–14)
NEUTROPHILS # BLD AUTO: 5.72 K/UL — SIGNIFICANT CHANGE UP (ref 1.8–7.4)
NEUTROPHILS NFR BLD AUTO: 69.5 % — SIGNIFICANT CHANGE UP (ref 43–77)
NITRITE UR-MCNC: NEGATIVE — SIGNIFICANT CHANGE UP
NRBC # BLD AUTO: 0 K/UL — SIGNIFICANT CHANGE UP (ref 0–0)
NRBC # FLD: 0 K/UL — SIGNIFICANT CHANGE UP (ref 0–0)
NRBC BLD AUTO-RTO: 0 /100 WBCS — SIGNIFICANT CHANGE UP (ref 0–0)
PH UR: 5.5 — SIGNIFICANT CHANGE UP (ref 5–8)
PLATELET # BLD AUTO: 133 K/UL — LOW (ref 150–400)
PMV BLD: 12 FL — SIGNIFICANT CHANGE UP (ref 7–13)
POTASSIUM SERPL-MCNC: 4.1 MMOL/L — SIGNIFICANT CHANGE UP (ref 3.5–5.3)
POTASSIUM SERPL-SCNC: 4.1 MMOL/L — SIGNIFICANT CHANGE UP (ref 3.5–5.3)
PROT SERPL-MCNC: 7.2 GM/DL — SIGNIFICANT CHANGE UP (ref 6–8.3)
PROT UR-MCNC: NEGATIVE MG/DL — SIGNIFICANT CHANGE UP
RBC # BLD: 3.85 M/UL — LOW (ref 4.2–5.8)
RBC # FLD: 12.5 % — SIGNIFICANT CHANGE UP (ref 10.3–14.5)
RSV RNA NPH QL NAA+NON-PROBE: SIGNIFICANT CHANGE UP
SARS-COV-2 RNA SPEC QL NAA+PROBE: SIGNIFICANT CHANGE UP
SODIUM SERPL-SCNC: 138 MMOL/L — SIGNIFICANT CHANGE UP (ref 135–145)
SP GR SPEC: 1.02 — SIGNIFICANT CHANGE UP (ref 1–1.03)
UROBILINOGEN FLD QL: 0.2 MG/DL — SIGNIFICANT CHANGE UP (ref 0.2–1)
WBC # BLD: 8.24 K/UL — SIGNIFICANT CHANGE UP (ref 3.8–10.5)
WBC # FLD AUTO: 8.24 K/UL — SIGNIFICANT CHANGE UP (ref 3.8–10.5)

## 2025-02-22 PROCEDURE — 74177 CT ABD & PELVIS W/CONTRAST: CPT | Mod: 26

## 2025-02-22 PROCEDURE — 85027 COMPLETE CBC AUTOMATED: CPT

## 2025-02-22 PROCEDURE — 80048 BASIC METABOLIC PNL TOTAL CA: CPT

## 2025-02-22 PROCEDURE — 99285 EMERGENCY DEPT VISIT HI MDM: CPT

## 2025-02-22 PROCEDURE — 97163 PT EVAL HIGH COMPLEX 45 MIN: CPT | Mod: GP

## 2025-02-22 PROCEDURE — 36415 COLL VENOUS BLD VENIPUNCTURE: CPT

## 2025-02-22 PROCEDURE — 97116 GAIT TRAINING THERAPY: CPT | Mod: GP

## 2025-02-22 PROCEDURE — 99222 1ST HOSP IP/OBS MODERATE 55: CPT | Mod: AI

## 2025-02-22 RX ORDER — ONDANSETRON HCL/PF 4 MG/2 ML
4 VIAL (ML) INJECTION EVERY 8 HOURS
Refills: 0 | Status: DISCONTINUED | OUTPATIENT
Start: 2025-02-22 | End: 2025-02-24

## 2025-02-22 RX ORDER — MIDAZOLAM IN 0.9 % SOD.CHLORID 1 MG/ML
1 PLASTIC BAG, INJECTION (ML) INTRAVENOUS ONCE
Refills: 0 | Status: DISCONTINUED | OUTPATIENT
Start: 2025-02-22 | End: 2025-02-22

## 2025-02-22 RX ORDER — SERTRALINE 100 MG/1
25 TABLET, FILM COATED ORAL DAILY
Refills: 0 | Status: DISCONTINUED | OUTPATIENT
Start: 2025-02-22 | End: 2025-02-24

## 2025-02-22 RX ORDER — MAGNESIUM, ALUMINUM HYDROXIDE 200-200 MG
30 TABLET,CHEWABLE ORAL EVERY 4 HOURS
Refills: 0 | Status: DISCONTINUED | OUTPATIENT
Start: 2025-02-22 | End: 2025-02-24

## 2025-02-22 RX ORDER — QUETIAPINE FUMARATE 25 MG/1
1 TABLET ORAL
Refills: 0 | DISCHARGE

## 2025-02-22 RX ORDER — LOSARTAN POTASSIUM 100 MG/1
50 TABLET, FILM COATED ORAL DAILY
Refills: 0 | Status: DISCONTINUED | OUTPATIENT
Start: 2025-02-22 | End: 2025-02-22

## 2025-02-22 RX ORDER — ERYTHROMYCIN 5 MG/G
1 OINTMENT OPHTHALMIC
Refills: 0 | DISCHARGE

## 2025-02-22 RX ORDER — QUETIAPINE FUMARATE 25 MG/1
25 TABLET ORAL AT BEDTIME
Refills: 0 | Status: DISCONTINUED | OUTPATIENT
Start: 2025-02-22 | End: 2025-02-24

## 2025-02-22 RX ORDER — TAMSULOSIN HYDROCHLORIDE 0.4 MG/1
0.4 CAPSULE ORAL AT BEDTIME
Refills: 0 | Status: DISCONTINUED | OUTPATIENT
Start: 2025-02-22 | End: 2025-02-24

## 2025-02-22 RX ORDER — MIRTAZAPINE 30 MG/1
15 TABLET, FILM COATED ORAL AT BEDTIME
Refills: 0 | Status: DISCONTINUED | OUTPATIENT
Start: 2025-02-22 | End: 2025-02-24

## 2025-02-22 RX ORDER — ACETAMINOPHEN 500 MG/5ML
650 LIQUID (ML) ORAL EVERY 6 HOURS
Refills: 0 | Status: DISCONTINUED | OUTPATIENT
Start: 2025-02-22 | End: 2025-02-24

## 2025-02-22 RX ORDER — GABAPENTIN 400 MG/1
300 CAPSULE ORAL AT BEDTIME
Refills: 0 | Status: DISCONTINUED | OUTPATIENT
Start: 2025-02-22 | End: 2025-02-24

## 2025-02-22 RX ORDER — LOSARTAN POTASSIUM 100 MG/1
50 TABLET, FILM COATED ORAL DAILY
Refills: 0 | Status: DISCONTINUED | OUTPATIENT
Start: 2025-02-23 | End: 2025-02-24

## 2025-02-22 RX ORDER — MELATONIN 5 MG
3 TABLET ORAL AT BEDTIME
Refills: 0 | Status: DISCONTINUED | OUTPATIENT
Start: 2025-02-22 | End: 2025-02-24

## 2025-02-22 RX ORDER — LIPASE/PROTEASE/AMYLASE 10K-37.5K
4 CAPSULE,DELAYED RELEASE (ENTERIC COATED) ORAL
Refills: 0 | Status: DISCONTINUED | OUTPATIENT
Start: 2025-02-22 | End: 2025-02-24

## 2025-02-22 RX ORDER — LIPASE/PROTEASE/AMYLASE 10K-37.5K
4 CAPSULE,DELAYED RELEASE (ENTERIC COATED) ORAL
Refills: 0 | DISCHARGE

## 2025-02-22 RX ADMIN — QUETIAPINE FUMARATE 25 MILLIGRAM(S): 25 TABLET ORAL at 22:47

## 2025-02-22 RX ADMIN — MIRTAZAPINE 15 MILLIGRAM(S): 30 TABLET, FILM COATED ORAL at 22:46

## 2025-02-22 RX ADMIN — Medication 1000 MILLILITER(S): at 05:47

## 2025-02-22 RX ADMIN — GABAPENTIN 300 MILLIGRAM(S): 400 CAPSULE ORAL at 22:46

## 2025-02-22 RX ADMIN — Medication 125 MILLILITER(S): at 18:54

## 2025-02-22 RX ADMIN — TAMSULOSIN HYDROCHLORIDE 0.4 MILLIGRAM(S): 0.4 CAPSULE ORAL at 22:46

## 2025-02-22 RX ADMIN — Medication 1 MILLIGRAM(S): at 06:54

## 2025-02-22 RX ADMIN — Medication 4 CAPSULE(S): at 16:55

## 2025-02-22 NOTE — ED ADULT NURSE REASSESSMENT NOTE - NS ED NURSE REASSESS COMMENT FT1
pt positioned for comfort. offered and accepted a sandwich and water. denies pain or discomfort at this time. tolerating PO well
received pt from JONI Tello. pt noted to be resting comfortably at this time awaiting ct scan. denies pain or discomfort. safety and comfort maintained
RN provided perineal care and changed pt linens. pt tolerated well

## 2025-02-22 NOTE — ED PROVIDER NOTE - PROGRESS NOTE DETAILS
Dr. Buitrago ED attending- received sign out from Dr. Bowman. CT AP shows no acute actionable findings. Pt improved, tolerating PO. Spoke with family daughter and son whom he lives with- requesting admission as pt has been very weak and unable to get out of bed with persistent diarrhea. admitted to Dr. Finnegan hospitalist attending

## 2025-02-22 NOTE — ED PROVIDER NOTE - OBJECTIVE STATEMENT
Pt. is a 89 yo M with hx of HTN, hyperlipidemia, osteoarthritis, depression, BPH, dementia presenting via ambulance due to diarrhea. Patient is a poor historian.  States he doesn't feel well and has a sore throat.  Denies congestion or coughing.  Denies abdominal pain.

## 2025-02-22 NOTE — ED ADULT TRIAGE NOTE - CHIEF COMPLAINT QUOTE
Pt BIBEMS from home c/o diarrhea x 1 week. Pt poor historian, A&Ox1. Pt denies abd pain, n/v, fevers.

## 2025-02-22 NOTE — ED ADULT NURSE NOTE - NSFALLASSISTNEEDED_ED_ALL_ED
Price (Do Not Change): 0.00
Instructions: This plan will send the code FBSD to the PM system.  DO NOT or CHANGE the price.
Standing/Walking/Toileting/Moving from bed to stretcher
Detail Level: Simple

## 2025-02-22 NOTE — PATIENT PROFILE ADULT - FALL HARM RISK - HARM RISK INTERVENTIONS
Assistance with ambulation/Communicate Risk of Fall with Harm to all staff/Monitor for mental status changes/Monitor gait and stability/Provide patient with walking aids - walker, cane, crutches/Reorient to person, place and time as needed/Toileting schedule using arm’s reach rule for commode and bathroom/Use of alarms - bed, chair and/or voice tab/Visual Cue: Yellow wristband and red socks/Bed in lowest position, wheels locked, appropriate side rails in place/Call bell, personal items and telephone in reach/Instruct patient to call for assistance before getting out of bed or chair/Non-slip footwear when patient is out of bed/[place] to call system/Purposeful Proactive Rounding/Room/bathroom lighting operational, light cord in reach

## 2025-02-22 NOTE — PATIENT PROFILE ADULT - STAGE
25 Y/O FEMALE PRESENTS TO ED, C/O SORE THROAT X1 DAY AND HA X5 DAYS. PT IS 
ALERT TO PERSON PLACE AND TIME. PT STATES PAIN IS 10/10. C/O DIFFICULTY TALKING 
DUE TO PAIN. NO SOB NOTED. PT TOOK ALEVE YESTERDAY BUT WAS INEFFECTIVE. PT 
DENIES ANY DIZZINESS. HAD ONE EPISODE OF VOMITTING TODAY PTA. PT PRESENTS 
DIAPHORETIC. PT VSS. ERMD AWARE. WILL CONTINUE TO MONITOR. stage I

## 2025-02-22 NOTE — ED PROVIDER NOTE - GASTROINTESTINAL, MLM
Abdomen soft, diminished bowel sounds, mildly distended; no focal tenderness; no rebound or guarding.

## 2025-02-22 NOTE — ED PROVIDER NOTE - CLINICAL SUMMARY MEDICAL DECISION MAKING FREE TEXT BOX
89 yo M sent to the hospital from home for diarrhea for unknown amount of time. Labs, EKG, UA, and CT abd/pelv sent.  Flu/RSv/Covid swab sent.

## 2025-02-22 NOTE — H&P ADULT - HISTORY OF PRESENT ILLNESS
· 89 yo male with hx of HTN, Hyperlipidemia, Osteoarthritis, Depression, BPH, Dementia presenting via ambulance due to diarrhea. Patient is a poor historian.  States he doesn't feel well and has a sore throat.  Denies congestion or coughing.  Denies abdominal pain.  In the ED he had loose stool and he is very weak and unable to go home alone.  Patient is confused and ROS is unreliable     PAST MEDICAL HISTORY:  Anxiety   BPH (benign prostatic hyperplasia)   Dementia   Depression   Hyperlipidemia   Hypertension   OA (osteoarthritis).     PAST SURGICAL HISTORY:  H/O total knee replacement, right   History of tonsillectomy.     FAMILY HISTORY:  cannot recall    Social History:  lives in the community , no smoking, no Etoh        REVIEW OF SYSTEMS:  unable to obtain due to confusion   GASTROINTESTINAL: No abd pain, No nausea, No vomiting, No hematemesis, + diarrhea . No melena, No hematochezia.      All other review of systems is negative unless indicated above    Vital Signs Last 24 Hrs  T(C): 36.9 (22 Feb 2025 12:05), Max: 37.1 (22 Feb 2025 10:41)  T(F): 98.4 (22 Feb 2025 12:05), Max: 98.8 (22 Feb 2025 10:41)  HR: 100 (22 Feb 2025 12:05) (80 - 100)  BP: 126/65 (22 Feb 2025 12:05) (126/65 - 147/69)  BP(mean): 90 (22 Feb 2025 05:56) (90 - 90)  RR: 15 (22 Feb 2025 12:05) (15 - 18)  SpO2: 99% (22 Feb 2025 12:05) (96% - 100%)    Parameters below as of 22 Feb 2025 12:05  Patient On (Oxygen Delivery Method): room air        PHYSICAL EXAM:    GENERAL: NAD  HEENT:  NC/AT, EOMI, PERRLA, No scleral icterus, Moist mucous membranes  NECK: Supple, No JVD  CNS:  Alert & Oriented X1 (name only), Motor Strength 5/5 B/L upper and lower extremities; DTRs 2+ intact   LUNG: Normal Breath sounds, Clear to auscultation bilaterally, No rales, No rhonchi, No wheezing  HEART: RRR; No murmurs, No rubs  ABDOMEN: +BS, ST/ND/NT  GENITOURINARY: Voiding, Bladder not distended  EXTREMITIES:  2+ Peripheral Pulses, No clubbing, No cyanosis, No tibial edema  MUSCULOSKELTAL: Joints normal ROM, No TTP, No effusion  SKIN: no rashes  RECTAL: deferred, not indicated  BREAST: deferred                          12.6   8.24  )-----------( 133      ( 22 Feb 2025 04:39 )             38.5     02-22    138  |  109[H]  |  26[H]  ----------------------------<  152[H]  4.1   |  28  |  1.22    Ca    9.2      22 Feb 2025 04:39  Mg     2.3     02-22    TPro  7.2  /  Alb  3.6  /  TBili  0.5  /  DBili  x   /  AST  15  /  ALT  16  /  AlkPhos  68  02-22    Vancomycin levels:   Cultures:     MEDICATIONS  (STANDING):  gabapentin Solution 300 milliGRAM(s) Oral at bedtime  losartan 50 milliGRAM(s) Oral daily  mirtazapine 15 milliGRAM(s) Oral at bedtime  pancrelipase  (CREON 12,000 Lipase Units) 4 Capsule(s) Oral three times a day with meals  QUEtiapine 25 milliGRAM(s) Oral at bedtime  sertraline 25 milliGRAM(s) Oral daily  sodium chloride 0.9%. 1000 milliLiter(s) (125 mL/Hr) IV Continuous <Continuous>  tamsulosin 0.4 milliGRAM(s) Oral at bedtime    MEDICATIONS  (PRN):  acetaminophen     Tablet .. 650 milliGRAM(s) Oral every 6 hours PRN Temp greater or equal to 38C (100.4F), Mild Pain (1 - 3)  aluminum hydroxide/magnesium hydroxide/simethicone Suspension 30 milliLiter(s) Oral every 4 hours PRN Dyspepsia  melatonin 3 milliGRAM(s) Oral at bedtime PRN Insomnia  ondansetron Injectable 4 milliGRAM(s) IV Push every 8 hours PRN Nausea and/or Vomiting      all labs reviewed  all imaging reviewed    a/p:      1. Viral gastroenteritis:  supportive care  IV hydration   if diarrhea persists will send GI PCR  Regular diet     2. Htn:   hold Losartan until BP recovers after IVF    3. Anxiety/Depression:  c/w Seroquel , Zoloft , Neurontin qHS

## 2025-02-22 NOTE — ED ADULT NURSE NOTE - NSFALLHARMRISKINTERV_ED_ALL_ED
Assistance OOB with selected safe patient handling equipment if applicable/Communicate risk of Fall with Harm to all staff, patient, and family/Monitor gait and stability/Monitor for mental status changes and reorient to person, place, and time, as needed/Move patient closer to nursing station/within visual sight of ED staff/Provide patient with walking aids/Provide visual cue: red socks, yellow wristband, yellow gown, etc/Reinforce activity limits and safety measures with patient and family/Toileting schedule using arm’s reach rule for commode and bathroom/Use of alarms - bed, stretcher, chair and/or video monitoring/Bed in lowest position, wheels locked, appropriate side rails in place/Call bell, personal items and telephone in reach/Instruct patient to call for assistance before getting out of bed/chair/stretcher/Non-slip footwear applied when patient is off stretcher/Crum to call system/Physically safe environment - no spills, clutter or unnecessary equipment/Purposeful Proactive Rounding/Room/bathroom lighting operational, light cord in reach

## 2025-02-22 NOTE — ED ADULT NURSE NOTE - OBJECTIVE STATEMENT
Pt 89 y/o M A&Ox1, alert to self, from home, breathing unlabored. Sent via EMS by family for diarrhea, No family at bedside, pt unable to answer questions due to PMH dimentia. Pt is a poor historian. Pt noted to be having diarrhea. Pt has an active MOLST with DNR/DNI. Safety & comfort measures maintained.

## 2025-02-23 PROCEDURE — 99233 SBSQ HOSP IP/OBS HIGH 50: CPT

## 2025-02-23 RX ORDER — NYSTATIN 100000 [USP'U]/G
1 CREAM TOPICAL EVERY 12 HOURS
Refills: 0 | Status: DISCONTINUED | OUTPATIENT
Start: 2025-02-23 | End: 2025-02-24

## 2025-02-23 RX ORDER — ENOXAPARIN SODIUM 100 MG/ML
40 INJECTION SUBCUTANEOUS EVERY 24 HOURS
Refills: 0 | Status: DISCONTINUED | OUTPATIENT
Start: 2025-02-23 | End: 2025-02-24

## 2025-02-23 RX ADMIN — LOSARTAN POTASSIUM 50 MILLIGRAM(S): 100 TABLET, FILM COATED ORAL at 09:02

## 2025-02-23 RX ADMIN — TAMSULOSIN HYDROCHLORIDE 0.4 MILLIGRAM(S): 0.4 CAPSULE ORAL at 22:09

## 2025-02-23 RX ADMIN — Medication 4 CAPSULE(S): at 09:00

## 2025-02-23 RX ADMIN — SERTRALINE 25 MILLIGRAM(S): 100 TABLET, FILM COATED ORAL at 09:01

## 2025-02-23 RX ADMIN — Medication 4 CAPSULE(S): at 12:35

## 2025-02-23 RX ADMIN — NYSTATIN 1 APPLICATION(S): 100000 CREAM TOPICAL at 22:09

## 2025-02-23 RX ADMIN — Medication 4 CAPSULE(S): at 18:59

## 2025-02-23 RX ADMIN — QUETIAPINE FUMARATE 25 MILLIGRAM(S): 25 TABLET ORAL at 22:09

## 2025-02-23 RX ADMIN — GABAPENTIN 300 MILLIGRAM(S): 400 CAPSULE ORAL at 22:13

## 2025-02-23 RX ADMIN — MIRTAZAPINE 15 MILLIGRAM(S): 30 TABLET, FILM COATED ORAL at 22:09

## 2025-02-23 RX ADMIN — ENOXAPARIN SODIUM 40 MILLIGRAM(S): 100 INJECTION SUBCUTANEOUS at 12:36

## 2025-02-23 NOTE — PROGRESS NOTE ADULT - TIME BILLING
Time spent coordinating the patient's care. This includes reviewing documentation pertinent to this admission, results and imaging. Further tests, medications, and procedures have been ordered as indicated. Laboratory results and the plan of care were communicated to the patient and family.  Discussed care plan with nursing and will discuss plan and care with appropriate consultant(s). Supporting documentation was completed and added to the patient's chart including updated MOLST.

## 2025-02-23 NOTE — PROGRESS NOTE ADULT - SUBJECTIVE AND OBJECTIVE BOX
CC: Patient is a 89y old  Male who presents with a chief complaint of     INTERVAL EVENTS: CORONA    SUBJECTIVE / INTERVAL HPI: Patient seen and examined at bedside. patient arousable to physical and verbal stimuli     ROS: negative unless otherwise stated above.    VITAL SIGNS:  Vital Signs Last 24 Hrs  T(C): 36.9 (2025 08:12), Max: 37.4 (2025 16:10)  T(F): 98.5 (2025 08:12), Max: 99.3 (2025 16:10)  HR: 66 (2025 08:12) (66 - 100)  BP: 159/64 (2025 08:12) (126/65 - 159/64)  BP(mean): --  RR: 17 (2025 21:00) (15 - 18)  SpO2: 96% (2025 08:12) (96% - 99%)    Parameters below as of 2025 08:12  Patient On (Oxygen Delivery Method): room air            PHYSICAL EXAM:    General: NAD  HEENT: MMM  Neck: supple  Cardiovascular: +S1/S2; RRR  Respiratory: CTA B/L; no W/R/R  Gastrointestinal: soft, NT/ND  Extremities: WWP; no edema, clubbing or cyanosis  Vascular: 2+ radial, DP/PT pulses B/L  Neurological: patient responds to physical and verbal stimuli does not participate in strength and sensation testing    MEDICATIONS:  MEDICATIONS  (STANDING):  gabapentin Solution 300 milliGRAM(s) Oral at bedtime  losartan 50 milliGRAM(s) Oral daily  mirtazapine 15 milliGRAM(s) Oral at bedtime  nystatin Powder 1 Application(s) Topical every 12 hours  pancrelipase  (CREON 12,000 Lipase Units) 4 Capsule(s) Oral three times a day with meals  QUEtiapine 25 milliGRAM(s) Oral at bedtime  sertraline 25 milliGRAM(s) Oral daily  tamsulosin 0.4 milliGRAM(s) Oral at bedtime    MEDICATIONS  (PRN):  acetaminophen     Tablet .. 650 milliGRAM(s) Oral every 6 hours PRN Temp greater or equal to 38C (100.4F), Mild Pain (1 - 3)  aluminum hydroxide/magnesium hydroxide/simethicone Suspension 30 milliLiter(s) Oral every 4 hours PRN Dyspepsia  melatonin 3 milliGRAM(s) Oral at bedtime PRN Insomnia  ondansetron Injectable 4 milliGRAM(s) IV Push every 8 hours PRN Nausea and/or Vomiting      ALLERGIES:  Allergies    No Known Allergies    Intolerances        LABS:                        12.6   8.24  )-----------( 133      ( 2025 04:39 )             38.5         138  |  109[H]  |  26[H]  ----------------------------<  152[H]  4.1   |  28  |  1.22    Ca    9.2      2025 04:39  Mg     2.3         TPro  7.2  /  Alb  3.6  /  TBili  0.5  /  DBili  x   /  AST  15  /  ALT  16  /  AlkPhos  68        Urinalysis Basic - ( 2025 04:39 )    Color: Yellow / Appearance: Clear / S.019 / pH: x  Gluc: 152 mg/dL / Ketone: Negative mg/dL  / Bili: Negative / Urobili: 0.2 mg/dL   Blood: x / Protein: Negative mg/dL / Nitrite: Negative   Leuk Esterase: Negative / RBC: x / WBC x   Sq Epi: x / Non Sq Epi: x / Bacteria: x      CAPILLARY BLOOD GLUCOSE          RADIOLOGY & ADDITIONAL TESTS: Reviewed.

## 2025-02-23 NOTE — PROGRESS NOTE ADULT - ASSESSMENT
89 yo male with hx of HTN, Hyperlipidemia, Osteoarthritis, Depression, BPH, Dementia presenting via ambulance due to diarrhea admitted for further w/u.    #diarrhea  patient presents with diarrhea   on creon at home for presumed pancreatic insufficiency - seen on imaging in april admission of this year - discussion to not w/u aggressively per family   has been taking creon with no diarrhea   now had an acute bout at home, has not had any while here  possible viral gastroenteritis - ct scan with persistent old pancreatic findings no other new acute findings  responded well to supportive care  -will monitor for diarrhea, may need creon dosing adjusted after infx r/o with gi pcr if persists   -supportive care    #htn  -c/w home losartan     #Anxiety/Depression:  c/w Seroquel , Zoloft , Neurontin qHS    #dvt ppx  lovenox    dispo - pending pt eval, and monitor bms anticipate 24 hours   discussed with patients son

## 2025-02-23 NOTE — PROVIDER CONTACT NOTE (OTHER) - ASSESSMENT
9/6/2023                   Lucila Denton  [address]  [facility] [address]      Dear Lucila,    We are writing to inform you that you are due for your Annual Medicare Wellness Exam.    There are no preventive care reminders to display for this patient.    This appointment will focus on preventative care including a Personalized Prevention Plan, a check of your weight, blood pressure and pulse, a brief check of your hearing and vision, any immunizations you may need, as well as referrals for any other screening services or test you may need.    Medicare will cover the cost of the preventative care services.  Any additional services including lab and some immunizations may not be a covered service.  If you have any other health concerns or questions to discuss with your provider, a medical visit may be added to your wellness visit and you may need to pay a deductible or co-pay depending on your Medicare plan.    Please call our office at [phone] to schedule your appointment with Clarissa Hogue MD .    Please fill out the enclosed questionnaire a few days before your exam and bring it along to the appointment.    We look forward to seeing you soon.    Clarissa Hogue MD  [id number] [facility] [id number] [phone]     asymptomatic

## 2025-02-24 ENCOUNTER — TRANSCRIPTION ENCOUNTER (OUTPATIENT)
Age: 89
End: 2025-02-24

## 2025-02-24 VITALS
SYSTOLIC BLOOD PRESSURE: 156 MMHG | DIASTOLIC BLOOD PRESSURE: 56 MMHG | OXYGEN SATURATION: 100 % | RESPIRATION RATE: 17 BRPM | HEART RATE: 61 BPM | TEMPERATURE: 97 F

## 2025-02-24 LAB
ANION GAP SERPL CALC-SCNC: 3 MMOL/L — LOW (ref 5–17)
BUN SERPL-MCNC: 25 MG/DL — HIGH (ref 7–23)
CALCIUM SERPL-MCNC: 9.1 MG/DL — SIGNIFICANT CHANGE UP (ref 8.5–10.1)
CHLORIDE SERPL-SCNC: 112 MMOL/L — HIGH (ref 96–108)
CO2 SERPL-SCNC: 26 MMOL/L — SIGNIFICANT CHANGE UP (ref 22–31)
CREAT SERPL-MCNC: 1.06 MG/DL — SIGNIFICANT CHANGE UP (ref 0.5–1.3)
EGFR: 65 ML/MIN/1.73M2 — SIGNIFICANT CHANGE UP
GLUCOSE SERPL-MCNC: 89 MG/DL — SIGNIFICANT CHANGE UP (ref 70–99)
HCT VFR BLD CALC: 33.4 % — LOW (ref 39–50)
HGB BLD-MCNC: 11.2 G/DL — LOW (ref 13–17)
MCHC RBC-ENTMCNC: 32.8 PG — SIGNIFICANT CHANGE UP (ref 27–34)
MCHC RBC-ENTMCNC: 33.5 G/DL — SIGNIFICANT CHANGE UP (ref 32–36)
MCV RBC AUTO: 97.9 FL — SIGNIFICANT CHANGE UP (ref 80–100)
NRBC # BLD AUTO: 0 K/UL — SIGNIFICANT CHANGE UP (ref 0–0)
NRBC # FLD: 0 K/UL — SIGNIFICANT CHANGE UP (ref 0–0)
NRBC BLD AUTO-RTO: 0 /100 WBCS — SIGNIFICANT CHANGE UP (ref 0–0)
PLATELET # BLD AUTO: 125 K/UL — LOW (ref 150–400)
PMV BLD: 12 FL — SIGNIFICANT CHANGE UP (ref 7–13)
POTASSIUM SERPL-MCNC: 3.9 MMOL/L — SIGNIFICANT CHANGE UP (ref 3.5–5.3)
POTASSIUM SERPL-SCNC: 3.9 MMOL/L — SIGNIFICANT CHANGE UP (ref 3.5–5.3)
RBC # BLD: 3.41 M/UL — LOW (ref 4.2–5.8)
RBC # FLD: 12.2 % — SIGNIFICANT CHANGE UP (ref 10.3–14.5)
SODIUM SERPL-SCNC: 141 MMOL/L — SIGNIFICANT CHANGE UP (ref 135–145)
WBC # BLD: 4.43 K/UL — SIGNIFICANT CHANGE UP (ref 3.8–10.5)
WBC # FLD AUTO: 4.43 K/UL — SIGNIFICANT CHANGE UP (ref 3.8–10.5)

## 2025-02-24 PROCEDURE — 99239 HOSP IP/OBS DSCHRG MGMT >30: CPT

## 2025-02-24 RX ORDER — LOSARTAN POTASSIUM 100 MG/1
1 TABLET, FILM COATED ORAL
Qty: 30 | Refills: 0
Start: 2025-02-24

## 2025-02-24 RX ORDER — LOSARTAN POTASSIUM 100 MG/1
1 TABLET, FILM COATED ORAL
Refills: 0 | DISCHARGE

## 2025-02-24 RX ADMIN — ENOXAPARIN SODIUM 40 MILLIGRAM(S): 100 INJECTION SUBCUTANEOUS at 12:25

## 2025-02-24 RX ADMIN — SERTRALINE 25 MILLIGRAM(S): 100 TABLET, FILM COATED ORAL at 08:44

## 2025-02-24 RX ADMIN — Medication 4 CAPSULE(S): at 08:28

## 2025-02-24 RX ADMIN — LOSARTAN POTASSIUM 50 MILLIGRAM(S): 100 TABLET, FILM COATED ORAL at 08:44

## 2025-02-24 RX ADMIN — NYSTATIN 1 APPLICATION(S): 100000 CREAM TOPICAL at 11:00

## 2025-02-24 RX ADMIN — Medication 4 CAPSULE(S): at 12:24

## 2025-02-24 NOTE — DISCHARGE NOTE NURSING/CASE MANAGEMENT/SOCIAL WORK - PATIENT PORTAL LINK FT
You can access the FollowMyHealth Patient Portal offered by [facility] by registering at the following website: [email or web address]. By joining [facility]’s FollowMyHealth portal, you will also be able to view your health information using other applications (apps) compatible with our system.

## 2025-02-24 NOTE — DISCHARGE NOTE NURSING/CASE MANAGEMENT/SOCIAL WORK - NSDCPEFALRISK_GEN_ALL_CORE
For information on Fall & Injury Prevention, visit: [email or web address] OR  [email or web address] OR  https://www.cdc.gov/steadi/patient.html

## 2025-02-24 NOTE — DISCHARGE NOTE NURSING/CASE MANAGEMENT/SOCIAL WORK - FINANCIAL ASSISTANCE
[facility] provides services at a reduced cost to those who are determined to be eligible through [facility]’s financial assistance program. Information regarding [facility]’s financial assistance program can be found by going to [email or web address] or by calling [phone].

## 2025-02-24 NOTE — PHYSICAL THERAPY INITIAL EVALUATION ADULT - LIVES WITH, PROFILE
Condo with elevator/children/spouse Scribe Attestation (For Scribes USE Only)... Scribe Attestation (For Scribes USE Only).../Attending Attestation (For Attendings USE Only)...

## 2025-02-24 NOTE — DISCHARGE NOTE PROVIDER - NSDCHHNEEDSERVICE_GEN_ALL_CORE
Samples Given: Cetaphil cream and lotion\\nCerave moisturizer Detail Level: Zone Medication teaching and assessment/Observation and assessment/Rehabilitation services/Teaching and training

## 2025-02-24 NOTE — DISCHARGE NOTE PROVIDER - NSDCFUSCHEDAPPT_GEN_ALL_CORE_FT
Susanne Phillips Physician [facility]  GERIATRICS [address] [place] [address]   Scheduled Appointment: 03/19/2025

## 2025-02-24 NOTE — DISCHARGE NOTE PROVIDER - NSDCCPCAREPLAN_GEN_ALL_CORE_FT
PRINCIPAL DISCHARGE DIAGNOSIS  Diagnosis: Diarrheal disease  Assessment and Plan of Treatment: Gastroenteritis

## 2025-02-24 NOTE — DISCHARGE NOTE PROVIDER - ATTENDING ATTESTATION STATEMENT
-Continue current regimen as ordered.  Pain management signed off  [phone] I have personally seen and examined the patient. I have collaborated with and supervised the

## 2025-02-24 NOTE — DISCHARGE NOTE PROVIDER - HOSPITAL COURSE
· 89 yo male with hx of HTN, Hyperlipidemia, Osteoarthritis, Depression, BPH, Dementia presenting via ambulance due to diarrhea. Patient is a poor historian.  States he doesn't feel well and has a sore throat.  Denies congestion or coughing.  Denies abdominal pain.  In the ED he had loose stool and he is very weak and unable to go home alone.    2.24: diarrhea has resolved, mentation at baseline, confused    Vital Signs Last 24 Hrs  T(C): 36.3 (24 Feb 2025 07:54), Max: 36.7 (23 Feb 2025 15:30)  T(F): 97.4 (24 Feb 2025 07:54), Max: 98.1 (23 Feb 2025 15:30)  HR: 69 (24 Feb 2025 07:54) (69 - 82)  BP: 166/78 (24 Feb 2025 07:54) (134/60 - 187/88)  BP(mean): --  RR: 18 (23 Feb 2025 22:32) (18 - 18)  SpO2: 97% (24 Feb 2025 07:54) (94% - 100%)    Parameters below as of 24 Feb 2025 07:54  Patient On (Oxygen Delivery Method): room air    PHYSICAL EXAM:    GENERAL: NAD  HEENT:  NC/AT, EOMI, PERRLA, No scleral icterus, Moist mucous membranes  NECK: Supple, No JVD  CNS:  Alert & Oriented X3, Motor Strength 5/5 B/L upper and lower extremities; DTRs 2+ intact   LUNG: Normal Breath sounds, Clear to auscultation bilaterally, No rales, No rhonchi, No wheezing  HEART: RRR; No murmurs, No rubs  ABDOMEN: +BS, ST/ND/NT  GENITOURINARY- Voiding, Bladder not distended  EXTREMITIES:  2+ Peripheral Pulses, No clubbing, No cyanosis, No tibial edema  MUSCULOSKELTAL: Joints normal ROM, No joint tenderness, No muscle tenderness  RECTAL: deferred, not indicated  BREAST: deferred    a/p:    #Diarrhea  patient presents with diarrhea   on creon at home for presumed pancreatic insufficiency - seen on imaging in april admission of this year - discussion to not workup  aggressively per family   has been taking creon with no diarrhea   possible viral gastroenteritis - ct scan with persistent old pancreatic findings no other new acute findings  responded well to supportive care  c/w Creon at home     #htn  -c/w home losartan     #Anxiety/Depression:  c/w Seroquel , Zoloft , Neurontin qHS

## 2025-02-24 NOTE — PHYSICAL THERAPY INITIAL EVALUATION ADULT - MODALITIES TREATMENT COMMENTS
pt left in bed supine post Eval; bed alarm on; callbell in reach; pt instructed not to get up alone; call nursing for assist; meryl well; denied pain

## 2025-02-26 ENCOUNTER — NON-APPOINTMENT (OUTPATIENT)
Age: 89
End: 2025-02-26

## 2025-02-28 DIAGNOSIS — Z96.641 PRESENCE OF RIGHT ARTIFICIAL HIP JOINT: ICD-10-CM

## 2025-02-28 DIAGNOSIS — I10 ESSENTIAL (PRIMARY) HYPERTENSION: ICD-10-CM

## 2025-02-28 DIAGNOSIS — F32.A DEPRESSION, UNSPECIFIED: ICD-10-CM

## 2025-02-28 DIAGNOSIS — E78.5 HYPERLIPIDEMIA, UNSPECIFIED: ICD-10-CM

## 2025-02-28 DIAGNOSIS — N40.0 BENIGN PROSTATIC HYPERPLASIA WITHOUT LOWER URINARY TRACT SYMPTOMS: ICD-10-CM

## 2025-02-28 DIAGNOSIS — A08.4 VIRAL INTESTINAL INFECTION, UNSPECIFIED: ICD-10-CM

## 2025-02-28 DIAGNOSIS — K86.89 OTHER SPECIFIED DISEASES OF PANCREAS: ICD-10-CM

## 2025-03-08 ENCOUNTER — RX RENEWAL (OUTPATIENT)
Age: 89
End: 2025-03-08

## 2025-03-17 ENCOUNTER — RX RENEWAL (OUTPATIENT)
Age: 89
End: 2025-03-17

## 2025-03-19 ENCOUNTER — APPOINTMENT (OUTPATIENT)
Dept: GERIATRICS | Facility: CLINIC | Age: 89
End: 2025-03-19
Payer: MEDICARE

## 2025-03-19 DIAGNOSIS — G47.00 INSOMNIA, UNSPECIFIED: ICD-10-CM

## 2025-03-19 DIAGNOSIS — F03.C0 UNSPECIFIED DEMENTIA, SEVERE, WITHOUT BEHAVIORAL DISTURBANCE, PSYCHOTIC DISTURBANCE, MOOD DISTURBANCE, AND ANXIETY: ICD-10-CM

## 2025-03-19 DIAGNOSIS — R53.81 OTHER MALAISE: ICD-10-CM

## 2025-03-19 DIAGNOSIS — K86.1 OTHER CHRONIC PANCREATITIS: ICD-10-CM

## 2025-03-19 DIAGNOSIS — N40.0 BENIGN PROSTATIC HYPERPLASIA WITHOUT LOWER URINARY TRACT SYMPMS: ICD-10-CM

## 2025-03-19 PROCEDURE — G2211 COMPLEX E/M VISIT ADD ON: CPT | Mod: 2W

## 2025-03-19 PROCEDURE — 99214 OFFICE O/P EST MOD 30 MIN: CPT | Mod: 2W

## 2025-04-10 NOTE — ED PROVIDER NOTE - HEME LYMPH, MLM
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  Pt had a meniscus surgery 1.5 years ago and a L TKA 25.   Treatment Diagnosis:   M62.81 muscle weakness, R26.89 abnormality of gait   Insurance/Certification information:  [employer]  Referring Physician:  Emil Kaufman MD     PCP: Sreekanth Jaime MD  Plan of care signed (Y/N):  eval faxed  Outcome Measure: LEFs:  = 15% ability = 85% disability   Visit# / total visits:     Pain level: 6/10   Goals:     Patient goals:  Pt reports he wants to be able to go do what he needs to do and return to work.     Short term goals to be achieved by May 16, 2025:  Short term goal 1: Pt will report compliance with current HEP as prescribed in order to improve ROM and strength.   Short term goal 2: Pt will demonstrate AAROM L knee flexion to 120 degrees in order to improve ROM.   Short term goal 3: Pt will demonstrate a LEFs score of no more than 70% disability in order to improve quality of life.   Short term goal 4: Pt will demonstrate the ability to safely complete at least 2 complete laps in the 2 minute walk test with heel toe gait with a SPC in order to improve functional mobility.   Short term goal 5: Pt will demonstrate the ability to safely ambulate up the steps with a reciprocal gait and 1 HHA in 2/2 trials in order to improve functional mobility    Subjective:  See eval     Any changes in Ambulatory Summary Sheet?  None    Objective:  AROM  LE  Knee:     Left   Knee flexion     AAROM:  94  degrees   Knee extension       0 degrees          Exercises: (No more than 4 columns) 
No adenopathy or splenomegaly. No cervical or inguinal lymphadenopathy.

## 2025-04-14 ENCOUNTER — RX RENEWAL (OUTPATIENT)
Age: 89
End: 2025-04-14

## 2025-07-15 NOTE — PHYSICAL THERAPY INITIAL EVALUATION ADULT - REFERRING PHYSICIAN, REHAB EVAL
[email or web address] Reece [email or web address]

## 2025-08-01 ENCOUNTER — RX RENEWAL (OUTPATIENT)
Age: 89
End: 2025-08-01

## 2025-08-04 ENCOUNTER — RX RENEWAL (OUTPATIENT)
Age: 89
End: 2025-08-04

## 2025-08-20 ENCOUNTER — RX RENEWAL (OUTPATIENT)
Age: 89
End: 2025-08-20

## 2025-09-15 ENCOUNTER — RX RENEWAL (OUTPATIENT)
Age: 89
End: 2025-09-15